# Patient Record
Sex: FEMALE | Race: WHITE | NOT HISPANIC OR LATINO | Employment: UNEMPLOYED | URBAN - METROPOLITAN AREA
[De-identification: names, ages, dates, MRNs, and addresses within clinical notes are randomized per-mention and may not be internally consistent; named-entity substitution may affect disease eponyms.]

---

## 2017-05-02 ENCOUNTER — HISTORICAL (OUTPATIENT)
Dept: ENDOSCOPY | Facility: HOSPITAL | Age: 62
End: 2017-05-02

## 2018-10-05 ENCOUNTER — HISTORICAL (OUTPATIENT)
Dept: INTERNAL MEDICINE | Facility: CLINIC | Age: 63
End: 2018-10-05

## 2018-10-05 LAB
ABS NEUT (OLG): 4.16 X10(3)/MCL (ref 2.1–9.2)
ALBUMIN SERPL-MCNC: 3.9 GM/DL (ref 3.4–5)
ALBUMIN/GLOB SERPL: 1 RATIO (ref 1–2)
ALP SERPL-CCNC: 103 UNIT/L (ref 45–117)
ALT SERPL-CCNC: 29 UNIT/L (ref 12–78)
AST SERPL-CCNC: 22 UNIT/L (ref 15–37)
BASOPHILS # BLD AUTO: 0.03 X10(3)/MCL
BASOPHILS NFR BLD AUTO: 0 %
BILIRUB SERPL-MCNC: 0.5 MG/DL (ref 0.2–1)
BILIRUBIN DIRECT+TOT PNL SERPL-MCNC: 0.1 MG/DL
BILIRUBIN DIRECT+TOT PNL SERPL-MCNC: 0.4 MG/DL
BUN SERPL-MCNC: 11 MG/DL (ref 7–18)
CALCIUM SERPL-MCNC: 9 MG/DL (ref 8.5–10.1)
CHLORIDE SERPL-SCNC: 104 MMOL/L (ref 98–107)
CHOLEST SERPL-MCNC: 208 MG/DL
CHOLEST/HDLC SERPL: 2.6 {RATIO} (ref 0–4.4)
CO2 SERPL-SCNC: 30 MMOL/L (ref 21–32)
CREAT SERPL-MCNC: 0.8 MG/DL (ref 0.6–1.3)
EOSINOPHIL # BLD AUTO: 0.19 X10(3)/MCL
EOSINOPHIL NFR BLD AUTO: 3 %
ERYTHROCYTE [DISTWIDTH] IN BLOOD BY AUTOMATED COUNT: 14.3 % (ref 11.5–14.5)
EST. AVERAGE GLUCOSE BLD GHB EST-MCNC: 108 MG/DL
GLOBULIN SER-MCNC: 4.7 GM/ML (ref 2.3–3.5)
GLUCOSE SERPL-MCNC: 106 MG/DL (ref 74–106)
HBA1C MFR BLD: 5.4 % (ref 4.2–6.3)
HCT VFR BLD AUTO: 43.8 % (ref 35–46)
HDLC SERPL-MCNC: 79 MG/DL
HGB BLD-MCNC: 13.9 GM/DL (ref 12–16)
IMM GRANULOCYTES # BLD AUTO: 0.02 10*3/UL
IMM GRANULOCYTES NFR BLD AUTO: 0 %
LDLC SERPL CALC-MCNC: 88 MG/DL (ref 0–130)
LYMPHOCYTES # BLD AUTO: 2.15 X10(3)/MCL
LYMPHOCYTES NFR BLD AUTO: 31 % (ref 13–40)
MCH RBC QN AUTO: 29.1 PG (ref 26–34)
MCHC RBC AUTO-ENTMCNC: 31.7 GM/DL (ref 31–37)
MCV RBC AUTO: 91.8 FL (ref 80–100)
MONOCYTES # BLD AUTO: 0.38 X10(3)/MCL
MONOCYTES NFR BLD AUTO: 6 % (ref 4–12)
NEUTROPHILS # BLD AUTO: 4.16 X10(3)/MCL
NEUTROPHILS NFR BLD AUTO: 60 X10(3)/MCL
PLATELET # BLD AUTO: 257 X10(3)/MCL (ref 130–400)
PMV BLD AUTO: 10.4 FL (ref 7.4–10.4)
POTASSIUM SERPL-SCNC: 4.1 MMOL/L (ref 3.5–5.1)
PROT SERPL-MCNC: 8.6 GM/DL (ref 6.4–8.2)
RBC # BLD AUTO: 4.77 X10(6)/MCL (ref 4–5.2)
SODIUM SERPL-SCNC: 142 MMOL/L (ref 136–145)
T4 FREE SERPL-MCNC: 0.69 NG/DL (ref 0.76–1.46)
TRIGL SERPL-MCNC: 205 MG/DL
TSH SERPL-ACNC: 3.85 MIU/L (ref 0.36–3.74)
VLDLC SERPL CALC-MCNC: 41 MG/DL
WBC # SPEC AUTO: 6.9 X10(3)/MCL (ref 4.5–11)

## 2018-10-29 ENCOUNTER — HISTORICAL (OUTPATIENT)
Dept: INTERNAL MEDICINE | Facility: CLINIC | Age: 63
End: 2018-10-29

## 2019-06-17 ENCOUNTER — HISTORICAL (OUTPATIENT)
Dept: RADIOLOGY | Facility: HOSPITAL | Age: 64
End: 2019-06-17

## 2019-07-03 ENCOUNTER — HISTORICAL (OUTPATIENT)
Dept: RADIOLOGY | Facility: HOSPITAL | Age: 64
End: 2019-07-03

## 2019-07-03 LAB
CHOLEST SERPL-MCNC: 185 MG/DL
CHOLEST/HDLC SERPL: 2.4 {RATIO} (ref 0–4.4)
HDLC SERPL-MCNC: 76 MG/DL
LDLC SERPL CALC-MCNC: 78 MG/DL (ref 0–130)
TRIGL SERPL-MCNC: 155 MG/DL
VLDLC SERPL CALC-MCNC: 31 MG/DL

## 2019-09-19 ENCOUNTER — HISTORICAL (OUTPATIENT)
Dept: RADIOLOGY | Facility: HOSPITAL | Age: 64
End: 2019-09-19

## 2020-01-13 ENCOUNTER — HISTORICAL (OUTPATIENT)
Dept: ENDOSCOPY | Facility: HOSPITAL | Age: 65
End: 2020-01-13

## 2020-01-13 LAB — CRC RECOMMENDATION EXT: NORMAL

## 2020-02-24 ENCOUNTER — HISTORICAL (OUTPATIENT)
Dept: RESPIRATORY THERAPY | Facility: HOSPITAL | Age: 65
End: 2020-02-24

## 2020-03-11 ENCOUNTER — HISTORICAL (OUTPATIENT)
Dept: INTERNAL MEDICINE | Facility: CLINIC | Age: 65
End: 2020-03-11

## 2020-03-11 LAB
ABS NEUT (OLG): 3.4 X10(3)/MCL (ref 2.1–9.2)
ALBUMIN SERPL-MCNC: 3.7 GM/DL (ref 3.4–5)
ALBUMIN/GLOB SERPL: 0.9 RATIO (ref 1.1–2)
ALP SERPL-CCNC: 104 UNIT/L (ref 45–117)
ALT SERPL-CCNC: 23 UNIT/L (ref 12–78)
AST SERPL-CCNC: 18 UNIT/L (ref 15–37)
BASOPHILS # BLD AUTO: 0 X10(3)/MCL (ref 0–0.2)
BASOPHILS NFR BLD AUTO: 0 %
BILIRUB SERPL-MCNC: 0.4 MG/DL (ref 0.2–1)
BILIRUBIN DIRECT+TOT PNL SERPL-MCNC: 0.1 MG/DL (ref 0–0.2)
BILIRUBIN DIRECT+TOT PNL SERPL-MCNC: 0.3 MG/DL
BUN SERPL-MCNC: 14 MG/DL (ref 7–18)
CALCIUM SERPL-MCNC: 9.1 MG/DL (ref 8.5–10.1)
CHLORIDE SERPL-SCNC: 106 MMOL/L (ref 98–107)
CHOLEST SERPL-MCNC: 190 MG/DL
CHOLEST/HDLC SERPL: 2.3 {RATIO} (ref 0–4.4)
CO2 SERPL-SCNC: 31 MMOL/L (ref 21–32)
CREAT SERPL-MCNC: 0.8 MG/DL (ref 0.6–1.3)
EOSINOPHIL # BLD AUTO: 0.2 X10(3)/MCL (ref 0–0.9)
EOSINOPHIL NFR BLD AUTO: 2 %
ERYTHROCYTE [DISTWIDTH] IN BLOOD BY AUTOMATED COUNT: 14.7 % (ref 11.5–14.5)
EST. AVERAGE GLUCOSE BLD GHB EST-MCNC: 117 MG/DL
GLOBULIN SER-MCNC: 4.3 GM/ML (ref 2.3–3.5)
GLUCOSE SERPL-MCNC: 104 MG/DL (ref 74–106)
HBA1C MFR BLD: 5.7 % (ref 4.2–6.3)
HCT VFR BLD AUTO: 42.5 % (ref 35–46)
HDLC SERPL-MCNC: 81 MG/DL (ref 40–59)
HGB BLD-MCNC: 12.9 GM/DL (ref 12–16)
IMM GRANULOCYTES # BLD AUTO: 0.02 10*3/UL
IMM GRANULOCYTES NFR BLD AUTO: 0 %
LDLC SERPL CALC-MCNC: 84 MG/DL
LYMPHOCYTES # BLD AUTO: 2 X10(3)/MCL (ref 0.6–4.6)
LYMPHOCYTES NFR BLD AUTO: 33 %
MCH RBC QN AUTO: 28.2 PG (ref 26–34)
MCHC RBC AUTO-ENTMCNC: 30.4 GM/DL (ref 31–37)
MCV RBC AUTO: 92.8 FL (ref 80–100)
MONOCYTES # BLD AUTO: 0.4 X10(3)/MCL (ref 0.1–1.3)
MONOCYTES NFR BLD AUTO: 7 %
NEUTROPHILS # BLD AUTO: 3.4 X10(3)/MCL (ref 2.1–9.2)
NEUTROPHILS NFR BLD AUTO: 57 %
PLATELET # BLD AUTO: 235 X10(3)/MCL (ref 130–400)
PMV BLD AUTO: 10.3 FL (ref 7.4–10.4)
POTASSIUM SERPL-SCNC: 4.6 MMOL/L (ref 3.5–5.1)
PROT SERPL-MCNC: 8 GM/DL (ref 6.4–8.2)
RBC # BLD AUTO: 4.58 X10(6)/MCL (ref 4–5.2)
SODIUM SERPL-SCNC: 141 MMOL/L (ref 136–145)
T3FREE SERPL-MCNC: 2.29 PG/ML (ref 2.18–3.98)
T4 FREE SERPL-MCNC: 0.71 NG/DL (ref 0.76–1.46)
TRIGL SERPL-MCNC: 127 MG/DL
TSH SERPL-ACNC: 3.78 MIU/L (ref 0.36–3.74)
VLDLC SERPL CALC-MCNC: 25 MG/DL
WBC # SPEC AUTO: 5.9 X10(3)/MCL (ref 4.5–11)

## 2021-04-30 ENCOUNTER — HISTORICAL (OUTPATIENT)
Dept: RADIOLOGY | Facility: HOSPITAL | Age: 66
End: 2021-04-30

## 2021-04-30 LAB
ABS NEUT (OLG): 3.35 X10(3)/MCL (ref 2.1–9.2)
ALBUMIN SERPL-MCNC: 3.9 GM/DL (ref 3.4–4.8)
ALBUMIN/GLOB SERPL: 1 RATIO (ref 1.1–2)
ALP SERPL-CCNC: 110 UNIT/L (ref 40–150)
ALT SERPL-CCNC: 17 UNIT/L (ref 0–55)
AST SERPL-CCNC: 21 UNIT/L (ref 5–34)
BASOPHILS # BLD AUTO: 0 X10(3)/MCL (ref 0–0.2)
BASOPHILS NFR BLD AUTO: 0 %
BILIRUB SERPL-MCNC: 0.6 MG/DL
BILIRUBIN DIRECT+TOT PNL SERPL-MCNC: 0.3 MG/DL (ref 0–0.5)
BILIRUBIN DIRECT+TOT PNL SERPL-MCNC: 0.3 MG/DL (ref 0–0.8)
BUN SERPL-MCNC: 13.8 MG/DL (ref 9.8–20.1)
CALCIUM SERPL-MCNC: 9.7 MG/DL (ref 8.4–10.2)
CHLORIDE SERPL-SCNC: 105 MMOL/L (ref 98–107)
CHOLEST SERPL-MCNC: 188 MG/DL
CHOLEST/HDLC SERPL: 3 {RATIO} (ref 0–5)
CO2 SERPL-SCNC: 26 MMOL/L (ref 23–31)
CREAT SERPL-MCNC: 0.75 MG/DL (ref 0.55–1.02)
EOSINOPHIL # BLD AUTO: 0.3 X10(3)/MCL (ref 0–0.9)
EOSINOPHIL NFR BLD AUTO: 4 %
ERYTHROCYTE [DISTWIDTH] IN BLOOD BY AUTOMATED COUNT: 14.4 % (ref 11.5–14.5)
EST. AVERAGE GLUCOSE BLD GHB EST-MCNC: 114 MG/DL
GLOBULIN SER-MCNC: 4 GM/DL (ref 2.4–3.5)
GLUCOSE SERPL-MCNC: 111 MG/DL (ref 82–115)
HBA1C MFR BLD: 5.6 %
HCT VFR BLD AUTO: 43.4 % (ref 35–46)
HDLC SERPL-MCNC: 66 MG/DL (ref 35–60)
HGB BLD-MCNC: 13.3 GM/DL (ref 12–16)
IMM GRANULOCYTES # BLD AUTO: 0.02 10*3/UL
IMM GRANULOCYTES NFR BLD AUTO: 0 %
LDLC SERPL CALC-MCNC: 93 MG/DL (ref 50–140)
LYMPHOCYTES # BLD AUTO: 2 X10(3)/MCL (ref 0.6–4.6)
LYMPHOCYTES NFR BLD AUTO: 32 %
MCH RBC QN AUTO: 28.4 PG (ref 26–34)
MCHC RBC AUTO-ENTMCNC: 30.6 GM/DL (ref 31–37)
MCV RBC AUTO: 92.5 FL (ref 80–100)
MONOCYTES # BLD AUTO: 0.4 X10(3)/MCL (ref 0.1–1.3)
MONOCYTES NFR BLD AUTO: 7 %
NEUTROPHILS # BLD AUTO: 3.35 X10(3)/MCL (ref 2.1–9.2)
NEUTROPHILS NFR BLD AUTO: 55 %
PLATELET # BLD AUTO: 183 X10(3)/MCL (ref 130–400)
PMV BLD AUTO: 10.2 FL (ref 7.4–10.4)
POTASSIUM SERPL-SCNC: 4.7 MMOL/L (ref 3.5–5.1)
PROT SERPL-MCNC: 7.9 GM/DL (ref 5.8–7.6)
RBC # BLD AUTO: 4.69 X10(6)/MCL (ref 4–5.2)
SODIUM SERPL-SCNC: 142 MMOL/L (ref 136–145)
T4 FREE SERPL-MCNC: 0.76 NG/DL (ref 0.7–1.48)
TRIGL SERPL-MCNC: 145 MG/DL (ref 37–140)
TSH SERPL-ACNC: 3.01 UIU/ML (ref 0.35–4.94)
VLDLC SERPL CALC-MCNC: 29 MG/DL
WBC # SPEC AUTO: 6 X10(3)/MCL (ref 4.5–11)

## 2021-05-07 ENCOUNTER — HISTORICAL (OUTPATIENT)
Dept: RADIOLOGY | Facility: HOSPITAL | Age: 66
End: 2021-05-07

## 2021-05-21 ENCOUNTER — HISTORICAL (OUTPATIENT)
Dept: ADMINISTRATIVE | Facility: HOSPITAL | Age: 66
End: 2021-05-21

## 2021-11-04 ENCOUNTER — HISTORICAL (OUTPATIENT)
Dept: RADIOLOGY | Facility: HOSPITAL | Age: 66
End: 2021-11-04

## 2021-11-04 LAB
ABS NEUT (OLG): 3.36 X10(3)/MCL (ref 2.1–9.2)
ALBUMIN SERPL-MCNC: 3.8 GM/DL (ref 3.4–4.8)
ALBUMIN/GLOB SERPL: 1 RATIO (ref 1.1–2)
ALP SERPL-CCNC: 92 UNIT/L (ref 40–150)
ALT SERPL-CCNC: 18 UNIT/L (ref 0–55)
AST SERPL-CCNC: 19 UNIT/L (ref 5–34)
BASOPHILS # BLD AUTO: 0 X10(3)/MCL (ref 0–0.2)
BASOPHILS NFR BLD AUTO: 0 %
BILIRUB SERPL-MCNC: 0.5 MG/DL
BILIRUBIN DIRECT+TOT PNL SERPL-MCNC: 0.2 MG/DL (ref 0–0.5)
BILIRUBIN DIRECT+TOT PNL SERPL-MCNC: 0.3 MG/DL (ref 0–0.8)
BUN SERPL-MCNC: 12.9 MG/DL (ref 9.8–20.1)
CALCIUM SERPL-MCNC: 9.6 MG/DL (ref 8.7–10.5)
CHLORIDE SERPL-SCNC: 107 MMOL/L (ref 98–107)
CHOLEST SERPL-MCNC: 177 MG/DL
CHOLEST/HDLC SERPL: 3 {RATIO} (ref 0–5)
CO2 SERPL-SCNC: 28 MMOL/L (ref 23–31)
CREAT SERPL-MCNC: 0.73 MG/DL (ref 0.55–1.02)
EOSINOPHIL # BLD AUTO: 0.1 X10(3)/MCL (ref 0–0.9)
EOSINOPHIL NFR BLD AUTO: 2 %
ERYTHROCYTE [DISTWIDTH] IN BLOOD BY AUTOMATED COUNT: 14.5 % (ref 11.5–14.5)
EST. AVERAGE GLUCOSE BLD GHB EST-MCNC: 102.5 MG/DL
GLOBULIN SER-MCNC: 3.9 GM/DL (ref 2.4–3.5)
GLUCOSE SERPL-MCNC: 111 MG/DL (ref 82–115)
HBA1C MFR BLD: 5.2 %
HCT VFR BLD AUTO: 39.8 % (ref 35–46)
HDLC SERPL-MCNC: 57 MG/DL (ref 35–60)
HGB BLD-MCNC: 12.6 GM/DL (ref 12–16)
IMM GRANULOCYTES # BLD AUTO: 0.02 10*3/UL
IMM GRANULOCYTES NFR BLD AUTO: 0 %
LDLC SERPL CALC-MCNC: 93 MG/DL (ref 50–140)
LYMPHOCYTES # BLD AUTO: 1.7 X10(3)/MCL (ref 0.6–4.6)
LYMPHOCYTES NFR BLD AUTO: 31 %
MCH RBC QN AUTO: 28.6 PG (ref 26–34)
MCHC RBC AUTO-ENTMCNC: 31.7 GM/DL (ref 31–37)
MCV RBC AUTO: 90.5 FL (ref 80–100)
MONOCYTES # BLD AUTO: 0.3 X10(3)/MCL (ref 0.1–1.3)
MONOCYTES NFR BLD AUTO: 6 %
NEUTROPHILS # BLD AUTO: 3.36 X10(3)/MCL (ref 2.1–9.2)
NEUTROPHILS NFR BLD AUTO: 60 %
NRBC BLD AUTO-RTO: 0 % (ref 0–0.2)
PLATELET # BLD AUTO: 237 X10(3)/MCL (ref 130–400)
PMV BLD AUTO: 9.9 FL (ref 7.4–10.4)
POTASSIUM SERPL-SCNC: 4.5 MMOL/L (ref 3.5–5.1)
PROT SERPL-MCNC: 7.7 GM/DL (ref 5.8–7.6)
RBC # BLD AUTO: 4.4 X10(6)/MCL (ref 4–5.2)
SODIUM SERPL-SCNC: 143 MMOL/L (ref 136–145)
T4 FREE SERPL-MCNC: 0.75 NG/DL (ref 0.7–1.48)
TRIGL SERPL-MCNC: 136 MG/DL (ref 37–140)
TSH SERPL-ACNC: 2.49 UIU/ML (ref 0.35–4.94)
VLDLC SERPL CALC-MCNC: 27 MG/DL
WBC # SPEC AUTO: 5.6 X10(3)/MCL (ref 4.5–11)

## 2022-04-07 ENCOUNTER — HISTORICAL (OUTPATIENT)
Dept: ADMINISTRATIVE | Facility: HOSPITAL | Age: 67
End: 2022-04-07

## 2022-04-11 ENCOUNTER — HISTORICAL (OUTPATIENT)
Dept: ADMINISTRATIVE | Facility: HOSPITAL | Age: 67
End: 2022-04-11
Payer: MEDICARE

## 2022-04-29 VITALS
OXYGEN SATURATION: 96 % | SYSTOLIC BLOOD PRESSURE: 115 MMHG | HEIGHT: 61 IN | WEIGHT: 293 LBS | DIASTOLIC BLOOD PRESSURE: 71 MMHG | BODY MASS INDEX: 55.32 KG/M2

## 2022-04-30 NOTE — OP NOTE
DATE OF SURGERY:    05/02/2017    SURGEON:  Bryon Anne MD    attending physician:  Dr. Bryon Anne MD    PROCEDURE:  Colonoscopy to the cecum.    PREOPERATIVE DIAGNOSIS:  Rectal bleeding.    POSTOPERATIVE DIAGNOSES:    1. Rectal bleeding.    2. Diverticulosis.    FINDINGS:  Scattered diverticula throughout the colon with a fair prep on insertion.  Lavaged to a good prep on withdrawal.    SPECIMEN:  Zero.    BLOOD LOSS:  Zero.    DESCRIPTION OF PROCEDURE:  After informed consent was obtained, the patient was sedated by the Anesthesia department with propofol, after which time the Olympus videocolonoscope was inserted into the rectum, advanced up the rectosigmoid colon, around the left colon, and down to the cecum, identifying the base of the cecum and the ileocecal valve.  The prep throughout the colon on insertion was fair with no abnormalities noted.  There were scattered diverticula throughout the colon.  Upon reaching the cecum, the scope was slowly withdrawn back down to the rectum, lavaging the colon to a good prep and finding no additional abnormalities aside from the scattered diverticula.  The scope was then retroflexed in the rectal vault, finding moderate hemorrhoidal congestion.  The scope was then withdrawn and the patient then sent to the holding area.      ______________________________  MD MARJORIE Shah/MODL  DD:  05/02/2017  Time:  08:59AM  DT:  05/02/2017  Time:  09:19AM  Job #:  [id number]

## 2022-05-05 NOTE — HISTORICAL [facility]ER
This is a historical note converted from Cerelena. Formatting and pictures may have been removed.  Please reference Cerelena for original formatting and attached multimedia. History of Present Illness  64F with obesity HLD anxiety presenting for screening colonoscopy. Patient was FIT positive.? She has a hx of hemorrhoids dxed on colonoscopy in 2017. She reports symptomatically having BRBPR when having bowel movements. She has noticed blood in the bowl of the toilet. She denies any dark or tarry stools. She denies any nausea or vomiting. Only other symptom she reports is joint pain for which she takes aleve, and she correlated the beginning of the bleeding temporally with the taking of aleve. Family history is significant for an aunt who had CRC diagnosed in her 80s. Surgical history significant for appendectomy and cholecystectomy  Review of Systems  Negative unless otherwise stated  Physical Exam  Gen: NAD, obese  CV: peripheral pulses intact  Pulm: normal effort no accessory muscle use  Abd: obese, soft nt nd  MSK: BE, no edema  Assessment/Plan  A: 64 F with?hemorrhoids presenting for screening colonoscopy, FIT positive, symptomatic rectal bleeding  ?  P:  Colonoscopy today   Problem List/Past Medical History  Ongoing  Anxiety  anxiety  arthritis  high cholesterol  low back pain with numbness to legs  Morbid obesity  SOB (shortness of breath)  Historical  No qualifying data  Procedure/Surgical History  Colonoscopy (05/02/2017)  Colonoscopy, flexible; diagnostic, including collection of specimen(s) by brushing or washing, when performed (separate procedure) (05/02/2017)  Inspection of Lower Intestinal Tract, Via Natural or Artificial Opening Endoscopic (05/02/2017)  Esophagogastroduodenoscopy, flexible, transoral; diagnostic, including collection of specimen(s) by brushing or washing, when performed (separate procedure) (06/10/2016)  Inspection of Upper Intestinal Tract, Via Natural or Artificial Opening Endoscopic  (06/10/2016)  Exam Under Anesthesia Rectal (None) (03/23/2016)  Excision of Hemorrhoidal Plexus, Open Approach (03/23/2016)  Hemorrhoidectomy (None) (03/23/2016)  Hemorrhoidectomy, internal and external, single column/group; (03/23/2016)  Inspection of Lower Intestinal Tract, Via Natural or Artificial Opening Endoscopic (03/23/2016)  Proctoscopy (None) (03/23/2016)  Proctosigmoidoscopy, rigid; diagnostic, with or without collection of specimen(s) by brushing or washing (separate procedure) (03/23/2016)  Contrast myelogram (12/20/2012)  Injection procedure for myelography and/or computed tomography, spinal (other than C1-C2 and posterior fossa). (12/20/2012)  Arthroplasty, glenohumeral joint; total shoulder (glenoid and proximal humeral replacement (eg, total shoulder)) (2012)  r shoulder replacement  Appendectomy  Cholecystectomy   Medications  Inpatient  Emla topical cream, 1 don, TOP, Once  Lidocaine Viscous 2% mucous membrane solution, 15 mL/EA, N/A, Once  Valium, 10 mg= 2 tab(s), Oral, Once  Home  Crestor 10 mg oral tablet, See Instructions  gabapentin 600 mg oral tablet, 600 mg= 1 tab(s), Oral, TID, 11 refills  Multiple Vitamins oral tablet, 1 tab(s), Oral, Daily  NexIUM 40 mg oral delayed release capsule, 40 mg= 1 cap(s), Oral, Daily, 3 refills  Ventolin HFA 90 mcg/inh inhalation aerosol, 2 puff(s), INH, q6hr, PRN, 11 refills  Zoloft 100 mg oral tablet, 200 mg= 2 tab(s), Oral, Daily, 3 refills  Allergies  NSAIDs?(stomach)  Social History  Abuse/Neglect  No, No, Yes, 09/17/2019  Alcohol - Denies Alcohol Use, 03/06/2013  Past, 09/17/2018  Past, Stopped age 29 Years., 11/16/2015  Past, Started age 16 Years. Stopped age 29 Years. Previous treatment: None. Alcohol use interferes with work or home: No. Drinks more than intended: No. Others hurt by drinking: No. Ready to change: No. 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  Operates hazardous equipment: No., 12/02/2016  HOUSE WIFE, Work/School description: homemaker. Operates hazardous equipment: No., 12/02/2016  homemaker, Work/School description: homemaker. Operates hazardous equipment: No., 12/02/2016  Work/School description: homemaker. Operates hazardous equipment: No. Other: 9th grade., 12/02/2016  Exercise - Does not exercise, 05/13/2015  Self assessment: Poor condition., 05/16/2016  Exercise frequency: 1-2 times/week. Self assessment: Fair condition. Exercise type: Walking., 11/16/2015  Home/Environment  Lives with Spouse. Living situation: Home/Independent. Alcohol abuse in household: No. Substance abuse in household: No. Smoker in household: No. Injuries/Abuse/Neglect in household: No. Feels unsafe at home: No. Safe place to go: Yes. Agency(s)/Others notified: No. Family/Friends available for support: Yes. Concern for family members at home: No. Major illness in household: No. Financial concerns: No. TV/Computer concerns: No., 12/02/2016  Lives with Spouse. Living situation: Home/Independent. Alcohol abuse in household: No. Substance abuse in household: No. Smoker in household: No. Injuries/Abuse/Neglect in household: No. Feels unsafe at home: No. Safe place to go: Yes. Family/Friends available for support: Yes. Concern for family members at home: No. Major illness in household: No. Financial concerns: No. TV/Computer concerns: No., 12/02/2016  Lives with Spouse. Living situation: Home/Independent. Alcohol abuse in household: No. Substance abuse in household: No. Smoker in household: No. Injuries/Abuse/Neglect in household: No. Feels unsafe at home: No. Safe place to go: Yes. Family/Friends available for support: Yes. Concern for family members at home: No. Major illness in household: No. Financial concerns: No. TV/Computer concerns: No., 12/02/2016  Nutrition/Health  Regular, Caffeine intake amount: 2 CUPS COFFEE A DAY. Sleeping concerns: Yes. Feels highly stressed: Yes.,  05/16/2016  Regular, 11/16/2015  Regular, Caffeine intake amount: 2 cups coffee a day,. Wants to lose weight: Yes. Sleeping concerns: No. Feels highly stressed: Yes., 05/13/2015  Sexual - No Risk, 05/13/2015  Gender Identity Identifies as female., 06/11/2019  Gender Identity Identifies as female., 03/19/2019  Sexually active: Yes. Sexually active at age 15 Years. Number of current partners 1. Number of lifetime partners 1. Sexual orientation: Heterosexual. Uses condoms: No. History of sexual abuse: No., 05/13/2015  Spiritual/Cultural  Judaism, 08/27/2019  Substance Use - Denies Substance Abuse, 03/06/2013  Never, 09/17/2018  Never, 11/16/2015  Never, 05/13/2015  Tobacco - Denies Tobacco Use, 12/19/2012  Never (less than 100 in lifetime), N/A, 09/17/2019  Family History  Breast: Other.  Cardiac arrest.: Mother.  Gout: Brother.  Osteoporosis: Sister and Brother.  Primary malignant neoplasm of breast: Other.  Primary malignant neoplasm of colon: Other.  Immunizations  Vaccine Date Status Comments   pneumococcal 13-valent conjugate vaccine 09/17/2019 Given Patient tolerated procedure well.       Here for acolonoscopy?for fit positive test.? Had a colonoscopy in 2017 that showed diverticulosis and hemorrhoids.? Had hemorrhoidectomy in 2016.? Approximately 3 times a year sees BRB in the toilet, small amount, and on the tissue.? Takes stool softener every day and has good BMs daily without any constipation, straining, hard stools.? Aunt had colon cancer in her 80s.

## 2022-05-13 ENCOUNTER — HOSPITAL ENCOUNTER (OUTPATIENT)
Dept: RADIOLOGY | Facility: HOSPITAL | Age: 67
Discharge: HOME | End: 2022-05-13
Attending: NURSE PRACTITIONER
Payer: MEDICARE

## 2022-05-13 ENCOUNTER — HOSPITAL ENCOUNTER (OUTPATIENT)
Dept: CARDIOLOGY | Facility: HOSPITAL | Age: 67
Discharge: HOME | End: 2022-05-13
Attending: NURSE PRACTITIONER
Payer: MEDICARE

## 2022-05-13 VITALS — DIASTOLIC BLOOD PRESSURE: 74 MMHG | SYSTOLIC BLOOD PRESSURE: 113 MMHG | HEART RATE: 64 BPM | RESPIRATION RATE: 20 BRPM

## 2022-05-13 DIAGNOSIS — R06.09 DYSPNEA ON EXERTION: ICD-10-CM

## 2022-05-13 LAB
CV STRESS BASE HR: 64 BPM
DIASTOLIC BLOOD PRESSURE: 74 MMHG
NUC STRESS DIASTOLIC VOLUME INDEX: 83
NUC STRESS EJECTION FRACTION: 64 %
NUC STRESS SYSTOLIC VOLUME INDEX: 30
OHS CV CPX 85 PERCENT MAX PREDICTED HEART RATE MALE: 124
OHS CV CPX ESTIMATED METS: 1
OHS CV CPX MAX PREDICTED HEART RATE: 146
OHS CV CPX PATIENT IS FEMALE: 1
OHS CV CPX PATIENT IS MALE: 0
OHS CV CPX PEAK DIASTOLIC BLOOD PRESSURE: 77 MMHG
OHS CV CPX PEAK HEAR RATE: 101 BPM
OHS CV CPX PEAK RATE PRESSURE PRODUCT: NORMAL
OHS CV CPX PEAK SYSTOLIC BLOOD PRESSURE: 155 MMHG
OHS CV CPX PERCENT MAX PREDICTED HEART RATE ACHIEVED: 69
OHS CV CPX RATE PRESSURE PRODUCT PRESENTING: 7232
STRESS ECHO POST EXERCISE DUR MIN: 9 MINUTES
STRESS ECHO POST EXERCISE DUR SEC: 2 SECONDS
SUMMED DIFFERENCE: 0
SUMMED REST SCORE: 1
SUMMED STRESS SCORE: 1
SYSTOLIC BLOOD PRESSURE: 113 MMHG

## 2022-05-13 PROCEDURE — 63600175 PHARM REV CODE 636 W HCPCS: Performed by: NURSE PRACTITIONER

## 2022-05-13 PROCEDURE — A9502 TC99M TETROFOSMIN: HCPCS

## 2022-05-13 RX ORDER — REGADENOSON 0.08 MG/ML
0.4 INJECTION, SOLUTION INTRAVENOUS
Status: COMPLETED | OUTPATIENT
Start: 2022-05-13 | End: 2022-05-13

## 2022-05-13 RX ADMIN — REGADENOSON 0.4 MG: 0.08 INJECTION, SOLUTION INTRAVENOUS at 08:05

## 2022-05-31 ENCOUNTER — OFFICE VISIT (OUTPATIENT)
Dept: INTERNAL MEDICINE | Facility: CLINIC | Age: 67
End: 2022-05-31
Payer: MEDICARE

## 2022-05-31 VITALS
OXYGEN SATURATION: 96 % | SYSTOLIC BLOOD PRESSURE: 118 MMHG | RESPIRATION RATE: 22 BRPM | HEIGHT: 61 IN | HEART RATE: 70 BPM | TEMPERATURE: 99 F | DIASTOLIC BLOOD PRESSURE: 56 MMHG | WEIGHT: 293 LBS | BODY MASS INDEX: 55.32 KG/M2

## 2022-05-31 DIAGNOSIS — F41.9 ANXIETY: ICD-10-CM

## 2022-05-31 DIAGNOSIS — M81.0 OSTEOPOROSIS, UNSPECIFIED OSTEOPOROSIS TYPE, UNSPECIFIED PATHOLOGICAL FRACTURE PRESENCE: ICD-10-CM

## 2022-05-31 DIAGNOSIS — K21.9 GASTROESOPHAGEAL REFLUX DISEASE, UNSPECIFIED WHETHER ESOPHAGITIS PRESENT: ICD-10-CM

## 2022-05-31 DIAGNOSIS — R06.02 SHORTNESS OF BREATH: Chronic | ICD-10-CM

## 2022-05-31 DIAGNOSIS — E78.49 OTHER HYPERLIPIDEMIA: ICD-10-CM

## 2022-05-31 DIAGNOSIS — E66.01 CLASS 3 SEVERE OBESITY WITHOUT SERIOUS COMORBIDITY WITH BODY MASS INDEX (BMI) OF 60.0 TO 69.9 IN ADULT, UNSPECIFIED OBESITY TYPE: Chronic | ICD-10-CM

## 2022-05-31 PROBLEM — E66.813 CLASS 3 SEVERE OBESITY IN ADULT: Chronic | Status: ACTIVE | Noted: 2022-05-31

## 2022-05-31 PROCEDURE — 99215 OFFICE O/P EST HI 40 MIN: CPT | Mod: PBBFAC | Performed by: NURSE PRACTITIONER

## 2022-05-31 PROCEDURE — 99214 OFFICE O/P EST MOD 30 MIN: CPT | Mod: S$PBB,,, | Performed by: NURSE PRACTITIONER

## 2022-05-31 PROCEDURE — 99214 PR OFFICE/OUTPT VISIT, EST, LEVL IV, 30-39 MIN: ICD-10-PCS | Mod: S$PBB,,, | Performed by: NURSE PRACTITIONER

## 2022-05-31 RX ORDER — FLUTICASONE PROPIONATE 50 MCG
2 SPRAY, SUSPENSION (ML) NASAL
COMMUNITY
Start: 2022-03-19

## 2022-05-31 RX ORDER — GABAPENTIN 600 MG/1
600 TABLET ORAL 3 TIMES DAILY
COMMUNITY
Start: 2022-02-28 | End: 2022-05-31 | Stop reason: SDUPTHER

## 2022-05-31 RX ORDER — ALBUTEROL SULFATE 90 UG/1
2 AEROSOL, METERED RESPIRATORY (INHALATION) EVERY 6 HOURS PRN
COMMUNITY
Start: 2022-02-28 | End: 2022-05-31 | Stop reason: SDUPTHER

## 2022-05-31 RX ORDER — ASPIRIN 81 MG
100 TABLET, DELAYED RELEASE (ENTERIC COATED) ORAL DAILY PRN
COMMUNITY

## 2022-05-31 RX ORDER — ESOMEPRAZOLE MAGNESIUM 40 MG/1
40 CAPSULE, DELAYED RELEASE ORAL DAILY
Qty: 90 CAPSULE | Refills: 2 | Status: SHIPPED | OUTPATIENT
Start: 2022-05-31 | End: 2022-11-30 | Stop reason: SDUPTHER

## 2022-05-31 RX ORDER — NALTREXONE HYDROCHLORIDE AND BUPROPION HYDROCHLORIDE 8; 90 MG/1; MG/1
1 TABLET, EXTENDED RELEASE ORAL 2 TIMES DAILY
COMMUNITY
Start: 2022-02-02 | End: 2022-05-31

## 2022-05-31 RX ORDER — LISDEXAMFETAMINE DIMESYLATE 50 MG/1
50 CAPSULE ORAL DAILY
COMMUNITY
End: 2022-05-31

## 2022-05-31 RX ORDER — LORATADINE 10 MG/1
10 TABLET ORAL DAILY
COMMUNITY
End: 2022-11-30 | Stop reason: SDUPTHER

## 2022-05-31 RX ORDER — ALBUTEROL SULFATE 90 UG/1
2 AEROSOL, METERED RESPIRATORY (INHALATION) EVERY 6 HOURS PRN
Qty: 18 G | Refills: 2 | Status: SHIPPED | OUTPATIENT
Start: 2022-05-31 | End: 2022-11-30 | Stop reason: SDUPTHER

## 2022-05-31 RX ORDER — ALENDRONATE SODIUM 70 MG/1
70 TABLET ORAL WEEKLY
COMMUNITY
Start: 2022-02-28 | End: 2022-05-31 | Stop reason: SDUPTHER

## 2022-05-31 RX ORDER — ALENDRONATE SODIUM 70 MG/1
70 TABLET ORAL WEEKLY
Qty: 12 TABLET | Refills: 2 | Status: SHIPPED | OUTPATIENT
Start: 2022-05-31 | End: 2022-11-30 | Stop reason: SDUPTHER

## 2022-05-31 RX ORDER — SERTRALINE HYDROCHLORIDE 100 MG/1
200 TABLET, FILM COATED ORAL DAILY
Qty: 180 TABLET | Refills: 2 | Status: SHIPPED | OUTPATIENT
Start: 2022-05-31 | End: 2022-11-30 | Stop reason: SDUPTHER

## 2022-05-31 RX ORDER — ROSUVASTATIN CALCIUM 10 MG/1
10 TABLET, COATED ORAL DAILY
Qty: 90 TABLET | Refills: 2 | Status: SHIPPED | OUTPATIENT
Start: 2022-05-31 | End: 2022-11-30 | Stop reason: SDUPTHER

## 2022-05-31 RX ORDER — PRAMIPEXOLE DIHYDROCHLORIDE 0.5 MG/1
0.5 TABLET ORAL NIGHTLY
COMMUNITY
Start: 2022-02-28 | End: 2022-05-31 | Stop reason: SDUPTHER

## 2022-05-31 RX ORDER — AZITHROMYCIN 250 MG/1
TABLET, FILM COATED ORAL
Qty: 6 TABLET | Refills: 0 | Status: SHIPPED | OUTPATIENT
Start: 2022-05-31 | End: 2022-07-13

## 2022-05-31 RX ORDER — ROSUVASTATIN CALCIUM 10 MG/1
10 TABLET, COATED ORAL DAILY
COMMUNITY
Start: 2021-11-30 | End: 2022-05-31 | Stop reason: SDUPTHER

## 2022-05-31 RX ORDER — GABAPENTIN 600 MG/1
600 TABLET ORAL 3 TIMES DAILY
Qty: 90 TABLET | Refills: 6 | Status: SHIPPED | OUTPATIENT
Start: 2022-05-31 | End: 2023-10-17

## 2022-05-31 RX ORDER — BACLOFEN 20 MG/1
20 TABLET ORAL 3 TIMES DAILY PRN
Qty: 60 TABLET | Refills: 1 | Status: SHIPPED | OUTPATIENT
Start: 2022-05-31 | End: 2022-11-30

## 2022-05-31 RX ORDER — BENZOCAINE .13; .15; .5; 2 G/100G; G/100G; G/100G; G/100G
1 GEL ORAL
COMMUNITY

## 2022-05-31 RX ORDER — SERTRALINE HYDROCHLORIDE 100 MG/1
200 TABLET, FILM COATED ORAL DAILY
COMMUNITY
Start: 2022-02-28 | End: 2022-05-31 | Stop reason: SDUPTHER

## 2022-05-31 RX ORDER — PRAMIPEXOLE DIHYDROCHLORIDE 0.5 MG/1
0.5 TABLET ORAL NIGHTLY
Qty: 90 TABLET | Refills: 2 | Status: SHIPPED | OUTPATIENT
Start: 2022-05-31 | End: 2022-11-30 | Stop reason: SDUPTHER

## 2022-05-31 RX ORDER — ESOMEPRAZOLE MAGNESIUM 40 MG/1
40 CAPSULE, DELAYED RELEASE ORAL DAILY
COMMUNITY
Start: 2021-11-30 | End: 2022-05-31 | Stop reason: SDUPTHER

## 2022-05-31 RX ORDER — BACLOFEN 20 MG/1
20 TABLET ORAL 3 TIMES DAILY PRN
COMMUNITY
Start: 2021-11-30 | End: 2022-05-31 | Stop reason: SDUPTHER

## 2022-05-31 NOTE — ASSESSMENT & PLAN NOTE
Continue Nexium 40 mg daily  Avoid spicy foods  Remain in an upright position for at least 30 minutes after consuming meals

## 2022-05-31 NOTE — ASSESSMENT & PLAN NOTE
PFTs 02/24/2020, normal  If NM Stress Test normal, will order CT Thorax due to patient's SOB with exertion.

## 2022-05-31 NOTE — PROGRESS NOTES
Subjective:       Patient ID: Eleanor Buckner is a 67 y.o. female.    Chief Complaint: Annual Exam (Result of stress test. Continue to have SOB . C/o legs being tired after standing.)    Patient has diagnosis of HLD, GERD, Osteoporosis, Anxiety. Patient seen in clinic today for stating SOB with exertion. Patient currently being followed by Cardiology Clinic. NM Stress Test performed on 05/13/2022. Patient to follow up in 07/2022 for results. Patient has PFTs completed in 2020, normal. Patient currently on Ventolin Inhaler, states does help decrease SOB. Patient last seen in clinic on 11/30/2021 by Dr. Conway.    Patient followed by Orthopedic Clinic. Last appointment on 04/27/2022. Presents to sports medicine clinic for follow-up for right ankle pain. Last seen in [facility] on 4/5/22. At that visit, she was noted to have an avulsion fracture of the right talus. She was continued in her CAM boot. Date of injury was 3/28/22. Today she reports improvement of pain and swelling. Able to ambulate without pain out of the CAM boot. Clinical and radiographic evidence of healing. Able to ambulate with minimal difficulty. Good strength noted on exam. Will wean patietn out of the CAM boot. discussed proper HEP and weaning techniques. Discussed importance of fall prevention. Discussed use of lace-up ankle supports as needed. Activity: Activity as tolerated. Therapy: HEP  Medication: OTC NSAIDs or Tylenol prn. RTC: PRN; call if any issues.    Patient seen in ED on 04/07/2022 for dysuria. Patient diagnosed with UTI, prescribed Macrobid and discharged home.     Patient followed by Cardiology Clinic. Last appointment on 03/18/2022. Past medical history of morbid obesity and HLD.  Patient completed an echocardiogram in November 2021 which revealed an ejection fraction of approximately 55%.  She also completed LINDY testing in November 2021 which revealed no evidence of significant arterial insufficiency.  She completed a nuclear stress  test on 9.19.19 that revealed no ischemia and no wall motion abnormality.  She completed PFTs that revealed no abnormalities.  Patient continues to endorse shortness of breath with minimal exertion.  She denies chest pain, palpitations, orthopnea, or syncope.  Patient states she is suffering with sinus issues since last week, but has not presented to urgent care for evaluation.  She states her lower extremity pain has resolved since her previous visit.  She reports compliance with her medications. CANO: EF 55% per Echo Nov. 2021, LVEF -55% (TTE 7.3.19), Lexiscan stress test - negative for ischemia with no wall motion abnormalities (9.19.19), PFTs - normal, Continues to report SOB with minimal exertion, Will order Lexiscan Stress Test to rule out ischemic causes of symptoms - SOB may be patient's angina equivalent, Counseled patient on the importance of low-sodium, heart healthy diet and increased activity as tolerated. Patient to follow up in 4 months.     Mammogram: 04/30/2021, negative  Pap: deferred due to age  Colonoscopy: 01/13/2020, recommend repeat in 10 years  Bone Density: 04/30/2021, osteoporosis    Review of Systems   Constitutional: Negative.  Negative for activity change and unexpected weight change.   HENT: Negative.  Negative for hearing loss, rhinorrhea and trouble swallowing.    Eyes: Negative.  Negative for discharge and visual disturbance.   Respiratory: Negative.  Negative for chest tightness and wheezing.    Cardiovascular: Negative.  Negative for chest pain and palpitations.   Gastrointestinal: Negative.  Negative for blood in stool, constipation, diarrhea and vomiting.   Endocrine: Negative.  Negative for polydipsia and polyuria.   Genitourinary: Negative.  Negative for difficulty urinating, dysuria, hematuria and menstrual problem.   Musculoskeletal: Negative.  Negative for arthralgias, joint swelling and neck pain.   Integumentary:  Negative.   Allergic/Immunologic: Negative.    Neurological:  Negative.  Negative for weakness and headaches.   Hematological: Negative.    Psychiatric/Behavioral: Negative.  Negative for confusion and dysphoric mood.         Objective:      Physical Exam  Vitals reviewed.   Constitutional:       Appearance: Normal appearance. She is obese.   HENT:      Head: Normocephalic and atraumatic.      Mouth/Throat:      Mouth: Mucous membranes are moist.      Pharynx: Oropharynx is clear.   Eyes:      Extraocular Movements: Extraocular movements intact.      Conjunctiva/sclera: Conjunctivae normal.      Pupils: Pupils are equal, round, and reactive to light.   Cardiovascular:      Rate and Rhythm: Normal rate and regular rhythm.      Heart sounds: Normal heart sounds.   Pulmonary:      Effort: Pulmonary effort is normal.      Breath sounds: Normal breath sounds.   Abdominal:      General: Bowel sounds are normal.   Musculoskeletal:         General: Normal range of motion.      Cervical back: Normal range of motion.   Skin:     General: Skin is warm and dry.   Neurological:      Mental Status: She is alert and oriented to person, place, and time.   Psychiatric:         Mood and Affect: Mood normal.         Behavior: Behavior normal.         Assessment:       Problem List Items Addressed This Visit        Psychiatric    Anxiety (Chronic)     Continue Zoloft 200 mg daily  Mental Health Provider referral            Relevant Orders    Ambulatory referral/consult to Behavioral Health       Pulmonary    Shortness of breath (Chronic)     PFTs 2020, normal  If NM Stress Test normal, will order CT Thorax due to patient's SOB with exertion.               Cardiac/Vascular    Other hyperlipidemia (Chronic)     Continue Rosuvastatin 10 mg daily  Weight loss encouraged  Low fat/high fiber diet  Increase physical activity  Chol: 177  HDL: 57  Tri  LDL: 93           Relevant Orders    CBC Auto Differential    Comprehensive Metabolic Panel    Lipid Panel    TSH    Urinalysis, Reflex to  Urine Culture Urine, Clean Catch       Endocrine    Class 3 severe obesity in adult (Chronic)     BMI: 66.83  Weight loss encouraged  Increase physical activity as tolerated              GI    GERD (gastroesophageal reflux disease) (Chronic)     Continue Nexium 40 mg daily  Avoid spicy foods  Remain in an upright position for at least 30 minutes after consuming meals              Orthopedic    Osteoporosis (Chronic)     Calcium level: 9.6  Vitamin D level: ordered  Continue Alendronate 70 mg weekly           Relevant Orders    Vitamin D          Plan:    Patient to follow up in 3 months for SOB.

## 2022-06-25 ENCOUNTER — HOSPITAL ENCOUNTER (EMERGENCY)
Facility: HOSPITAL | Age: 67
Discharge: HOME | End: 2022-06-25
Attending: STUDENT IN AN ORGANIZED HEALTH CARE EDUCATION/TRAINING PROGRAM
Payer: MEDICARE

## 2022-06-25 VITALS
TEMPERATURE: 98 F | DIASTOLIC BLOOD PRESSURE: 78 MMHG | WEIGHT: 293 LBS | HEIGHT: 62 IN | HEART RATE: 62 BPM | OXYGEN SATURATION: 98 % | SYSTOLIC BLOOD PRESSURE: 135 MMHG | BODY MASS INDEX: 53.92 KG/M2 | RESPIRATION RATE: 18 BRPM
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
## 2022-06-25 DIAGNOSIS — S52.501A CLOSED FRACTURE OF DISTAL END OF RIGHT RADIUS, UNSPECIFIED FRACTURE MORPHOLOGY, INITIAL ENCOUNTER: Primary | ICD-10-CM

## 2022-06-25 DIAGNOSIS — S52.611A CLOSED DISPLACED FRACTURE OF STYLOID PROCESS OF RIGHT ULNA, INITIAL ENCOUNTER: ICD-10-CM

## 2022-06-25 LAB
POC CARDIAC TROPONIN I: 0.01 NG/ML
SAMPLE: NORMAL

## 2022-06-25 PROCEDURE — 99284 EMERGENCY DEPT VISIT MOD MDM: CPT | Mod: 25

## 2022-06-25 PROCEDURE — 84484 ASSAY OF TROPONIN QUANT: CPT

## 2022-06-25 PROCEDURE — 25000003 PHARM REV CODE 250: Performed by: STUDENT IN AN ORGANIZED HEALTH CARE EDUCATION/TRAINING PROGRAM

## 2022-06-25 PROCEDURE — 29125 APPL SHORT ARM SPLINT STATIC: CPT

## 2022-06-25 RX ORDER — TRAMADOL HYDROCHLORIDE 50 MG/1
50 TABLET ORAL
Status: COMPLETED | OUTPATIENT
Start: 2022-06-25 | End: 2022-06-25

## 2022-06-25 RX ORDER — TRAMADOL HYDROCHLORIDE 50 MG/1
50 TABLET ORAL EVERY 6 HOURS PRN
Qty: 12 TABLET | Refills: 0 | Status: SHIPPED | OUTPATIENT
Start: 2022-06-25 | End: 2022-06-28

## 2022-06-25 RX ADMIN — TRAMADOL HYDROCHLORIDE 50 MG: 50 TABLET ORAL at 11:06

## 2022-06-25 NOTE — ED PROVIDER NOTES
Encounter Date: 6/25/2022       History     Chief Complaint   Patient presents with    Wrist Injury     Right injury from 2am, tripped on floor and fell.     59-year-old female presents to ED after fall on outstretched hand around 2:00 a.m. this morning.  Patient states she lost her balance and tried to brace her fall with her right arm as she can and the and then bent under her.  Patient endorses pain and swelling to right wrist.  Neurovascular intact on exam patient range of motion decreased secondary to pain.        Review of patient's allergies indicates:   Allergen Reactions    Nsaids (non-steroidal anti-inflammatory drug) Nausea And Vomiting     No past medical history on file.  Past Surgical History:   Procedure Laterality Date    APPENDECTOMY      ARTHROPLASTY OF SHOULDER      CHOLECYSTECTOMY      COLONOSCOPY  01/13/2020    ESOPHAGOGASTRODUODENOSCOPY  06/10/2016    HEMORRHOID SURGERY  03/23/2016    PROCTOSCOPY  03/23/2016     Family History   Problem Relation Age of Onset    Heart failure Mother     Osteoporosis Sister     Osteoporosis Brother     Gout Brother      Social History     Tobacco Use    Smoking status: Never Smoker    Smokeless tobacco: Never Used   Substance Use Topics    Alcohol use: Not Currently    Drug use: Never     Review of Systems   Constitutional: Negative for fever.   HENT: Negative for sore throat.    Respiratory: Negative for shortness of breath.    Cardiovascular: Negative for chest pain.   Gastrointestinal: Negative for nausea.   Genitourinary: Negative for dysuria.   Musculoskeletal:        Neg except as stated in HPI   Skin: Negative for rash.   Neurological: Negative for weakness.   Hematological: Does not bruise/bleed easily.       Physical Exam     Initial Vitals [06/25/22 0947]   BP Pulse Resp Temp SpO2   135/78 62 20 97.9 °F (36.6 °C) 98 %      MAP       --         Physical Exam    Nursing note and vitals reviewed.  Constitutional: She appears well-developed  and well-nourished.   HENT:   Head: Normocephalic and atraumatic.   Eyes: EOM are normal. Pupils are equal, round, and reactive to light.   Neck:   Normal range of motion.  Cardiovascular: Normal rate and regular rhythm.   Pulmonary/Chest: Breath sounds normal. No respiratory distress.   Abdominal: Abdomen is soft. She exhibits no distension.   Musculoskeletal:      Cervical back: Normal range of motion.      Comments: R wrist +edema to hand/wrist ttp to R wrist, decreased ROM 2/2 pain     Neurological: She is alert and oriented to person, place, and time.   Skin: Skin is warm and dry.         ED Course   Orthopedic Injury    Date/Time: 6/25/2022 12:36 PM  Performed by: Nubia Herzog MD  Authorized by: Nubia Herzog MD     Location procedure was performed:  [facility] EMERGENCY DEPARTMENT  Pre-operative diagnosis:  Right wrist fracture  Post-operative diagnosis:  Right wrist fracture  Consent Done?:  Emergent Situation  Injury:     Injury location:  Wrist    Location details:  Right wrist    Injury type:  Fracture    Fracture type: distal radius and ulnar styloid      Fracture type: distal radius and ulnar styloid        Pre-procedure assessment:     Neurovascular status: Neurovascularly intact      Distal perfusion: normal      Neurological function: normal      Range of motion: reduced      Local anesthesia used?: No      Patient sedated?: No        Procedure details:     Description of findings:  Fracture of distal radius and ulnar styloid, associated edema of wrist /hand  Selections made in this section will also lock the Injury type section above.:     Immobilization:  Splint    Splint type:  Sugar tong    Supplies used:  Ortho-Glass    Complications: No      Estimated blood loss (mL):  0    Specimens: No      Implants: No    Post-procedure assessment:     Neurovascular status: Neurovascularly intact      Distal perfusion: normal      Neurological function: normal      Range of motion: splinted      Patient  tolerance:  Patient tolerated the procedure well with no immediate complications      Labs Reviewed   TROPONIN ISTAT          Imaging Results          X-Ray Wrist Complete Right (Final result)  Result time 06/25/22 10:23:56    Final result by Favio Minaya MD (06/25/22 10:23:56)                 Impression:      As above.      Electronically signed by: Favio Minaya  Date:    06/25/2022  Time:    10:23             Narrative:    EXAMINATION:  XR WRIST COMPLETE 3 VIEWS RIGHT    CLINICAL HISTORY:  Unspecified fall, initial encounter    COMPARISON:  None    FINDINGS:  Three views right wrist.  There is mildly comminuted intra-articular fracture of the distal radius.  There is mild dorsal angulation of the distal fracture fragment.  There is accompanying ulnar styloid fracture.                              X-Rays:   Independently Interpreted Readings:   Other Readings:  Distal radial fracture, mild dorsal angulation, also ulnar styloid fracture    Medications   traMADoL tablet 50 mg (50 mg Oral Given 6/25/22 1122)                          Clinical Impression:   Final diagnoses:  [W19.XXXA] Fall  [S52.501A] Closed fracture of distal end of right radius, unspecified fracture morphology, initial encounter (Primary)  [S52.611A] Closed displaced fracture of styloid process of right ulna, initial encounter          ED Disposition Condition    Discharge Stable        ED Prescriptions     Medication Sig Dispense Start Date End Date Auth. Provider    traMADoL (ULTRAM) 50 mg tablet Take 1 tablet (50 mg total) by mouth every 6 (six) hours as needed for Pain. 12 tablet 6/25/2022 6/28/2022 Nubia Herzog MD        Follow-up Information     Follow up With Specialties Details Why Contact Info    FU with ORTHO in 2-3 days   [facility]            Nubia Herzog MD  06/26/22 1259

## 2022-06-28 DIAGNOSIS — S62.109D CLOSED FRACTURE OF WRIST WITH ROUTINE HEALING, UNSPECIFIED LATERALITY, SUBSEQUENT ENCOUNTER: Primary | ICD-10-CM

## 2022-07-05 ENCOUNTER — OFFICE VISIT (OUTPATIENT)
Dept: ORTHOPEDICS | Facility: CLINIC | Age: 67
End: 2022-07-05
Payer: MEDICARE

## 2022-07-05 ENCOUNTER — HOSPITAL ENCOUNTER (OUTPATIENT)
Dept: RADIOLOGY | Facility: HOSPITAL | Age: 67
Discharge: HOME | End: 2022-07-05
Attending: FAMILY MEDICINE
Payer: MEDICARE

## 2022-07-05 VITALS
SYSTOLIC BLOOD PRESSURE: 118 MMHG | DIASTOLIC BLOOD PRESSURE: 75 MMHG | BODY MASS INDEX: 53.92 KG/M2 | HEIGHT: 62 IN | WEIGHT: 293 LBS

## 2022-07-05 DIAGNOSIS — S52.614A CLOSED NONDISPLACED FRACTURE OF STYLOID PROCESS OF RIGHT ULNA, INITIAL ENCOUNTER: ICD-10-CM

## 2022-07-05 DIAGNOSIS — S52.571A OTHER CLOSED INTRA-ARTICULAR FRACTURE OF DISTAL END OF RIGHT RADIUS, INITIAL ENCOUNTER: Primary | ICD-10-CM

## 2022-07-05 DIAGNOSIS — S52.571A OTHER CLOSED INTRA-ARTICULAR FRACTURE OF DISTAL END OF RIGHT RADIUS, INITIAL ENCOUNTER: ICD-10-CM

## 2022-07-05 PROCEDURE — 99214 OFFICE O/P EST MOD 30 MIN: CPT | Mod: PBBFAC,25

## 2022-07-05 PROCEDURE — 25600 CLTX DST RDL FX/EPHYS SEP WO: CPT | Mod: PBBFAC | Performed by: STUDENT IN AN ORGANIZED HEALTH CARE EDUCATION/TRAINING PROGRAM

## 2022-07-05 PROCEDURE — 73110 X-RAY EXAM OF WRIST: CPT | Mod: TC,RT

## 2022-07-05 PROCEDURE — 29125 APPL SHORT ARM SPLINT STATIC: CPT | Mod: PBBFAC,59 | Performed by: STUDENT IN AN ORGANIZED HEALTH CARE EDUCATION/TRAINING PROGRAM

## 2022-07-05 NOTE — PROGRESS NOTES
Subjective:      Patient ID: Eleanor Buckner is a 67 y.o. female.    Chief Complaint: Pain of the Right Wrist    HPI  Right wrist pain.     6/25/22 patient tripped and fell onto the floor on her right wrist. Had immediate pain. She went to the ED, note reviewed. Xrays performed showed a distal radius fracture and ulnar styloid fracture. Neurovascular exam was normal. She was placed in a sugar tong splint and discharged in stable condition.     Today she reports continued right wrist pain. She has been compliant with her splint. States swelling has mildly improved. Still has difficulty with wrist and finger movement due to pain. Denies finger discoloration. Denies numbness/tingling.     Review of Systems   All other systems reviewed and are negative.        Objective:      Vitals:    07/05/22 1024   BP: 118/75           General    Constitutional: She is oriented to person, place, and time. She appears well-developed.   Neurological: She is alert and oriented to person, place, and time.   Psychiatric: She has a normal mood and affect. Her behavior is normal.             Right Hand/Wrist Exam     Pain   Wrist - The patient exhibits pain of the extensory musculature and flexor/pronator group.    Other     Neuorologic Exam    Median Distribution: normal  Ulnar Distribution: normal  Radial Distribution: normal    Comments:  Patient cradling her right wrist in a prone position on her chest. Able to move her digits, though that elicits pain. Pain with any movement of the wrist. Normal appearing skin of the right hand/wrist.       Left Hand/Wrist Exam   Left hand exam is normal.          Muscle Strength   Right Upper Extremity   Right wrist extension strength: unable to perform due to pain.     Vascular Exam       Capillary Refill  Right Hand: normal capillary refill            Xray right wrist complete 6/25/22:  FINDINGS: Three views right wrist.  There is mildly comminuted intra-articular fracture of the distal  radius.  There is mild dorsal angulation of the distal fracture fragment.  There is accompanying ulnar styloid fracture.    Xray right wrist today (7/5/22)  My Impression: complex distal radius fracture extending into the joint. Ulnar styloid fracture noted. Concern for collapse and increase in dorsal angulation of the radius.         Assessment:       Encounter Diagnoses   Name Primary?    Other closed intra-articular fracture of distal end of right radius, initial encounter Yes    Closed nondisplaced fracture of styloid process of right ulna, initial encounter           Plan:       Eleanor was seen today for pain.    Diagnoses and all orders for this visit:    Other closed intra-articular fracture of distal end of right radius, initial encounter  -     X-Ray Wrist Complete Right; Future    Closed nondisplaced fracture of styloid process of right ulna, initial encounter      Dx: Right distal radial and ulnar styloid fracture. Injury date 6/25/22. Low concern for neurovascular compromise. At this time, Xrays show concern for poor healing and potential for collapse of the distal radius. Discussed treatment option plans with patient, including surgical vs nonsurgical treatment. At this time, patient requests to meet with a surgeon to discuss further possible treatment options.   Treatment: Plan for Fracture Management: place in a volar plaster splint today, scheduled an sppointment with orthopedic surgery for furthre discussion of treatment options.   Stabilization: Short Arm Splint  Activity: Activity as Tolerated  Therapy: No Therapy  Medication: First line treatment with daily Acetaminophen 1000mg TID; medication precautions given   RTC: PRN; As needed  Imaging: radiological studies ordered and independently reviewed; discussed with patient; radiologist interpretation pending   Additional Workup: None

## 2022-07-06 NOTE — PROGRESS NOTES
Faculty Attestation: Eleanor Buckner  was seen in Sports Medicine Clinic. Discussed with Dr. Mota at the time of the visit. History of Present Illness, Physical Exam, and Assessment and Plan reviewed. Treatment plan is reasonable and appropriate. Compliance with treatment recommendations is important.  Radiology images independently reviewed and agree with radiologist interpretation.       Neha Silva MD  Family/Sports Medicine

## 2022-07-08 ENCOUNTER — OFFICE VISIT (OUTPATIENT)
Dept: ORTHOPEDICS | Facility: CLINIC | Age: 67
End: 2022-07-08
Payer: MEDICARE

## 2022-07-08 VITALS — WEIGHT: 293 LBS | BODY MASS INDEX: 53.92 KG/M2 | HEIGHT: 62 IN

## 2022-07-08 DIAGNOSIS — S52.571A OTHER CLOSED INTRA-ARTICULAR FRACTURE OF DISTAL END OF RIGHT RADIUS, INITIAL ENCOUNTER: Primary | ICD-10-CM

## 2022-07-08 PROCEDURE — 99214 OFFICE O/P EST MOD 30 MIN: CPT | Mod: S$PBB,,, | Performed by: ORTHOPAEDIC SURGERY

## 2022-07-08 PROCEDURE — 99214 PR OFFICE/OUTPT VISIT, EST, LEVL IV, 30-39 MIN: ICD-10-PCS | Mod: S$PBB,,, | Performed by: ORTHOPAEDIC SURGERY

## 2022-07-08 PROCEDURE — 99213 OFFICE O/P EST LOW 20 MIN: CPT | Mod: PBBFAC

## 2022-07-08 NOTE — PROGRESS NOTES
ATTENDING ADDENDUM    Eleanor Buckner  was evaluated at the time of the encounter with Dr Medina PGY3.  HPI, PE and treatment plan was reviewed. Treatment plan was reasonable and necessary. Imaging was reviewed at the time of visit.     The risks, benefits, outcomes, and alternatives of conservative vs operative management were  discussed with the patient in clinic today. Informed consent was obtained for ORIF right distal radius.

## 2022-07-08 NOTE — PROGRESS NOTES
[facility] Orthopaedic Surgery Clinic Note    In brief, Eleanor Buckner is a 67 y.o. right-hand-dominant female who presents to clinic for evaluation of a right distal radius fracture.  Patient reports 2 weeks ago she slipped and fell and landed onto an outstretched right upper extremity.  She had immediate pain and deformity to the right wrist and presented to the emergency department where she was diagnosed with right distal radius fracture.  She was seen in the Sports Medicine Clinic 3 days ago and was placed into a splint and referred to our clinic for operative discussion.  Of note, patient states she had a right shoulder replacement 10 years ago for a fracture in the right upper extremity.  She has not had any issues with that since.  She denies previous distal radius fracture.  Denies numbness tingling right upper extremity.  Denies other injuries at this time.  States she does not have any hobbies.      PMH:   Past Medical History:   Diagnosis Date    Hyperlipemia        PSH:   Past Surgical History:   Procedure Laterality Date    APPENDECTOMY      ARTHROPLASTY OF SHOULDER      CHOLECYSTECTOMY      COLONOSCOPY  01/13/2020    ESOPHAGOGASTRODUODENOSCOPY  06/10/2016    HEMORRHOID SURGERY  03/23/2016    PROCTOSCOPY  03/23/2016       SH:   Social History     Socioeconomic History    Marital status:    Tobacco Use    Smoking status: Never Smoker    Smokeless tobacco: Never Used   Substance and Sexual Activity    Alcohol use: Not Currently    Drug use: Never    Sexual activity: Not Currently       FH:   Family History   Problem Relation Age of Onset    Heart failure Mother     Osteoporosis Sister     Osteoporosis Brother     Gout Brother        Allergies:   Review of patient's allergies indicates:   Allergen Reactions    Nsaids (non-steroidal anti-inflammatory drug) Nausea And Vomiting       ROS:  Constitutional- no fever, fatigue, weakness  Eye- no vision loss, eye redness, drainage, or  pain  ENMT- no sore throat, ear pain, sinus pain/congestion, nasal congestion/drainage  Respiratory- no cough, wheezing, or shortness of breath  CV- no chest pain, no palpitations, no edema  GI- no N/V/D; no abdominal pain    Physical Exam:  There were no vitals filed for this visit.    General: NAD  Cardio: RRR by peripheral pulse  Pulm: Normal WOB on room air, symmetric chest rise  Abd: Soft, NT/ND    MSK:  Right upper extremity  Volar slab splint intact  Motor intact to AIN/PIN/ulnar  Sensation intact to light touch in median/ulnar/radial  Fingers are warm well perfused    Imaging:   Review of right wrist x-rays significant for comminuted, intra-articular, dorsally angulated distal radius fracture and ulnar styloid fracture.  Her carpals on dorsal to the radius on lateral view.    Assessment:  67-year-old right-hand-dominant female with a comminuted, intra-articular right distal radius fracture.    -patient was booked and consented in clinic today for open reduction internal fixation right distal radius with Dr. Morel on 7/14/22.  -maintain splint to the right upper extremity.  -nonweightbearing right upper extremity.    Shmuel Medina MD  [facility] Orthopaedic Surgery  7/8/2022 8:34 AM

## 2022-07-13 ENCOUNTER — PATIENT MESSAGE (OUTPATIENT)
Dept: ADMINISTRATIVE | Facility: OTHER | Age: 67
End: 2022-07-13
Payer: MEDICARE

## 2022-07-13 ENCOUNTER — PATIENT MESSAGE (OUTPATIENT)
Dept: ANESTHESIOLOGY | Facility: HOSPITAL | Age: 67
End: 2022-07-13
Payer: MEDICARE

## 2022-07-13 ENCOUNTER — HOSPITAL ENCOUNTER (OUTPATIENT)
Dept: CARDIOLOGY | Facility: HOSPITAL | Age: 67
Discharge: HOME | End: 2022-07-13
Attending: NURSE PRACTITIONER
Payer: MEDICARE

## 2022-07-13 ENCOUNTER — ANESTHESIA EVENT (OUTPATIENT)
Dept: SURGERY | Facility: HOSPITAL | Age: 67
End: 2022-07-13
Payer: MEDICARE

## 2022-07-13 DIAGNOSIS — Z01.818 OTHER SPECIFIED PRE-OPERATIVE EXAMINATION: Primary | ICD-10-CM

## 2022-07-13 DIAGNOSIS — Z01.818 OTHER SPECIFIED PRE-OPERATIVE EXAMINATION: ICD-10-CM

## 2022-07-13 PROCEDURE — 93041 RHYTHM ECG TRACING: CPT

## 2022-07-13 PROCEDURE — 93005 ELECTROCARDIOGRAM TRACING: CPT

## 2022-07-13 PROCEDURE — 93010 ELECTROCARDIOGRAM REPORT: CPT | Mod: ,,, | Performed by: INTERNAL MEDICINE

## 2022-07-13 PROCEDURE — 93010 EKG 12-LEAD: ICD-10-PCS | Mod: ,,, | Performed by: INTERNAL MEDICINE

## 2022-07-13 RX ORDER — MUPIROCIN 20 MG/G
OINTMENT TOPICAL
Status: CANCELLED | OUTPATIENT
Start: 2022-07-13

## 2022-07-13 NOTE — ANESTHESIA PREPROCEDURE EVALUATION
07/13/2022  Eleanor Buckner is a 67 y.o., female with PMHx of super morbid obesity, HLD, GERD, CKD, fibromylagia, anxiety presents for ORIF Rt distal radius.     COVID STATUS: VACCINE X 2 (MODERNA, LASTLY 4/26/21)  BETA-BLOCKER: NONE    PAT NURSE PHONE INTERVIEW 7/13/22    PROBLEM LIST:  -  RIGHT DISTAL RADIUS FRACTURE 2/2 FALL 6/25/22  -  SUPER MORBID OBESITY  -  HLD  -  GERD, SMALL HIATAL HERNIA  -  RLS  -  FIBROMYALGIA, CHRONIC PAIN SYNDROME  -  ANXIETY  -  CKD - 11/4/21 GFR=85  -  LUMBAR DISC DISEASE, OA      - S/P 2012 RIGHT SHOULDER ARTHROPLASTY  -  SOB 2/2 BODY HABITUS - QUICK RELIEF USE 1-2x/MONTH  -  7/13/22 EKG=ACCELERATED JUNCTIONAL RHYTHM, LOW VOLTAGE QRS (DENIES CP, METs LIMITED 2/2 BODY HABITUS)    AM Rx DOS: FLONASE, NEXIUM, GABAPENTIN, VENTOLIN HFA PRN    ORDERS -   SURGEON: 3/18/16 EKG; 5/21/21 CXR; 11/4/21 A1c; 7/13/22 CBC, CMP, TSH; NO NEW ORDERS  ANESTHESIA: ADD:  EKG (TO BE DONE @ [facility])  CARDs ([facility]) 4/30/21 OV (SEE BELOW)    Pre-op Assessment    I have reviewed the NPO Status.      Review of Systems  Anesthesia Hx:  No problems with previous Anesthesia    Social:  Non-Smoker    Cardiovascular:   hyperlipidemia    Pulmonary:  Pulmonary Normal    Renal/:   Chronic Renal Disease, CRI    Hepatic/GI:   GERD, well controlled    Neurological:   Chronic Pain Syndrome   Endocrine:  Morbid Obesity / BMI > 40  Psych:   anxiety        Vitals:    07/14/22 0921   BP: 110/74   Pulse: 64   Resp: 20   Temp: 36.2 °C (97.2 °F)         Physical Exam  General: Alert, Cooperative and Well nourished    Airway:  Mallampati: III   Mouth Opening: Normal  TM Distance: Normal  Tongue: Normal  Neck ROM: Normal ROM    Dental:  Dentures    Chest/Lungs:  Clear to auscultation, Normal Respiratory Rate    Heart:  Rate: Normal  Rhythm: Regular Rhythm  Sounds: Normal      Lab Results   Component Value  Date    WBC 5.5 07/13/2022    HGB 11.7 (L) 07/13/2022    HCT 39.3 07/13/2022    MCV 92.0 07/13/2022     07/13/2022       CMP  Sodium Level   Date Value Ref Range Status   07/13/2022 142 136 - 145 mmol/L Final     Potassium Level   Date Value Ref Range Status   07/13/2022 5.1 3.5 - 5.1 mmol/L Final     Carbon Dioxide   Date Value Ref Range Status   07/13/2022 29 23 - 31 mmol/L Final     Blood Urea Nitrogen   Date Value Ref Range Status   07/13/2022 17.4 9.8 - 20.1 mg/dL Final     Creatinine   Date Value Ref Range Status   07/13/2022 0.87 0.55 - 1.02 mg/dL Final     Calcium Level Total   Date Value Ref Range Status   07/13/2022 9.4 8.4 - 10.2 mg/dL Final     Albumin Level   Date Value Ref Range Status   07/13/2022 3.5 3.4 - 4.8 gm/dL Final     Bilirubin Total   Date Value Ref Range Status   07/13/2022 0.4 <=1.5 mg/dL Final     Alkaline Phosphatase   Date Value Ref Range Status   07/13/2022 97 40 - 150 unit/L Final     Aspartate Aminotransferase   Date Value Ref Range Status   07/13/2022 15 5 - 34 unit/L Final     Alanine Aminotransferase   Date Value Ref Range Status   07/13/2022 13 0 - 55 unit/L Final     Estimated GFR-Non    Date Value Ref Range Status   07/13/2022 >60 mls/min/1.73/m2 Final         5/13/22 CHERYLE  Conclusion         Abnormal myocardial perfusion scan.    There is a mild intensity, small sized, fixed perfusion abnormality consistent with scar in the apical wall(s) in the typical distribution of the LAD territory.    There are no other significant perfusion abnormalities.    The gated perfusion images showed an ejection fraction of 64% post stress.    The patient reported no chest pain during the stress test.      11/4/21 ECHO      4/30/21 CARDs OV          Anesthesia Plan  Type of Anesthesia, risks & benefits discussed:    Anesthesia Type: Regional  Intra-op Monitoring Plan: Standard ASA Monitors  Post Op Pain Control Plan: IV/PO Opioids PRN  Induction:  IV  Airway Plan:  Video  Informed Consent: Informed consent signed with the Patient and all parties understand the risks and agree with anesthesia plan.  All questions answered.   ASA Score: 3  Day of Surgery Review of History & Physical: H&P Update referred to the surgeon/provider.  Anesthesia Plan Notes: Infraclavicular block performed under ultrasound guidance.  Due to body habitus, unable to adequately see lower portion axillary artery with spread around entire lower nerve bundle.  If block inadequate, will proceed with GETA.    Ready For Surgery From Anesthesia Perspective.     .

## 2022-07-14 ENCOUNTER — HOSPITAL ENCOUNTER (OUTPATIENT)
Facility: HOSPITAL | Age: 67
Discharge: HOME | End: 2022-07-14
Attending: ORTHOPAEDIC SURGERY | Admitting: ORTHOPAEDIC SURGERY
Payer: MEDICARE

## 2022-07-14 ENCOUNTER — TELEPHONE (OUTPATIENT)
Dept: CARDIOLOGY | Facility: CLINIC | Age: 67
End: 2022-07-14
Payer: MEDICARE

## 2022-07-14 ENCOUNTER — ANESTHESIA (OUTPATIENT)
Dept: SURGERY | Facility: HOSPITAL | Age: 67
End: 2022-07-14
Payer: MEDICARE

## 2022-07-14 DIAGNOSIS — S52.571A OTHER CLOSED INTRA-ARTICULAR FRACTURE OF DISTAL END OF RIGHT RADIUS, INITIAL ENCOUNTER: Primary | ICD-10-CM

## 2022-07-14 PROBLEM — S52.501A CLOSED FRACTURE OF RIGHT DISTAL RADIUS: Status: ACTIVE | Noted: 2022-07-14

## 2022-07-14 PROCEDURE — 63600175 PHARM REV CODE 636 W HCPCS: Performed by: ANESTHESIOLOGY

## 2022-07-14 PROCEDURE — 94761 N-INVAS EAR/PLS OXIMETRY MLT: CPT

## 2022-07-14 PROCEDURE — 63600175 PHARM REV CODE 636 W HCPCS: Performed by: NURSE ANESTHETIST, CERTIFIED REGISTERED

## 2022-07-14 PROCEDURE — 71000015 HC POSTOP RECOV 1ST HR: Performed by: ORTHOPAEDIC SURGERY

## 2022-07-14 PROCEDURE — 25000003 PHARM REV CODE 250: Performed by: NURSE ANESTHETIST, CERTIFIED REGISTERED

## 2022-07-14 PROCEDURE — C1713 ANCHOR/SCREW BN/BN,TIS/BN: HCPCS | Performed by: ORTHOPAEDIC SURGERY

## 2022-07-14 PROCEDURE — 63600175 PHARM REV CODE 636 W HCPCS

## 2022-07-14 PROCEDURE — 36000711: Performed by: ORTHOPAEDIC SURGERY

## 2022-07-14 PROCEDURE — 71000033 HC RECOVERY, INTIAL HOUR: Performed by: ORTHOPAEDIC SURGERY

## 2022-07-14 PROCEDURE — 37000008 HC ANESTHESIA 1ST 15 MINUTES: Performed by: ORTHOPAEDIC SURGERY

## 2022-07-14 PROCEDURE — 37000009 HC ANESTHESIA EA ADD 15 MINS: Performed by: ORTHOPAEDIC SURGERY

## 2022-07-14 PROCEDURE — 71000016 HC POSTOP RECOV ADDL HR: Performed by: ORTHOPAEDIC SURGERY

## 2022-07-14 PROCEDURE — 64415 NJX AA&/STRD BRCH PLXS IMG: CPT | Performed by: ANESTHESIOLOGY

## 2022-07-14 PROCEDURE — 25608 PR OPEN RX DISTAL RADIUS FX, INTRA-ARTICULAR, 2 FRAG: ICD-10-PCS | Mod: RT,GC,, | Performed by: ORTHOPAEDIC SURGERY

## 2022-07-14 PROCEDURE — 25608 OPTX DST RD XART FX/EPI SEP2: CPT | Mod: RT,GC,, | Performed by: ORTHOPAEDIC SURGERY

## 2022-07-14 PROCEDURE — 36000710: Performed by: ORTHOPAEDIC SURGERY

## 2022-07-14 PROCEDURE — 27201423 OPTIME MED/SURG SUP & DEVICES STERILE SUPPLY: Performed by: ORTHOPAEDIC SURGERY

## 2022-07-14 DEVICE — PEG GEMINUS SMTH LOK 2X18MM: Type: IMPLANTABLE DEVICE | Site: RADIUS | Status: FUNCTIONAL

## 2022-07-14 DEVICE — IMPLANTABLE DEVICE: Type: IMPLANTABLE DEVICE | Site: RADIUS | Status: FUNCTIONAL

## 2022-07-14 DEVICE — PEG GEMINUS SMTH LOK 2X20MM: Type: IMPLANTABLE DEVICE | Site: RADIUS | Status: FUNCTIONAL

## 2022-07-14 DEVICE — SCREW GEMINUS NLOK 3.5X10MM: Type: IMPLANTABLE DEVICE | Site: RADIUS | Status: FUNCTIONAL

## 2022-07-14 RX ORDER — OXYCODONE HYDROCHLORIDE 5 MG/1
5 TABLET ORAL
Status: DISCONTINUED | OUTPATIENT
Start: 2022-07-14 | End: 2022-07-14 | Stop reason: HOSPADM

## 2022-07-14 RX ORDER — CEFAZOLIN SODIUM 1 G/3ML
INJECTION, POWDER, FOR SOLUTION INTRAMUSCULAR; INTRAVENOUS
Status: DISCONTINUED | OUTPATIENT
Start: 2022-07-14 | End: 2022-07-14

## 2022-07-14 RX ORDER — MEPERIDINE HYDROCHLORIDE 25 MG/ML
12.5 INJECTION INTRAMUSCULAR; INTRAVENOUS; SUBCUTANEOUS EVERY 10 MIN PRN
Status: DISCONTINUED | OUTPATIENT
Start: 2022-07-14 | End: 2022-07-14

## 2022-07-14 RX ORDER — ROCURONIUM BROMIDE 10 MG/ML
INJECTION, SOLUTION INTRAVENOUS
Status: DISCONTINUED | OUTPATIENT
Start: 2022-07-14 | End: 2022-07-14

## 2022-07-14 RX ORDER — SUCCINYLCHOLINE CHLORIDE 20 MG/ML
INJECTION INTRAMUSCULAR; INTRAVENOUS
Status: DISCONTINUED | OUTPATIENT
Start: 2022-07-14 | End: 2022-07-14

## 2022-07-14 RX ORDER — PROPOFOL 10 MG/ML
VIAL (ML) INTRAVENOUS
Status: DISCONTINUED | OUTPATIENT
Start: 2022-07-14 | End: 2022-07-14

## 2022-07-14 RX ORDER — LIDOCAINE HYDROCHLORIDE 10 MG/ML
INJECTION, SOLUTION EPIDURAL; INFILTRATION; INTRACAUDAL; PERINEURAL
Status: DISCONTINUED
Start: 2022-07-14 | End: 2022-07-14 | Stop reason: HOSPADM

## 2022-07-14 RX ORDER — CEFAZOLIN SODIUM 2 G/50ML
2 SOLUTION INTRAVENOUS
Status: DISCONTINUED | OUTPATIENT
Start: 2022-07-14 | End: 2022-07-14 | Stop reason: HOSPADM

## 2022-07-14 RX ORDER — ONDANSETRON 2 MG/ML
4 INJECTION INTRAMUSCULAR; INTRAVENOUS DAILY PRN
Status: DISCONTINUED | OUTPATIENT
Start: 2022-07-14 | End: 2022-07-14

## 2022-07-14 RX ORDER — ACETAMINOPHEN 10 MG/ML
INJECTION, SOLUTION INTRAVENOUS
Status: COMPLETED
Start: 2022-07-14 | End: 2022-07-14

## 2022-07-14 RX ORDER — ROPIVACAINE HYDROCHLORIDE 5 MG/ML
30 INJECTION, SOLUTION EPIDURAL; INFILTRATION; PERINEURAL ONCE
Status: DISCONTINUED | OUTPATIENT
Start: 2022-07-14 | End: 2022-07-14 | Stop reason: HOSPADM

## 2022-07-14 RX ORDER — MORPHINE SULFATE 2 MG/ML
2 INJECTION, SOLUTION INTRAMUSCULAR; INTRAVENOUS EVERY 5 MIN PRN
Status: DISCONTINUED | OUTPATIENT
Start: 2022-07-14 | End: 2022-07-14

## 2022-07-14 RX ORDER — ONDANSETRON 4 MG/1
4 TABLET, FILM COATED ORAL EVERY 8 HOURS PRN
Qty: 21 TABLET | Refills: 0 | Status: SHIPPED | OUTPATIENT
Start: 2022-07-14 | End: 2022-07-21

## 2022-07-14 RX ORDER — HYDROCODONE BITARTRATE AND ACETAMINOPHEN 5; 325 MG/1; MG/1
1 TABLET ORAL EVERY 6 HOURS PRN
Qty: 28 TABLET | Refills: 0 | Status: SHIPPED | OUTPATIENT
Start: 2022-07-14 | End: 2022-07-21

## 2022-07-14 RX ORDER — MIDAZOLAM HYDROCHLORIDE 5 MG/ML
5 INJECTION INTRAMUSCULAR; INTRAVENOUS ONCE
Status: COMPLETED | OUTPATIENT
Start: 2022-07-14 | End: 2022-07-14

## 2022-07-14 RX ORDER — LIDOCAINE HYDROCHLORIDE 20 MG/ML
INJECTION INTRAVENOUS
Status: DISCONTINUED | OUTPATIENT
Start: 2022-07-14 | End: 2022-07-14

## 2022-07-14 RX ORDER — DEXAMETHASONE SODIUM PHOSPHATE 4 MG/ML
INJECTION, SOLUTION INTRA-ARTICULAR; INTRALESIONAL; INTRAMUSCULAR; INTRAVENOUS; SOFT TISSUE
Status: DISCONTINUED | OUTPATIENT
Start: 2022-07-14 | End: 2022-07-14

## 2022-07-14 RX ORDER — LIDOCAINE HYDROCHLORIDE 10 MG/ML
1 INJECTION, SOLUTION EPIDURAL; INFILTRATION; INTRACAUDAL; PERINEURAL ONCE
Status: DISCONTINUED | OUTPATIENT
Start: 2022-07-14 | End: 2022-07-14 | Stop reason: HOSPADM

## 2022-07-14 RX ORDER — BUPIVACAINE HYDROCHLORIDE 2.5 MG/ML
INJECTION, SOLUTION EPIDURAL; INFILTRATION; INTRACAUDAL
Status: DISCONTINUED
Start: 2022-07-14 | End: 2022-07-14 | Stop reason: HOSPADM

## 2022-07-14 RX ORDER — SODIUM CHLORIDE, SODIUM LACTATE, POTASSIUM CHLORIDE, CALCIUM CHLORIDE 600; 310; 30; 20 MG/100ML; MG/100ML; MG/100ML; MG/100ML
INJECTION, SOLUTION INTRAVENOUS CONTINUOUS
Status: DISCONTINUED | OUTPATIENT
Start: 2022-07-14 | End: 2022-07-14 | Stop reason: HOSPADM

## 2022-07-14 RX ORDER — ONDANSETRON 2 MG/ML
INJECTION INTRAMUSCULAR; INTRAVENOUS
Status: DISCONTINUED | OUTPATIENT
Start: 2022-07-14 | End: 2022-07-14

## 2022-07-14 RX ORDER — ROPIVACAINE HYDROCHLORIDE 5 MG/ML
INJECTION, SOLUTION EPIDURAL; INFILTRATION; PERINEURAL
Status: COMPLETED | OUTPATIENT
Start: 2022-07-14 | End: 2022-07-14

## 2022-07-14 RX ORDER — ACETAMINOPHEN 10 MG/ML
1000 INJECTION, SOLUTION INTRAVENOUS ONCE
Status: DISCONTINUED | OUTPATIENT
Start: 2022-07-14 | End: 2022-07-14

## 2022-07-14 RX ORDER — FENTANYL CITRATE 50 UG/ML
INJECTION, SOLUTION INTRAMUSCULAR; INTRAVENOUS
Status: DISCONTINUED | OUTPATIENT
Start: 2022-07-14 | End: 2022-07-14

## 2022-07-14 RX ADMIN — ACETAMINOPHEN 1000 MG: 10 INJECTION, SOLUTION INTRAVENOUS at 02:07

## 2022-07-14 RX ADMIN — ONDANSETRON 4 MG: 2 INJECTION INTRAMUSCULAR; INTRAVENOUS at 01:07

## 2022-07-14 RX ADMIN — ROCURONIUM BROMIDE 10 MG: 10 SOLUTION INTRAVENOUS at 12:07

## 2022-07-14 RX ADMIN — SUCCINYLCHOLINE CHLORIDE 180 MG: 20 INJECTION, SOLUTION INTRAMUSCULAR; INTRAVENOUS at 12:07

## 2022-07-14 RX ADMIN — PROPOFOL 200 MG: 10 INJECTION, EMULSION INTRAVENOUS at 12:07

## 2022-07-14 RX ADMIN — LIDOCAINE HYDROCHLORIDE 50 MG: 20 INJECTION, SOLUTION INTRAVENOUS at 12:07

## 2022-07-14 RX ADMIN — ROPIVACAINE HYDROCHLORIDE 30 ML: 5 INJECTION, SOLUTION EPIDURAL; INFILTRATION; PERINEURAL at 09:07

## 2022-07-14 RX ADMIN — CEFAZOLIN 3 G: 330 INJECTION, POWDER, FOR SOLUTION INTRAMUSCULAR; INTRAVENOUS at 12:07

## 2022-07-14 RX ADMIN — SODIUM CHLORIDE, POTASSIUM CHLORIDE, SODIUM LACTATE AND CALCIUM CHLORIDE: 600; 310; 30; 20 INJECTION, SOLUTION INTRAVENOUS at 09:07

## 2022-07-14 RX ADMIN — FENTANYL CITRATE 50 MCG: 50 INJECTION, SOLUTION INTRAMUSCULAR; INTRAVENOUS at 12:07

## 2022-07-14 RX ADMIN — MORPHINE SULFATE 2 MG: 2 INJECTION, SOLUTION INTRAMUSCULAR; INTRAVENOUS at 02:07

## 2022-07-14 RX ADMIN — ROCURONIUM BROMIDE 40 MG: 10 SOLUTION INTRAVENOUS at 12:07

## 2022-07-14 RX ADMIN — SODIUM CHLORIDE, POTASSIUM CHLORIDE, SODIUM LACTATE AND CALCIUM CHLORIDE: 600; 310; 30; 20 INJECTION, SOLUTION INTRAVENOUS at 11:07

## 2022-07-14 RX ADMIN — DEXAMETHASONE SODIUM PHOSPHATE 8 MG: 4 INJECTION, SOLUTION INTRA-ARTICULAR; INTRALESIONAL; INTRAMUSCULAR; INTRAVENOUS; SOFT TISSUE at 12:07

## 2022-07-14 RX ADMIN — MIDAZOLAM HYDROCHLORIDE 5 MG: 5 INJECTION, SOLUTION INTRAMUSCULAR; INTRAVENOUS at 09:07

## 2022-07-14 RX ADMIN — MEPERIDINE HYDROCHLORIDE 12.5 MG: 25 INJECTION INTRAMUSCULAR; INTRAVENOUS; SUBCUTANEOUS at 02:07

## 2022-07-14 NOTE — ANESTHESIA PROCEDURE NOTES
Peripheral Block    Patient location during procedure: pre-op   Block not for primary anesthetic.  Reason for block: at surgeon's request and post-op pain management   Post-op Pain Location: Rt wrist pain   Start time: 7/14/2022 9:40 AM  Timeout: 7/14/2022 9:40 AM   End time: 7/14/2022 9:47 AM    Staffing  Authorizing Provider: Ginny Carnes MD  Performing Provider: Ginny Carnes MD    Preanesthetic Checklist  Completed: patient identified, IV checked, site marked, risks and benefits discussed, surgical consent, monitors and equipment checked, pre-op evaluation and timeout performed  Peripheral Block  Patient position: sitting  Prep: ChloraPrep  Patient monitoring: heart rate, cardiac monitor, continuous pulse ox, continuous capnometry and frequent blood pressure checks  Block type: infraclavicular  Laterality: right  Injection technique: single shot  Needle  Needle type: Stimuplex   Needle gauge: 21 G  Needle length: 4 in  Needle localization: ultrasound guidance and anatomical landmarks   -ultrasound image captured on disc.  Assessment  Injection assessment: negative aspiration and negative parasthesia  Paresthesia pain: none  Heart rate change: no  Slow fractionated injection: yes    Medications:    Medications: ropivacaine (NAROPIN) injection 0.5% - Perineural   30 mL - 7/14/2022 9:45:00 AM    Additional Notes  VSS.  [facility] RN monitoring vitals throughout procedure.  Patient tolerated procedure well.

## 2022-07-14 NOTE — TRANSFER OF CARE
Anesthesia Transfer of Care Note    Patient: Eleanor Buckner    Procedure(s) Performed: Procedure(s) (LRB):  ORIF, FRACTURE, RADIUS, DISTAL (Right)    Patient location: GI    Anesthesia Type: general    Transport from OR: Transported from OR on room air with adequate spontaneous ventilation    Post pain: adequate analgesia    Post assessment: no apparent anesthetic complications and tolerated procedure well    Post vital signs: stable    Level of consciousness: awake and alert    Nausea/Vomiting: no nausea/vomiting    Complications: none    Transfer of care protocol was followed      Last vitals:   Visit Vitals  /74 (BP Location: Right arm)   Pulse 64   Temp 36.2 °C (97.2 °F) (Temporal)   Resp 20   Wt (!) 160.3 kg (353 lb 6.4 oz)   SpO2 100%   Breastfeeding No   BMI 64.64 kg/m²

## 2022-07-14 NOTE — ANESTHESIA POSTPROCEDURE EVALUATION
Anesthesia Post Evaluation    Patient: Eleanor Buckner    Procedure(s) Performed: Procedure(s) (LRB):  ORIF, FRACTURE, RADIUS, DISTAL (Right)    Final Anesthesia Type: general      Patient location during evaluation: PACU  Level of consciousness: sedated  Post-procedure vital signs: reviewed and stable  Airway patency: patent      Anesthetic complications: no      Cardiovascular status: hemodynamically stable  Respiratory status: spontaneous ventilation  Follow-up not needed.          Vitals Value Taken Time   /72 07/14/22 1423   Temp 36.4 °C (97.5 °F) 07/14/22 1420   Pulse 83 07/14/22 1430   Resp 21 07/14/22 1430   SpO2 94 % 07/14/22 1430         No case tracking events are documented in the log.      Pain/Nicko Score: Pain Rating Prior to Med Admin: 6 (7/14/2022  2:30 PM)  Nicko Score: 9 (7/14/2022  2:30 PM)

## 2022-07-14 NOTE — TELEPHONE ENCOUNTER
Spoke with pts daughter in law Rylene and informed her the patient is due for a follow-up appt in Cardiology clinic. Provided Rylene with the clinics number [phone] to get that appt scheduled.

## 2022-07-14 NOTE — H&P
[facility] Orthopaedic Surgery History and Physical    HPI:  Eleanor Buckner is a 67 y.o. right-hand-dominant female  with a comminuted, intra-articular right distal radius fracture sustained on 6/24/2022, now 3 weeks out.    No changes in her health since last seen.  Denies numbness tingling right upper extremity.    To OR today with Dr. Morel for ORIF RIGHT distal radius fracture.      PMH:   Past Medical History:   Diagnosis Date    Hyperlipemia      PSH:   Past Surgical History:   Procedure Laterality Date    APPENDECTOMY      ARTHROPLASTY OF SHOULDER      CHOLECYSTECTOMY      COLONOSCOPY  01/13/2020    ESOPHAGOGASTRODUODENOSCOPY  06/10/2016    HEMORRHOID SURGERY  03/23/2016    PROCTOSCOPY  03/23/2016     SH:   Social History     Socioeconomic History    Marital status:    Tobacco Use    Smoking status: Never Smoker    Smokeless tobacco: Never Used   Substance and Sexual Activity    Alcohol use: Never    Drug use: Never    Sexual activity: Not Currently       FH:   Family History   Problem Relation Age of Onset    Heart failure Mother     Osteoporosis Sister     Osteoporosis Brother     Gout Brother        Allergies:   Review of patient's allergies indicates:   Allergen Reactions    Nsaids (non-steroidal anti-inflammatory drug) Nausea And Vomiting       ROS:  Constitutional- no fever, fatigue, weakness  Eye- no vision loss, eye redness, drainage, or pain  ENMT- no sore throat, ear pain, sinus pain/congestion, nasal congestion/drainage  Respiratory- no cough, wheezing, or shortness of breath  CV- no chest pain, no palpitations, no edema  GI- no N/V/D; no abdominal pain    Physical Exam:  Vitals:    07/14/22 0623   BP: 117/73   Pulse: 68   Resp: 20   Temp: 98 °F (36.7 °C)       General: NAD  Cardio: RRR by peripheral pulse  Pulm: Normal WOB on room air, symmetric chest rise  Abd: Soft, NT/ND    MSK:  Right upper extremity  Volar slab splint intact  Motor intact to AIN/PIN/ulnar  Sensation  intact to light touch in median/ulnar/radial  Fingers are warm well perfused    Imaging:   Review of right wrist x-rays significant for comminuted, intra-articular, dorsally angulated distal radius fracture and ulnar styloid fracture.  Her carpals on dorsal to the radius on lateral view.    Assessment:  67 y.o. right-hand-dominant female  with a comminuted, intra-articular right distal radius fracture sustained on 6/24/2022, now 3 weeks out.    To OR today with Dr. Morel for ORIF RIGHT distal radius fracture.      Kelly Cao MD  [facility] Orthopaedic Surgery  7/14/2022 8:34 AM

## 2022-07-14 NOTE — OP NOTE
Orthopaedic Operative Report    ELEANOR ALVAREZ  : 1955  MRN: [id number]      Date of Procedure: 2022     Pre-op Diagnosis:      Right Distal radius fracture      Post-op Diagnosis:       Same     Post-Op Diagnosis Codes:     * Other closed intra-articular fracture of distal end of right radius, initial encounter [S52.571A]    Procedure:     Right distal radius open reduction and internal fixation           Implants:     Implant Name Type Inv. Item Serial No.  Lot No. LRB No. Used Action   geminus volar distal radius right 4 hole plate     [employer]  Right 1 Implanted   PEG GEMINUS SMTH JOHNNY 2X18MM - [id number]  PEG GEMINUS SMTH JOHNNY 2X18MM  [employer]  Right 1 Implanted   smooth peg locking 19mm    [employer]  Right 1 Implanted   PEG GEMINUS SMTH JOHNNY 2X20MM - [id number]  PEG GEMINUS SMTH JOHNNY 2X20MM  [employer]  Right 3 Implanted   hign compression locking peg     [employer]  Right 1 Implanted   HIGH COMPRESSION LOCKING PEG 20MM    [employer]  Right 1 Implanted   HIGH COMPRESSION LOCKING PEG 22MM     [employer]  Right 1 Implanted and Explanted   SCREW, CORTICAL LOCKING 10MM      Right 2 Implanted   SCREW, CORTICAL LOCKING 11M    [employer]  Right 1 Implanted   SCREW GEMINUS NLOK 3.5X10MM - [id number]  SCREW GEMINUS NLOK 3.5X10MM  [employer]  Right 1 Implanted       Surgeon: Dr Emery Morel     Resident Surgeon:      Dr Cao PGY5    Anesthesiologist:  see record    Anesthesia:      general    Tourniquet: 250mmHg for 54 min    Estimated Blood Loss: 30cc's         Complications: None    Specimens: none    Preoperative note:     Eleanor Alvarez is a 67 y.o. female who presented to our clinic for follow up assessment and definitive management of their distal radius fracture. While in clinic the risks, benefits, outcomes, and alternatives of conservative versus operative management  were discussed with the patient. Informed consent was obtained for an open reduction and internal fixation vs dorsal wrist spanning vs external fixation of distal radius fracture.    Procedure in detail:     The patient was brought into the operating room, positioned supine on the OR table. The arm was prepped and draped in the usual fashion. A preoperative pause was performed to confirm the site and side for surgery. Preoperative antibiotics were given. The upper extremity was exsanguinated and tourniquet inflated to 250mmHg.    A standard modified volar Rashard/trans-FCR approach to the distal radius was performed. We began by sharply incising skin and taking our incision though subcutaneous tissue and fat. Electrocautery was used to obtain meticulous hemostasis. The FCR tendon was identified and the sheath sharply incised inline with the fibers. The FCR tendon was then retracted ulnarly to expose the underlying FPL tendon. The FPL tendon was also retracted ulnarly to expose pronator quadratus. Pronator quadratus was then elevated in a subperiosteal fashion from the radial insertion. The fracture was exposed and thoroughly irrigated with sterile saline. Using a combination of axial traction as well as extension and flexion forces the distal radial fracture was anatomically reduced. The reduction was confirmed under intraoperative fluoroscopy.     We then began stabilizing the fracture by positioning our plate on radial shaft, holding it in place using the oblong hole and a 3.5 mm cortical screw. We placed smooth K-wires in the trajectory target locking holes in the distal aspect of the plate to confirm the trajectory of the screws. This was screened under intraoperative fluoroscopy to confirm that all of our screw structures would be extra-articular.     Once we were happy with the position of the plate, we placed a high compression locking screw on both the radial styloid fragment and lunate facet fragment to  secure them to the plate. The remaining distal locking holes were filled with smooth locking pegs. We then turned our attention to the radial shaft. We stabilized this with a combination of 3.5 mm cortical and locking screws. We again screened the fracture under intraoperative fluoroscopy and confirmed appropriate reduction and position of our hardware.     The wound was thoroughly irrigated with sterile saline. The tourniquet was let down, and meticulous hemostasis was obtained. The subcutaneous tissue was closed with a 2-0 Vicryl suture, while skin was closed with horizontal mattress 3-0 nylon sutures. A sterile dressing was then placed to cover the patient's wound and short-arm splint was applied. The anesthetic was then reversed, the patient transferred to a hospital stretcher, and brought to the recovery room in stable condition.     I was present for all critical steps of the procedure    Postoperative plan: The patient will follow up with us in clinic in 2 weeks' time for splint removal, repeat x-ray, wound check, suture removal, and instructions on beginning range of motion exercises.

## 2022-07-14 NOTE — DISCHARGE SUMMARY
[facility] - Periop Services  Discharge Note  Short Stay    Procedure(s) (LRB):  ORIF, FRACTURE, RADIUS, DISTAL (Right)    OUTCOME: Patient tolerated treatment/procedure well without complication and is now ready for discharge.    DISPOSITION: Home or Self Care    FINAL DIAGNOSIS:  Closed fracture of right distal radius    FOLLOWUP: In clinic    DISCHARGE INSTRUCTIONS:  No discharge procedures on file.     TIME SPENT ON DISCHARGE: 30 minutes

## 2022-07-14 NOTE — DISCHARGE INSTRUCTIONS
Patient may bend elbow and move fingers as tolerable.    Patient may remove sling as needed, but wear it if it's more comfortable.  Keep the splint on and clean/dry for two weeks until follow up appointment in clinic.   NO lifting of anything heavier than 5 pounds for two weeks.

## 2022-07-14 NOTE — ANESTHESIA PROCEDURE NOTES
Intubation    Date/Time: 7/14/2022 12:19 PM  Performed by: Bubba Hendricks CRNA  Authorized by: Ginny Carnes MD     Intubation:     Induction:  Intravenous    Intubated:  Postinduction    Attempts:  1    Attempted By:  CRNA    Method of Intubation:  Direct and video laryngoscopy    Blade:  Roberts 4    Laryngeal View Grade: Grade I - full view of cords      Difficult Airway Encountered?: No      Complications:  None    Airway Device:  Oral endotracheal tube    Airway Device Size:  7.5    Style/Cuff Inflation:  Cuffed (inflated to minimal occlusive pressure)    Inflation Amount (mL):  8    Tube secured:  23    Secured at:  The lips    Placement Verified By:  Capnometry    Complicating Factors:  None, small mouth, short neck, obesity and oropharyngeal edema or fat    Findings Post-Intubation:  BS equal bilateral and atraumatic/condition of teeth unchanged

## 2022-07-15 VITALS
DIASTOLIC BLOOD PRESSURE: 73 MMHG | HEART RATE: 79 BPM | OXYGEN SATURATION: 97 % | TEMPERATURE: 98 F | RESPIRATION RATE: 20 BRPM | WEIGHT: 293 LBS | SYSTOLIC BLOOD PRESSURE: 133 MMHG | BODY MASS INDEX: 64.64 KG/M2

## 2022-07-18 ENCOUNTER — TELEPHONE (OUTPATIENT)
Dept: INTERNAL MEDICINE | Facility: CLINIC | Age: 67
End: 2022-07-18
Payer: MEDICARE

## 2022-07-18 NOTE — TELEPHONE ENCOUNTER
Patient called stating that on her labs she got done before her surgery, her vitamin D level came back low and she's requesting a vitamin D supplement be sent to the pharmacy. Please advise.

## 2022-07-19 RX ORDER — ERGOCALCIFEROL 1.25 MG/1
50000 CAPSULE ORAL
Qty: 12 CAPSULE | Refills: 0 | Status: SHIPPED | OUTPATIENT
Start: 2022-07-19 | End: 2022-11-30 | Stop reason: SDUPTHER

## 2022-07-26 RX ORDER — ALBUTEROL SULFATE 90 UG/1
AEROSOL, METERED RESPIRATORY (INHALATION) EVERY 4 HOURS PRN
COMMUNITY
Start: 2021-11-30

## 2022-07-26 RX ORDER — GABAPENTIN 100 MG/1
CAPSULE ORAL
COMMUNITY
End: 2022-11-04

## 2022-07-26 RX ORDER — HYDROCODONE BITARTRATE AND ACETAMINOPHEN 10; 325 MG/1; MG/1
1 TABLET ORAL EVERY 6 HOURS PRN
COMMUNITY
End: 2022-12-29 | Stop reason: ALTCHOICE

## 2022-07-26 RX ORDER — PRAMIPEXOLE DIHYDROCHLORIDE 0.12 MG/1
TABLET ORAL
COMMUNITY
End: 2022-11-30

## 2022-07-26 RX ORDER — HYDROGEN PEROXIDE 3 %
SOLUTION, NON-ORAL MISCELLANEOUS
COMMUNITY
End: 2022-11-04 | Stop reason: SDUPTHER

## 2022-07-26 RX ORDER — TRAMADOL HYDROCHLORIDE 50 MG/1
50 TABLET ORAL EVERY 6 HOURS PRN
COMMUNITY
Start: 2022-07-01 | End: 2022-12-29 | Stop reason: ALTCHOICE

## 2022-07-26 RX ORDER — ATOMOXETINE 40 MG/1
CAPSULE ORAL
COMMUNITY
End: 2022-07-27

## 2022-07-26 RX ORDER — AZITHROMYCIN 250 MG/1
TABLET, FILM COATED ORAL
COMMUNITY
Start: 2021-05-21 | End: 2022-12-01 | Stop reason: ALTCHOICE

## 2022-07-27 ENCOUNTER — HOSPITAL ENCOUNTER (OUTPATIENT)
Dept: RADIOLOGY | Facility: HOSPITAL | Age: 67
Discharge: HOME | End: 2022-07-27
Attending: ORTHOPAEDIC SURGERY
Payer: MEDICARE

## 2022-07-27 ENCOUNTER — OFFICE VISIT (OUTPATIENT)
Dept: ORTHOPEDICS | Facility: CLINIC | Age: 67
End: 2022-07-27
Payer: MEDICARE

## 2022-07-27 ENCOUNTER — OFFICE VISIT (OUTPATIENT)
Dept: INTERNAL MEDICINE | Facility: CLINIC | Age: 67
End: 2022-07-27
Payer: MEDICARE

## 2022-07-27 VITALS
SYSTOLIC BLOOD PRESSURE: 101 MMHG | TEMPERATURE: 100 F | WEIGHT: 293 LBS | DIASTOLIC BLOOD PRESSURE: 62 MMHG | BODY MASS INDEX: 65.84 KG/M2 | HEART RATE: 64 BPM

## 2022-07-27 VITALS — BODY MASS INDEX: 53.92 KG/M2 | HEIGHT: 62 IN | WEIGHT: 293 LBS

## 2022-07-27 DIAGNOSIS — S52.551A OTHER CLOSED EXTRA-ARTICULAR FRACTURE OF DISTAL END OF RIGHT RADIUS, INITIAL ENCOUNTER: Primary | ICD-10-CM

## 2022-07-27 DIAGNOSIS — S52.551A OTHER CLOSED EXTRA-ARTICULAR FRACTURE OF DISTAL END OF RIGHT RADIUS, INITIAL ENCOUNTER: ICD-10-CM

## 2022-07-27 DIAGNOSIS — F41.9 ANXIETY: ICD-10-CM

## 2022-07-27 PROCEDURE — 99214 OFFICE O/P EST MOD 30 MIN: CPT | Mod: PBBFAC,27

## 2022-07-27 PROCEDURE — 99214 OFFICE O/P EST MOD 30 MIN: CPT | Mod: PBBFAC | Performed by: NURSE PRACTITIONER

## 2022-07-27 PROCEDURE — 73110 X-RAY EXAM OF WRIST: CPT | Mod: TC,RT

## 2022-07-27 PROCEDURE — 99024 POSTOP FOLLOW-UP VISIT: CPT | Mod: POP,,, | Performed by: ORTHOPAEDIC SURGERY

## 2022-07-27 PROCEDURE — 99024 PR POST-OP FOLLOW-UP VISIT: ICD-10-PCS | Mod: POP,,, | Performed by: ORTHOPAEDIC SURGERY

## 2022-07-27 PROCEDURE — 99214 OFFICE O/P EST MOD 30 MIN: CPT | Mod: S$PBB,,, | Performed by: NURSE PRACTITIONER

## 2022-07-27 PROCEDURE — 99214 PR OFFICE/OUTPT VISIT, EST, LEVL IV, 30-39 MIN: ICD-10-PCS | Mod: S$PBB,,, | Performed by: NURSE PRACTITIONER

## 2022-07-27 RX ORDER — BUPROPION HYDROCHLORIDE 150 MG/1
150 TABLET ORAL DAILY
Qty: 30 TABLET | Refills: 3 | Status: SHIPPED | OUTPATIENT
Start: 2022-07-27 | End: 2023-01-03 | Stop reason: SDUPTHER

## 2022-07-27 NOTE — PROGRESS NOTES
[facility]  Established Patient Office Visit  07/27/2022       Patient ID: Eleanor Buckner  YOB: 1955  MRN: [id number]    Diagnosis:    The encounter diagnosis was Other closed extra-articular fracture of distal end of right radius, initial encounter.     Procedure:     Orif, Fracture, Radius, Distal - Right on 7/14/2022      Chief Complaint: Pain of the Right Forearm and Post-op Evaluation      Eleanor Buckner is a 67 y.o. female who presents for follow up treatment of the above mentioned diagnosis. The patient's last visit with me was on 7/8/2022.  Overall she is doing hospital.  Denies any pain in the right wrist.  Is no longer taking any pain medication.  Has been compliant with splint wear and nonweightbearing      Past Medical History:    Past Medical History:   Diagnosis Date    Hyperlipemia      Past Surgical History:   Procedure Laterality Date    APPENDECTOMY      ARTHROPLASTY OF SHOULDER      CHOLECYSTECTOMY      COLONOSCOPY  01/13/2020    ESOPHAGOGASTRODUODENOSCOPY  06/10/2016    HEMORRHOID SURGERY  03/23/2016    OPEN REDUCTION AND INTERNAL FIXATION (ORIF) OF FRACTURE OF DISTAL RADIUS Right 7/14/2022    Procedure: ORIF, FRACTURE, RADIUS, DISTAL;  Surgeon: Emery Morel MD;  Location: [facility];  Service: Orthopedics;  Laterality: Right;    PROCTOSCOPY  03/23/2016     Family History   Problem Relation Age of Onset    Heart failure Mother     Osteoporosis Sister     Osteoporosis Brother     Gout Brother      Social History     Socioeconomic History    Marital status:    Tobacco Use    Smoking status: Never Smoker    Smokeless tobacco: Never Used   Substance and Sexual Activity    Alcohol use: Never    Drug use: Never    Sexual activity: Not Currently     Medication List with Changes/Refills   Current Medications    ALBUTEROL (PROVENTIL/VENTOLIN HFA) 90 MCG/ACTUATION INHALER    Inhale into the lungs.    ALENDRONATE (FOSAMAX) 70  MG TABLET    Take 1 tablet (70 mg total) by mouth once a week.    AZITHROMYCIN (Z-ELIZABETH) 250 MG TABLET    Take by mouth.    BACLOFEN (LIORESAL) 20 MG TABLET    Take 1 tablet (20 mg total) by mouth 3 (three) times daily as needed.    BUDESONIDE (RINOCORT AQUA) 32 MCG/ACTUATION NASAL SPRAY    1 spray by Nasal route once daily at 6am.    BUPROPION (WELLBUTRIN XL) 150 MG TB24 TABLET    Take 1 tablet (150 mg total) by mouth once daily.    DOCUSATE SODIUM 100 MG CAPSULE    Take 100 mg by mouth daily as needed.    ERGOCALCIFEROL (ERGOCALCIFEROL) 50,000 UNIT CAP    Take 1 capsule (50,000 Units total) by mouth every 7 days.    ESOMEPRAZOLE (NEXIUM) 20 MG CAPSULE    Nexium Take No date recorded No form recorded No frequency recorded No route recorded No set duration recorded No set duration amount recorded active No dosage strength recorded No dosage strength units of measure recorded    ESOMEPRAZOLE (NEXIUM) 40 MG CAPSULE    Take 1 capsule (40 mg total) by mouth once daily at 6am.    FLUTICASONE PROPIONATE (FLONASE) 50 MCG/ACTUATION NASAL SPRAY    2 sprays by Each Nostril route once daily at 6am.    GABAPENTIN (NEURONTIN) 100 MG CAPSULE    gabapentin Take No date recorded No form recorded No frequency recorded No route recorded No set duration recorded No set duration amount recorded active No dosage strength recorded No dosage strength units of measure recorded    GABAPENTIN (NEURONTIN) 600 MG TABLET    Take 1 tablet (600 mg total) by mouth 3 (three) times daily.    HYDROCODONE-ACETAMINOPHEN (NORCO)  MG PER TABLET    Take 1 tablet by mouth every 6 (six) hours as needed.    LORATADINE (CLARITIN) 10 MG TABLET    Take 10 mg by mouth once daily at 6am.    PRAMIPEXOLE (MIRAPEX) 0.125 MG TABLET    Mirapex Take No date recorded No form recorded No frequency recorded No route recorded No set duration recorded No set duration amount recorded active No dosage strength recorded No dosage strength units of measure recorded     PRAMIPEXOLE (MIRAPEX) 0.5 MG TABLET    Take 1 tablet (0.5 mg total) by mouth every evening.    ROSUVASTATIN (CRESTOR) 10 MG TABLET    Take 1 tablet (10 mg total) by mouth once daily at 6am.    SERTRALINE (ZOLOFT) 100 MG TABLET    Take 2 tablets (200 mg total) by mouth once daily.    TRAMADOL (ULTRAM) 50 MG TABLET    Take 50 mg by mouth every 6 (six) hours as needed.    VENTOLIN HFA 90 MCG/ACTUATION INHALER    Inhale 2 puffs into the lungs every 6 (six) hours as needed.     Review of patient's allergies indicates:   Allergen Reactions    Nsaids (non-steroidal anti-inflammatory drug) Nausea And Vomiting       ROS:    Body mass index is 65.84 kg/m².  GENERAL: Well appearing, appropriate for stated age, no acute distress.  CARDIOVASCULAR: Pulses regular by peripheral palpation.  PULMONARY: Respirations are even and non-labored.  NEURO: Awake, alert, and oriented x 3.  PSYCH: Mood & affect are appropriate.  HEENT: Head is normocephalic and atraumatic.    Physical Exam:    Right arm:  She is neurovascularly intact distally in the right arm.  Limited range of motion of the right wrist secondary to immobilization.  Wound is clean dry and intact no infection.    Imaging:    Relevant imaging results reviewed and interpreted by me, discussed with the patient and / or family today.  X-ray of the right wrist performed today redemonstrate stable alignment of distal radius fracture with hardware in appropriate position.    Assessment and Plan:    Eleanor Buckner is a 67 y.o. female seen in the office today for The encounter diagnosis was Other closed extra-articular fracture of distal end of right radius, initial encounter..  Overall patient doing very well now 2 weeks status post open reduction and internal fixation of right distal radius fracture.  We will transition her to a removable Velcro brace in clinic today.  Educated the patient on range-of-motion exercises of like to perform over the next 4 weeks.  Continued  nonweightbearing on the right upper extremity and does see her back in 4 weeks time.      Orders Placed This Encounter    X-Ray Wrist Complete Right

## 2022-07-27 NOTE — PROGRESS NOTES
Initial Interview 07/27/2022  HPI: 68yo WFref[facility]  PMHx: HLD, GERD, Osteoporosis, Anxiety.    Patient presents with her .   Patient explains that although she is taking Zoloft 200mg q HS, she is still anxious and has low energy. She gets discouraged because she cannot do the things that she used to do.   States that she used to be able to do housework but now she does not have the energy. She stopped doing housework 3 months ago.   States that she has been depressed all of her life; since around 8 or 9.   Her mother and father . Her father was an alcoholic. They would argue and she would get nervous. She is still nervous. Worries about everything and every body.     States that she stopped doing things 3 months ago because she has a hard time standing up. Her back gets weak.   She fell and broke her arm about a month ago and had surgery two weeks ago.   One month before breaking her arm, she broke her foot while climbing into the truck. She could not get her leg up high enough to step onto the step to get into the truck. The step broke.    In 2012, she fell and had to have shoulder replacement.     States that she has been taking Zoloft for about 10 years and it has worked well for her in the past.     Has not had a sleep study.   States that she does snore at night. Naps off and on during the day.   Does not feel that she could keep a mask on.   Requested PCP consider a sleep study.     Patient admits that she sits in the house a lot because she fears that she is going to fall. Both times that she fell, she reports that she had difficulty lifting her R leg. She fears that she is going to fall again.   Explained that the more she sits, the weaker she will become.  Strongly encouraged that she requests to go to PT to gain strength and confidence.     Will continue the Zoloft 200mg q HS.  Add Wellbutrin XL 150mg q day   FU in 3  weeks      Psychiatric History:   Reports a history of: depression and anxiety  History of mental health out-patient treatment:  History of in-patient psychiatric hospitalization: denies  History of suicidal ideations: denies  History of suicidal threats:  History of suicide attempts: denies  History of self mutilation: denies    History of psychotropic medications:   Strattera 40mg q day  Zoloft   Wellbutrin    Family Psychiatric History:  Mental Illness: denies  Alcohol abuse/addiction: father  Drug addiction:    Substance Use History:  Hx of addiction or abuse: denies  Alcohol: denies  Amphetamines: denies  Benzodiazepines: denies  Cocaine: denies  Opiates: recent Rx for Noco after breaking her arm  Marijuana: denies  Tobacco: denies  Caffeine:    Social History:  Grew up in: [place]  Raised by: mother  Number of siblings: one sister and two brothers  Education: 9th grade  Sexual identity: heterosexual  Marital status:  x 51 years  Number of children: 4 adult sons  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    PHQ score:  07/27/2022: 16 - moderately severe  Over the last two weeks how often have you been bothered by little interest or pleasure in doing things Nearly every day   Over the last two weeks how often have you been bothered by feeling down, depressed or hopeless Nearly every day   Over the last two weeks how often have you been bothered by trouble falling or staying asleep, or sleeping too much Nearly every day     Over the last two weeks how often have you been bothered by feeling tired or having little energy Nearly every day   Over  the last two weeks how often have you been bothered by a poor appetite or overeating Not at all   Over the last two weeks how often have you been bothered by feeling bad about yourself - or that you are a failure or have let yourself or your family down Several days   Over the last two weeks how often have you been bothered by trouble concentrating on things, such as reading the newspaper or watching television Not at all   Over the last two weeks how often have you been bothered by moving or speaking so slowly that other people could have noticed. Or the opposite - being so fidgety or restless that you have been moving around a lot more than usual. Nearly every day   Over the last two weeks how often have you been bothered by thoughts that you would be better off dead, or of hurting yourself Not at all   If you checked off any problems, how difficult have these problems made it for you to do your work, take care of things at home or get along with other people? Very difficult   PHQ-9 Score 16   PHQ-9 Interpretation Moderately Severe     NELY-7:  07/27/2022: 14 - moderate anxiety  1. Feeling nervous, anxious, or on edge? Nearly everyday   2. Not being able to stop or control worrying? Nearly everyday   3. Worrying too much about different things? Nearly everyday   4. Trouble relaxing? Nearly everyday   5. Being so restless that it is hard to sit still? Not at all   6. Becoming easily annoyed or irritable? Several days   7. Feeling afraid as if something awful might happen? Several days   8. If you checked off any problems, how difficult have these problems made it for you to do your work, take care of things at home, or get along with other people? Very difficult   NELY-7 Score 14   Number answered (out of first 7) 7   Interpretation Moderate Anxiety     Mental Status Evaluation:  Appearance:  casually dressed, neatly groomed, obese, seated in wheelchair   Behavior:  cooperative; restless   Speech:  no latency; no  press   Mood:  anxious   Affect:  guarded, mood-congruent   Thought Process:  normal and logical   Thought Content:  normal, no suicidality, no homicidality, delusions, or paranoia   Sensorium:  grossly intact   Cognition:  grossly intact   Insight:  intact   Judgment:  behavior is adequate to circumstances     Impression:  1. Depression - mod severe  2. Anxiety - moderate  3. Difficulty sleeping  4. May need PT    Plan:  1. Zoloft 200mg q HS  2. Wellbutrin XL 150mg q day  3. Sleep study???  4. FU in 3 weeks

## 2022-08-04 ENCOUNTER — OFFICE VISIT (OUTPATIENT)
Dept: CARDIOLOGY | Facility: CLINIC | Age: 67
End: 2022-08-04
Payer: MEDICARE

## 2022-08-04 VITALS
RESPIRATION RATE: 20 BRPM | SYSTOLIC BLOOD PRESSURE: 122 MMHG | DIASTOLIC BLOOD PRESSURE: 76 MMHG | BODY MASS INDEX: 53.92 KG/M2 | WEIGHT: 293 LBS | TEMPERATURE: 99 F | OXYGEN SATURATION: 99 % | HEART RATE: 69 BPM | HEIGHT: 62 IN

## 2022-08-04 DIAGNOSIS — R06.09 DOE (DYSPNEA ON EXERTION): Primary | ICD-10-CM

## 2022-08-04 DIAGNOSIS — E66.01 MORBID OBESITY: ICD-10-CM

## 2022-08-04 DIAGNOSIS — E78.5 HYPERLIPIDEMIA LDL GOAL <100: ICD-10-CM

## 2022-08-04 DIAGNOSIS — R60.0 PERIPHERAL EDEMA: ICD-10-CM

## 2022-08-04 PROCEDURE — 93005 ELECTROCARDIOGRAM TRACING: CPT

## 2022-08-04 PROCEDURE — 99215 OFFICE O/P EST HI 40 MIN: CPT | Mod: PBBFAC | Performed by: NURSE PRACTITIONER

## 2022-08-04 PROCEDURE — 99214 OFFICE O/P EST MOD 30 MIN: CPT | Mod: S$PBB,,, | Performed by: NURSE PRACTITIONER

## 2022-08-04 PROCEDURE — 99214 PR OFFICE/OUTPT VISIT, EST, LEVL IV, 30-39 MIN: ICD-10-PCS | Mod: S$PBB,,, | Performed by: NURSE PRACTITIONER

## 2022-08-04 NOTE — PROGRESS NOTES
CHIEF COMPLAINT:   Chief Complaint   Patient presents with    4 month follow up with stress test results     C/O SOB, feet swelling. Patient has concerns of recent EKG results. (7/13/22)                   Review of patient's allergies indicates:   Allergen Reactions    Nsaids (non-steroidal anti-inflammatory drug) Nausea And Vomiting                                          HPI:  Eleanor Buckner 67 y.o. female with a past medical history of Morbid Obesity and HLD presents for follow up and results of Nuclear Stress Test. Patient completed a Lexiscan Stress Test on 5.13.22 which was abnormal revealing a mild intensity, small sized, fixed perfusion abnormality consistent with scar in the apical wall(s) in the typical distribution of the LAD territory.  Patient completed an chocardiogram in November 2021 which revealed an ejection fraction of approximately 55%. See report below. She also completed LINDY testing in November 2021 which revealed no evidence of significant arterial insufficiency. She completed a nuclear stress test on 9.19.19 that revealed no ischemia and no wall motion abnormality. She completed PFTs that revealed no abnormalities.     Patient continues to endorse shortness of breath with ambulation as well as bilateral lower extremity edema.  She denies chest pain, palpitations, or syncope.  She states she continues to use a walker at home due to balance issues and states she was recently referred for physical therapy.  She reports compliance with her Crestor.                                                                                                                                                                                                                                                                                                                                                                                                                                                                                     CARDIAC TESTING:  No results found for this or any previous visit.    Results for orders placed during the hospital encounter of 05/13/22    Nuclear Stress - Cardiology Interpreted    Interpretation Summary    Abnormal myocardial perfusion scan.    There is a mild intensity, small sized, fixed perfusion abnormality consistent with scar in the apical wall(s) in the typical distribution of the LAD territory.    There are no other significant perfusion abnormalities.    The gated perfusion images showed an ejection fraction of 64% post stress.    The patient reported no chest pain during the stress test.       Echocardiogram November 2021:  Left ventricular ejection fraction is measured at approximately 55%.  Structurally normal mitral valve.  Structurally trileaflet aortic valve.  No evidence of tricuspid regurgitation.  No evidence of pulmonic regurgitation.  Mildly dilated left atrium.  Normal right atrial size.  Right ventricular cavity is normal in size.  No evidence of a pericardial effusion.  No evidence of pleural effusion.  IVC is normal.    LINDY testing November 2021:  No evidence of significant arterial insufficiency was identified on the study.    Lexiscan 9.19.19  EF 59% with no wall motion abnormalities  No ischemia, inferior scar    TTE 7.13.19  Left ventricular ejection fraction measured approximately 55%  Moderate dilated left atrium  There is mild tricuspid regurgitation with estimated RVSP of 36 mmHg  Trace pulmonic regurgitation present       Patient Active Problem List   Diagnosis    Osteoporosis    GERD (gastroesophageal reflux disease)    Hyperlipidemia LDL goal <100    Anxiety    CANO (dyspnea on exertion)    Morbid obesity    Closed fracture of right distal radius    Peripheral edema     Past Surgical History:   Procedure Laterality Date    APPENDECTOMY      ARTHROPLASTY OF SHOULDER      CHOLECYSTECTOMY      COLONOSCOPY  01/13/2020     ESOPHAGOGASTRODUODENOSCOPY  06/10/2016    HEMORRHOID SURGERY  03/23/2016    OPEN REDUCTION AND INTERNAL FIXATION (ORIF) OF FRACTURE OF DISTAL RADIUS Right 7/14/2022    Procedure: ORIF, FRACTURE, RADIUS, DISTAL;  Surgeon: Emery Morel MD;  Location: [facility];  Service: Orthopedics;  Laterality: Right;    PROCTOSCOPY  03/23/2016     Social History     Socioeconomic History    Marital status:    Tobacco Use    Smoking status: Never Smoker    Smokeless tobacco: Never Used   Substance and Sexual Activity    Alcohol use: Never    Drug use: Never    Sexual activity: Not Currently        Family History   Problem Relation Age of Onset    Heart failure Mother     Osteoporosis Sister     Osteoporosis Brother     Gout Brother          Current Outpatient Medications:     albuterol (PROVENTIL/VENTOLIN HFA) 90 mcg/actuation inhaler, Inhale into the lungs., Disp: , Rfl:     alendronate (FOSAMAX) 70 MG tablet, Take 1 tablet (70 mg total) by mouth once a week., Disp: 12 tablet, Rfl: 2    baclofen (LIORESAL) 20 MG tablet, Take 1 tablet (20 mg total) by mouth 3 (three) times daily as needed., Disp: 60 tablet, Rfl: 1    budesonide (RINOCORT AQUA) 32 mcg/actuation nasal spray, 1 spray by Nasal route once daily at 6am., Disp: , Rfl:     buPROPion (WELLBUTRIN XL) 150 MG TB24 tablet, Take 1 tablet (150 mg total) by mouth once daily., Disp: 30 tablet, Rfl: 3    docusate sodium 100 mg capsule, Take 100 mg by mouth daily as needed., Disp: , Rfl:     ergocalciferol (ERGOCALCIFEROL) 50,000 unit Cap, Take 1 capsule (50,000 Units total) by mouth every 7 days., Disp: 12 capsule, Rfl: 0    esomeprazole (NEXIUM) 40 MG capsule, Take 1 capsule (40 mg total) by mouth once daily at 6am., Disp: 90 capsule, Rfl: 2    fluticasone propionate (FLONASE) 50 mcg/actuation nasal spray, 2 sprays by Each Nostril route once daily at 6am., Disp: , Rfl:     gabapentin (NEURONTIN) 600 MG tablet, Take 1 tablet (600 mg total) by mouth 3  (three) times daily., Disp: 90 tablet, Rfl: 6    HYDROcodone-acetaminophen (NORCO)  mg per tablet, Take 1 tablet by mouth every 6 (six) hours as needed., Disp: , Rfl:     loratadine (CLARITIN) 10 mg tablet, Take 10 mg by mouth once daily at 6am., Disp: , Rfl:     rosuvastatin (CRESTOR) 10 MG tablet, Take 1 tablet (10 mg total) by mouth once daily at 6am., Disp: 90 tablet, Rfl: 2    sertraline (ZOLOFT) 100 MG tablet, Take 2 tablets (200 mg total) by mouth once daily., Disp: 180 tablet, Rfl: 2    VENTOLIN HFA 90 mcg/actuation inhaler, Inhale 2 puffs into the lungs every 6 (six) hours as needed., Disp: 18 g, Rfl: 2    azithromycin (Z-ELIZABETH) 250 MG tablet, Take by mouth., Disp: , Rfl:     esomeprazole (NEXIUM) 20 MG capsule, Nexium Take No date recorded No form recorded No frequency recorded No route recorded No set duration recorded No set duration amount recorded active No dosage strength recorded No dosage strength units of measure recorded, Disp: , Rfl:     gabapentin (NEURONTIN) 100 MG capsule, gabapentin Take No date recorded No form recorded No frequency recorded No route recorded No set duration recorded No set duration amount recorded active No dosage strength recorded No dosage strength units of measure recorded, Disp: , Rfl:     pramipexole (MIRAPEX) 0.125 MG tablet, Mirapex Take No date recorded No form recorded No frequency recorded No route recorded No set duration recorded No set duration amount recorded active No dosage strength recorded No dosage strength units of measure recorded, Disp: , Rfl:     pramipexole (MIRAPEX) 0.5 MG tablet, Take 1 tablet (0.5 mg total) by mouth every evening., Disp: 90 tablet, Rfl: 2    traMADoL (ULTRAM) 50 mg tablet, Take 50 mg by mouth every 6 (six) hours as needed., Disp: , Rfl:      ROS:                                                                                                                                                                            "  Review of Systems   Constitutional: Negative.    Respiratory: Positive for shortness of breath.    Cardiovascular: Positive for leg swelling.   Gastrointestinal: Negative.    Musculoskeletal: Negative.         BLE pain   Skin: Negative.    Neurological: Negative.    Psychiatric/Behavioral: Negative.         Blood pressure 122/76, pulse 69, temperature 98.5 °F (36.9 °C), resp. rate 20, height 5' 1.97" (1.574 m), weight (!) 162.3 kg (357 lb 12.9 oz), SpO2 99 %.   PE:  Physical Exam  Constitutional:       Appearance: Normal appearance.   HENT:      Head: Normocephalic and atraumatic.   Eyes:      Extraocular Movements: Extraocular movements intact.      Pupils: Pupils are equal, round, and reactive to light.   Cardiovascular:      Rate and Rhythm: Normal rate and regular rhythm.   Pulmonary:      Effort: Pulmonary effort is normal.      Breath sounds: Normal breath sounds.   Abdominal:      Palpations: Abdomen is soft.   Musculoskeletal:         General: Normal range of motion.      Cervical back: Normal range of motion. No tenderness.      Right lower leg: Edema present.      Left lower leg: Edema present.   Skin:     General: Skin is warm and dry.   Neurological:      General: No focal deficit present.      Mental Status: She is alert and oriented to person, place, and time.   Psychiatric:         Mood and Affect: Mood normal.         Behavior: Behavior normal.        ASSESSMENT/PLAN:  CANO - unchanged  - Lexiscan Stress Test 5.13.22 - abnormal -  a mild intensity, small sized, fixed perfusion abnormality consistent with scar in the apical wall(s) in the typical distribution of the LAD territory.  - EF 55% per Echo Nov. 2021  - LVEF -55% (TTE 7.3.19)   - Lexiscan stress test - negative for ischemia with no wall motion abnormalities (9.19.19)  - PFTs - normal  - Counseled patient on the importance of low-sodium, heart healthy diet and increased activity as tolerated    HLD  - LDL at goal - 84  - Continue Crestor 10mg " daily   - Counseled on low-cholesterol, low-fat diet, and exercise as tolerated    Leg cramping/pain/edema  - LINDY testing Nov. 2021 - No evidence of significant arterial insufficiency was identified on the study.  - Will order BLE venous reflux studies  - Recommend low salt diet, leg elevation, and compression stockings if able    Morbid obesity  - Counseled on weight loss measures such as diet and increased activity as tolerated    EKG today  BLE Venous Reflux studies  Follow up in Cardiology Clinic in 3 months

## 2022-08-11 DIAGNOSIS — R26.89 BALANCE PROBLEM: Primary | ICD-10-CM

## 2022-08-12 ENCOUNTER — TELEPHONE (OUTPATIENT)
Dept: INTERNAL MEDICINE | Facility: CLINIC | Age: 67
End: 2022-08-12
Payer: MEDICARE

## 2022-08-12 NOTE — TELEPHONE ENCOUNTER
Patient called requesting a medication be sent for her cough, she tested positive for covid. Please advise.

## 2022-08-16 RX ORDER — GUAIFENESIN/DEXTROMETHORPHAN 100-10MG/5
5 SYRUP ORAL EVERY 6 HOURS PRN
Qty: 118 ML | Refills: 0 | Status: SHIPPED | OUTPATIENT
Start: 2022-08-16 | End: 2022-08-23

## 2022-08-17 ENCOUNTER — TELEPHONE (OUTPATIENT)
Dept: INTERNAL MEDICINE | Facility: CLINIC | Age: 67
End: 2022-08-17

## 2022-08-29 ENCOUNTER — CLINICAL SUPPORT (OUTPATIENT)
Dept: REHABILITATION | Facility: HOSPITAL | Age: 67
End: 2022-08-29
Payer: MEDICARE

## 2022-08-29 DIAGNOSIS — M25.562 BILATERAL CHRONIC KNEE PAIN: ICD-10-CM

## 2022-08-29 DIAGNOSIS — R29.6 MULTIPLE FALLS: ICD-10-CM

## 2022-08-29 DIAGNOSIS — M25.561 BILATERAL CHRONIC KNEE PAIN: ICD-10-CM

## 2022-08-29 DIAGNOSIS — G89.29 BILATERAL CHRONIC KNEE PAIN: ICD-10-CM

## 2022-08-29 DIAGNOSIS — M62.81 MUSCLE WEAKNESS (GENERALIZED): ICD-10-CM

## 2022-08-29 DIAGNOSIS — R26.81 UNSTEADINESS ON FEET: ICD-10-CM

## 2022-08-29 PROCEDURE — 97162 PT EVAL MOD COMPLEX 30 MIN: CPT

## 2022-08-29 NOTE — PROGRESS NOTES
See Initial Evaluation in POC section     Where Do You Want The Question To Include Opioid Counseling Located?: Case Summary Tab

## 2022-08-29 NOTE — PLAN OF CARE
[facility]PAT[facility]   Physical Therapy Initial Evaluation     Date: 8/29/2022   Name: Eelanor Buckner  Clinic Number: [id number]    Therapy Diagnosis:   Encounter Diagnoses   Name Primary?    Muscle weakness (generalized)     Bilateral chronic knee pain     Unsteadiness on feet     Multiple falls      Physician: No ref. provider found    Physician Orders: PT Eval and Treat   Medical Diagnosis from Referral: post fracture deconditioning, gait and balance deficits  Evaluation Date: 8/29/2022  Authorization Period Expiration: TBD  Plan of Care Expiration: 10/7/2022  Reassessment / POC Due: 9/23/2022  Visit # / Visits authorized: 1/ 18     Precautions: Standard and Fall, SOB with exertion     Time In: 13:10  Time Out: 13:50  Total Appointment Time (timed & untimed codes): 40 minutes    SUBJECTIVE     Date of onset / History of current condition - Eleanor reports: she has had multiple recent calls causing injury. 4 months ago she fell getting out of vehicle and fractured her R foot and 2 months ago she fell in her home causing R wrist fracture requiring surgery. Pt had R TSR a few yrs ago due to a fall. She has been using rollator RW for mobility since. Pt R hand dominant, lives with her  who is willing and able to assist pt as needed, ramp to enter / exit home. Pt has walk-in shower with seat and requires assistance from  for bathing and lower body dressing. Pt very limited with functional mobility activities in and outside of home due to lack of confidence as evidence by ABC scale     Falls: multiple with injury    Prior Therapy: none for gait and balance deficits   Social History:  lives with their spouse  Occupation: does not work  Prior Level of Function: modified Independent  Current Level of Function: supervision using rollator RW, limited due to SOB    Pain:  Current 3/10, worst 8/10, best 0/10   Location: bilateral knee    Description: Aching, Tingling, Deep, Numb, and comes  "and goes  Aggravating Factors: Standing, Laying, Walking, and Lifting  Easing Factors: rest    Patients goals: "walk and move better"     Medical History:   Past Medical History:   Diagnosis Date    Depression     Hyperlipemia        Surgical History:   Eleanor Buckner  has a past surgical history that includes Colonoscopy (2020); Esophagogastroduodenoscopy (06/10/2016); Hemorrhoid surgery (2016); Proctoscopy (2016); Arthroplasty of shoulder; Appendectomy; Cholecystectomy; and Open reduction and internal fixation (ORIF) of fracture of distal radius (Right, 2022).    Medications:   Eleanor has a current medication list which includes the following prescription(s): albuterol, alendronate, azithromycin, baclofen, budesonide, bupropion, docusate sodium, ergocalciferol, esomeprazole, esomeprazole, fluticasone propionate, gabapentin, gabapentin, hydrocodone-acetaminophen, loratadine, pramipexole, pramipexole, rosuvastatin, sertraline, tramadol, and ventolin hfa.    Allergies:   Review of patient's allergies indicates:   Allergen Reactions    Nsaids (non-steroidal anti-inflammatory drug) Nausea And Vomiting      OBJECTIVE     B LE strength: 3+/5 MMT throughout  Functional mobility: supervision with sit to stands, transfers, and amb using rollator RW, step negotiation using B rails performed with difficulty  5x sit to stand: 30 secs required, increased SOB, B knee pain  TU secs using rollator RW  MCTSIB: 2/4 (30,30,15,8)     TREATMENT     Total Treatment time (time-based codes) separate from Evaluation: 5 minutes      Eleanor received the treatments listed below:      therapeutic exercises to develop strength and endurance for 0 minutes including:    neuromuscular re-education activities to improve: Balance and Proprioception for 5 minutes. The following activities were included:    Therapeutic Exercise Grid     Exercise 1  Exercise 2  Exercise 3  Exercise 4    Exercise :    B heel " slides, SLR, sidelying hip abduction LAQ, hip flexion, knee flexion with TB Sit to stands Standing: heel raises, hip flexion, hip abduction, mini-squats   Repetition/Time :                  Resist or Assist :                  Comment :                Done :                                Exercise 5  Exercise 6  Exercise 7  Exercise 8    Exercise :    Step-up   Foam: EO, EC NuStep      Repetition/Time :                  Resist or Assist :                  Comment :                  Done :                                  Exercise 9  Exercise 10  Exercise 11  Exercise 12    Exercise :                  Repetition/Time :                  Resist or Assist :                  Comment :                  Done :                               PATIENT EDUCATION AND HOME EXERCISES     Education provided / Written Home Exercises Provided  Exercises and stretches demonstrated and initiated, written HEP issued, educated pt on importance and benefits of exercises and stretches, and encouraged pt to continue with HEP daily. Pt voiced understanding    ASSESSMENT     Eleanor is a 67 y.o. female referred to outpatient Physical Therapy with a medical diagnosis of post fracture deconditioning, gait and balance deficits. Patient presents with B knee pain, generalized muscle weakness, difficulty with mobility, gait instability, and balance deficits limiting functional mobility and causing decreased confidence in and outside of the home. Pt gets SOB with minimal exertion limiting distance with amb and standing tolerance. Pt would benefit from PT services to address above deficits     Patient prognosis is Fair.   Patient will benefit from skilled outpatient Physical Therapy to address the deficits stated above and in the chart below, provide patient /family education, and to maximize patientt's level of independence.     Plan of care discussed with patient: Yes  Patient's spiritual, cultural and educational needs considered and patient is  agreeable to the plan of care and goals as stated below:     Anticipated Barriers for therapy: obesity, SOB with minimal activity, multiple falls    Goals:  Short Term Goals: 4 weeks     Pt will demonstrate knowledge and independence with HEP to continue with exercises outside of therapy    Reduce c/o pain in B knees to < 2/10 pain level with daily activities    Improve strength in B LE to 4-/5 MMT throughout in order to improve tolerance with overall functional mobility    Long Term Goals: 6 weeks     Improve confidence with functional mobility in and outside of home as evidence by improved     Improve strength in B LE to 4/5 MMT throughout in order to improve tolerance with overall functional mobility    Pt will improve distance and stability with amb using rollator RW performed modified Independent >300 ft and improved TUG score to < 15 secs to improve community amb    Pt will reduce risk for falls and improve ability and safety with transfers as evidence by improved 5x sit to stands to < 20 secs    Pt will improve balance and reduce risk for falls as evidence by improved MCTSIB to 3/4 (30,30,30,15)      PLAN     Outpatient Physical Therapy 3 times weekly for 6 weeks to include the following interventions: Gait Training, Neuromuscular Re-ed, Patient Education, Therapeutic Exercise, and pain management .     Massiel Regalado, PT      I CERTIFY THE NEED FOR THESE SERVICES FURNISHED UNDER THIS PLAN OF TREATMENT AND WHILE UNDER MY CARE   Physician's comments:     Physician's Signature: ___________________________________________________

## 2022-08-31 ENCOUNTER — HOSPITAL ENCOUNTER (OUTPATIENT)
Dept: RADIOLOGY | Facility: HOSPITAL | Age: 67
Discharge: HOME | End: 2022-08-31
Attending: STUDENT IN AN ORGANIZED HEALTH CARE EDUCATION/TRAINING PROGRAM
Payer: MEDICARE

## 2022-08-31 ENCOUNTER — OFFICE VISIT (OUTPATIENT)
Dept: ORTHOPEDICS | Facility: CLINIC | Age: 67
End: 2022-08-31
Payer: MEDICARE

## 2022-08-31 ENCOUNTER — OFFICE VISIT (OUTPATIENT)
Dept: INTERNAL MEDICINE | Facility: CLINIC | Age: 67
End: 2022-08-31
Payer: MEDICARE

## 2022-08-31 VITALS
WEIGHT: 293 LBS | RESPIRATION RATE: 20 BRPM | HEART RATE: 62 BPM | DIASTOLIC BLOOD PRESSURE: 62 MMHG | HEIGHT: 62 IN | BODY MASS INDEX: 53.92 KG/M2 | OXYGEN SATURATION: 97 % | TEMPERATURE: 98 F | SYSTOLIC BLOOD PRESSURE: 113 MMHG

## 2022-08-31 VITALS — HEIGHT: 61 IN | BODY MASS INDEX: 55.32 KG/M2 | WEIGHT: 293 LBS

## 2022-08-31 DIAGNOSIS — S52.551A OTHER CLOSED EXTRA-ARTICULAR FRACTURE OF DISTAL END OF RIGHT RADIUS, INITIAL ENCOUNTER: ICD-10-CM

## 2022-08-31 DIAGNOSIS — E66.01 CLASS 3 SEVERE OBESITY DUE TO EXCESS CALORIES WITH SERIOUS COMORBIDITY AND BODY MASS INDEX (BMI) OF 60.0 TO 69.9 IN ADULT: Chronic | ICD-10-CM

## 2022-08-31 DIAGNOSIS — G47.30 SLEEP APNEA, UNSPECIFIED TYPE: ICD-10-CM

## 2022-08-31 DIAGNOSIS — E55.9 VITAMIN D DEFICIENCY: Chronic | ICD-10-CM

## 2022-08-31 DIAGNOSIS — Z12.31 ENCOUNTER FOR SCREENING MAMMOGRAM FOR MALIGNANT NEOPLASM OF BREAST: ICD-10-CM

## 2022-08-31 DIAGNOSIS — S52.551A OTHER CLOSED EXTRA-ARTICULAR FRACTURE OF DISTAL END OF RIGHT RADIUS, INITIAL ENCOUNTER: Primary | ICD-10-CM

## 2022-08-31 DIAGNOSIS — R06.09 DYSPNEA ON EXERTION: Primary | ICD-10-CM

## 2022-08-31 DIAGNOSIS — R60.0 PERIPHERAL EDEMA: ICD-10-CM

## 2022-08-31 PROCEDURE — 73110 X-RAY EXAM OF WRIST: CPT | Mod: TC,RT

## 2022-08-31 PROCEDURE — 99024 PR POST-OP FOLLOW-UP VISIT: ICD-10-PCS | Mod: POP,,, | Performed by: ORTHOPAEDIC SURGERY

## 2022-08-31 PROCEDURE — 99214 PR OFFICE/OUTPT VISIT, EST, LEVL IV, 30-39 MIN: ICD-10-PCS | Mod: S$PBB,,, | Performed by: NURSE PRACTITIONER

## 2022-08-31 PROCEDURE — 99215 OFFICE O/P EST HI 40 MIN: CPT | Mod: PBBFAC,27 | Performed by: NURSE PRACTITIONER

## 2022-08-31 PROCEDURE — 99214 OFFICE O/P EST MOD 30 MIN: CPT | Mod: S$PBB,,, | Performed by: NURSE PRACTITIONER

## 2022-08-31 PROCEDURE — 99024 POSTOP FOLLOW-UP VISIT: CPT | Mod: POP,,, | Performed by: ORTHOPAEDIC SURGERY

## 2022-08-31 PROCEDURE — 99214 OFFICE O/P EST MOD 30 MIN: CPT | Mod: PBBFAC

## 2022-08-31 RX ORDER — GUAIFENESIN 600 MG/1
1200 TABLET, EXTENDED RELEASE ORAL 2 TIMES DAILY
Qty: 20 TABLET | Refills: 0 | Status: SHIPPED | OUTPATIENT
Start: 2022-08-31 | End: 2022-09-05

## 2022-08-31 RX ORDER — FUROSEMIDE 20 MG/1
10 TABLET ORAL DAILY PRN
Qty: 15 TABLET | Refills: 2 | Status: SHIPPED | OUTPATIENT
Start: 2022-08-31 | End: 2022-10-14 | Stop reason: SDUPTHER

## 2022-08-31 NOTE — PROGRESS NOTES
ATTENDING ADDENDUM    Eleanor Buckner  was evaluated at the time of the encounter with Dr Medina PGY3.  HPI, PE and treatment plan was reviewed. Treatment plan was reasonable and necessary. Imaging was reviewed at the time of visit.     I was present during critical or key resident-provided service and procedure portions of the visit.

## 2022-08-31 NOTE — PROGRESS NOTES
[facility]  Established Patient Office Visit  08/31/2022       Patient ID: Eleanor Buckner  YOB: 1955  MRN: [id number]    Diagnosis:    The encounter diagnosis was Other closed extra-articular fracture of distal end of right radius, initial encounter.     Procedure:     Orif, Fracture, Radius, Distal - Right on 7/14/2022      Chief Complaint: Pain and Post-op Evaluation of the Right Wrist and Follow-up      Eleanor Buckner is a 67 y.o. female who presents for follow up treatment of the above mentioned diagnosis. The patient's last visit with me was on 7/27/2022.  Overall she is doing very well.  Denies any pain in the right wrist.  Is no longer taking any pain medication.  Has been compliant with splint wear and nonweightbearing.  Is able make a full fist and range the wrist.  States she had a small suture abscess on the wrist which she self treated with warm compresses and it is now resolved.      Past Medical History:    Past Medical History:   Diagnosis Date    Depression     Hyperlipemia      Past Surgical History:   Procedure Laterality Date    APPENDECTOMY      ARTHROPLASTY OF SHOULDER      CHOLECYSTECTOMY      COLONOSCOPY  01/13/2020    ESOPHAGOGASTRODUODENOSCOPY  06/10/2016    HEMORRHOID SURGERY  03/23/2016    OPEN REDUCTION AND INTERNAL FIXATION (ORIF) OF FRACTURE OF DISTAL RADIUS Right 7/14/2022    Procedure: ORIF, FRACTURE, RADIUS, DISTAL;  Surgeon: Emery Morel MD;  Location: [facility];  Service: Orthopedics;  Laterality: Right;    PROCTOSCOPY  03/23/2016     Family History   Problem Relation Age of Onset    Heart failure Mother     Osteoporosis Sister     Osteoporosis Brother     Gout Brother      Social History     Socioeconomic History    Marital status:    Tobacco Use    Smoking status: Never    Smokeless tobacco: Never   Substance and Sexual Activity    Alcohol use: Never    Drug use: Never    Sexual activity: Not Currently      Medication List with Changes/Refills   Current Medications    ALBUTEROL (PROVENTIL/VENTOLIN HFA) 90 MCG/ACTUATION INHALER    Inhale into the lungs.    ALENDRONATE (FOSAMAX) 70 MG TABLET    Take 1 tablet (70 mg total) by mouth once a week.    AZITHROMYCIN (Z-ELIZABETH) 250 MG TABLET    Take by mouth.    BACLOFEN (LIORESAL) 20 MG TABLET    Take 1 tablet (20 mg total) by mouth 3 (three) times daily as needed.    BUDESONIDE (RINOCORT AQUA) 32 MCG/ACTUATION NASAL SPRAY    1 spray by Nasal route once daily at 6am.    BUPROPION (WELLBUTRIN XL) 150 MG TB24 TABLET    Take 1 tablet (150 mg total) by mouth once daily.    DOCUSATE SODIUM 100 MG CAPSULE    Take 100 mg by mouth daily as needed.    ERGOCALCIFEROL (ERGOCALCIFEROL) 50,000 UNIT CAP    Take 1 capsule (50,000 Units total) by mouth every 7 days.    ESOMEPRAZOLE (NEXIUM) 20 MG CAPSULE    Nexium Take No date recorded No form recorded No frequency recorded No route recorded No set duration recorded No set duration amount recorded active No dosage strength recorded No dosage strength units of measure recorded    ESOMEPRAZOLE (NEXIUM) 40 MG CAPSULE    Take 1 capsule (40 mg total) by mouth once daily at 6am.    FLUTICASONE PROPIONATE (FLONASE) 50 MCG/ACTUATION NASAL SPRAY    2 sprays by Each Nostril route once daily at 6am.    FUROSEMIDE (LASIX) 20 MG TABLET    Take 0.5 tablets (10 mg total) by mouth daily as needed (swelling).    GABAPENTIN (NEURONTIN) 100 MG CAPSULE    gabapentin Take No date recorded No form recorded No frequency recorded No route recorded No set duration recorded No set duration amount recorded active No dosage strength recorded No dosage strength units of measure recorded    GABAPENTIN (NEURONTIN) 600 MG TABLET    Take 1 tablet (600 mg total) by mouth 3 (three) times daily.    GUAIFENESIN (MUCINEX) 600 MG 12 HR TABLET    Take 2 tablets (1,200 mg total) by mouth 2 (two) times daily. for 5 days    HYDROCODONE-ACETAMINOPHEN (NORCO)  MG PER TABLET     Take 1 tablet by mouth every 6 (six) hours as needed.    LORATADINE (CLARITIN) 10 MG TABLET    Take 10 mg by mouth once daily at 6am.    PRAMIPEXOLE (MIRAPEX) 0.125 MG TABLET    [employer] Take No date recorded No form recorded No frequency recorded No route recorded No set duration recorded No set duration amount recorded active No dosage strength recorded No dosage strength units of measure recorded    PRAMIPEXOLE (MIRAPEX) 0.5 MG TABLET    Take 1 tablet (0.5 mg total) by mouth every evening.    ROSUVASTATIN (CRESTOR) 10 MG TABLET    Take 1 tablet (10 mg total) by mouth once daily at 6am.    SERTRALINE (ZOLOFT) 100 MG TABLET    Take 2 tablets (200 mg total) by mouth once daily.    TRAMADOL (ULTRAM) 50 MG TABLET    Take 50 mg by mouth every 6 (six) hours as needed.    VENTOLIN HFA 90 MCG/ACTUATION INHALER    Inhale 2 puffs into the lungs every 6 (six) hours as needed.     Review of patient's allergies indicates:   Allergen Reactions    Nsaids (non-steroidal anti-inflammatory drug) Nausea And Vomiting       ROS:    Body mass index is 68.78 kg/m².  GENERAL: Well appearing, appropriate for stated age, no acute distress.  CARDIOVASCULAR: Pulses regular by peripheral palpation.  PULMONARY: Respirations are even and non-labored.  NEURO: Awake, alert, and oriented x 3.  PSYCH: Mood & affect are appropriate.  HEENT: Head is normocephalic and atraumatic.    Physical Exam:    Right arm:  She is neurovascularly intact distally in the right arm.  Incision is clean dry and intact without drainage.  The very distal end of the incision has evidence of healed suture abscess.  Able make a full composite fist.  Able to flex and extend at the wrist.    Imaging:    Relevant imaging results reviewed and interpreted by me, discussed with the patient and / or family today.  X-ray of the right wrist performed today redemonstrate stable alignment of distal radius fracture with hardware in appropriate position.  Evidence of callus  formation    Assessment and Plan:    Eleanor Buckner is a 67 y.o. female seen in the office today for The encounter diagnosis was Other closed extra-articular fracture of distal end of right radius, initial encounter..  Date of surgery 07/14/2022. Overall patient doing very well now 6 weeks status post open reduction and internal fixation of right distal radius fracture.  She may self discontinue removable wrist brace over the next 2 weeks.  Educated the patient on range-of-motion exercises of like to perform over the next 6 weeks.  Avoid heavy lifting until cleared.  Return to clinic in 6 weeks for repeat clinical and radiographic evaluation.      Shmuel Medina MD  \[facility] [place] [facility]\"" Orthopedic Surgery

## 2022-08-31 NOTE — ASSESSMENT & PLAN NOTE
BMI: 68.78  TSH: 4.6  Vitamin D level: 7.1  HgbA1c: 5.2  Sleep Study: ordered  Weight Loss Encouraged  Increase Physical Activity

## 2022-08-31 NOTE — PROGRESS NOTES
Subjective:       Patient ID: Eleanor Buckner is a 67 y.o. female.    Chief Complaint: Shortness of Breath (C/O sob . Started  therapy.)    Patient has diagnosis of HLD, GERD, Osteoporosis, Anxiety, Hx of ORIF Right Distal Radius (07/14/2022). Patient seen in clinic today for continued SOB. Patient last seen in clinic on 05/31/2022. Patient currently being followed by Cardiology Clinic. NM Stress Test performed on 05/13/2022, indicated abnormal myocardial perfusion scan; there is a mild intensity, small sized, fixed perfusion abnormality consistent with scar in the apical wall(s) in the typical distribution of the LAD territory; there are no other significant perfusion abnormalities; the gated perfusion images showed an ejection fraction of 64% post stress; the patient reported no chest pain during the stress test. Other workup per cardiology was negative. Patient had PFTs completed in 2020, normal. Patient currently on Ventolin Inhaler, states does help decrease SOB. Patient also states swelling to BLE at times. BLE Arterial US negative. BLE Venous US ordered per Cardiology Clinic. Patient denies any other acute complaints at this time.     Patient followed by Cardiology Clinic. Last appointment on 08/04/2022. 67 y.o. female with a past medical history of Morbid Obesity and HLD presents for follow up and results of Nuclear Stress Test. Patient completed a Lexiscan Stress Test on 5.13.22 which was abnormal revealing a mild intensity, small sized, fixed perfusion abnormality consistent with scar in the apical wall(s) in the typical distribution of the LAD territory. Patient completed an echocardiogram in 11/2021 which revealed an EF of approximately 55%. She also completed LINDY testing in 11/2021 which revealed no evidence of significant arterial insufficiency. She completed a nuclear stress test on 9/19/2019 that revealed no ischemia and no wall motion abnormality. She completed PFTs that revealed no  abnormalities. Patient continues to endorse shortness of breath with ambulation as well as bilateral lower extremity edema. She denies chest pain, palpitations, or syncope. She states she continues to use a walker at home due to balance issues and states she was recently referred for physical therapy. She reports compliance with her Crestor. CANO - unchanged: Lexiscan Stress Test 5.13.22 - abnormal -  a mild intensity, small sized, fixed perfusion abnormality consistent with scar in the apical wall(s) in the typical distribution of the LAD territory; EF 55% per Echo Nov. 2021; LVEF -55% (TTE 7.3.19); Lexiscan stress test - negative for ischemia with no wall motion abnormalities (9.19.19); PFTs - normal; Counseled patient on the importance of low-sodium, heart healthy diet and increased activity as tolerated. HLD: LDL at goal - 84; Continue Crestor 10mg daily; Counseled on low-cholesterol, low-fat diet, and exercise as tolerated. Leg cramping/pain/edema: LINDY testing Nov. 2021 - No evidence of significant arterial insufficiency was identified on the study; Will order BLE venous reflux studies; Recommend low salt diet, leg elevation, and compression stockings if able. Patient has follow up appointment scheduled on 11/04/2022.     Patient followed by Orthopedic Clinic. Last appointment on 07/27/2022. Eleanor Buckner is a 67 y.o. female who presents for follow up treatment of closed extra-articular fracture of distal end of right radius. Patient's last visit with me was on 7/8/2022. Overall she is doing well. Denies any pain in the right wrist. Is no longer taking any pain medication. Has been compliant with splint wear and nonweightbearing. X-ray of the right wrist performed today redemonstrate stable alignment of distal radius fracture with hardware in appropriate position. Overall patient doing very well now 2 weeks status post open reduction and internal fixation of right distal radius fracture. We will  transition her to a removable Velcro brace in clinic today. Educated the patient on range-of-motion exercises of like to perform over the next 4 weeks. Continued nonweightbearing on the right upper extremity and does see her back in 4 weeks time. Patient has follow up appointment scheduled for 08/31/2022.     Patient followed by Mental Health Provider, Eleanor Anderson NP. Last appointment on 07/27/2022. Patient presents with her . Patient explains that although she is taking Zoloft 200mg q HS, she is still anxious and has low energy. She gets discouraged because she cannot do the things that she used to do. States that she used to be able to do housework but now she does not have the energy. She stopped doing housework 3 months ago. States that she has been depressed all of her life; since around 8 or 9.   Her mother and father . Her father was an alcoholic. They would argue and she would get nervous. She is still nervous. Worries about everything and every body. States that she stopped doing things 3 months ago because she has a hard time standing up. Her back gets weak. She fell and broke her arm about a month ago and had surgery two weeks ago. One month before breaking her arm, she broke her foot while climbing into the truck. She could not get her leg up high enough to step onto the step to get into the truck. The step broke. In 2012, she fell and had to have shoulder replacement. States that she has been taking Zoloft for about 10 years and it has worked well for her in the past. Has not had a sleep study. States that she does snore at night. Naps off and on during the day. Does not feel that she could keep a mask on. Requested PCP consider a sleep study. Patient admits that she sits in the house a lot because she fears that she is going to fall. Both times that she fell, she reports that she had difficulty lifting her R leg. She fears that she is going to fall again. Explained that the more  she sits, the weaker she will become. Strongly encouraged that she requests to go to PT to gain strength and confidence. Will continue the Zoloft 200mg q HS. Add Wellbutrin XL 150mg q day. FU in 3 weeks      Mammogram: 04/30/2021, negative ordered  Pap: deferred due to age  Colonoscopy: 01/13/2020, recommend repeat in 10 years  Bone Density: 04/30/2021, osteoporosis  Medicare Wellness: ordered    Review of Systems   Constitutional: Negative.  Negative for activity change and unexpected weight change.   HENT: Negative.  Negative for hearing loss, rhinorrhea and trouble swallowing.    Eyes: Negative.  Negative for discharge and visual disturbance.   Respiratory: Negative.  Negative for chest tightness and wheezing.    Cardiovascular: Negative.  Negative for chest pain and palpitations.   Gastrointestinal: Negative.  Negative for blood in stool, constipation, diarrhea and vomiting.   Endocrine: Negative.  Negative for polydipsia and polyuria.   Genitourinary: Negative.  Negative for difficulty urinating, dysuria, hematuria and menstrual problem.   Musculoskeletal: Negative.  Negative for arthralgias, joint swelling and neck pain.   Integumentary:  Negative.   Allergic/Immunologic: Negative.    Neurological: Negative.  Negative for weakness and headaches.   Hematological: Negative.    Psychiatric/Behavioral: Negative.  Negative for confusion and dysphoric mood.        Objective:      Physical Exam  Vitals reviewed.   Constitutional:       Appearance: Normal appearance. She is obese.   HENT:      Head: Normocephalic and atraumatic.      Mouth/Throat:      Mouth: Mucous membranes are moist.      Pharynx: Oropharynx is clear.   Eyes:      Extraocular Movements: Extraocular movements intact.      Conjunctiva/sclera: Conjunctivae normal.      Pupils: Pupils are equal, round, and reactive to light.   Cardiovascular:      Rate and Rhythm: Normal rate and regular rhythm.      Heart sounds: Normal heart sounds.   Pulmonary:       Effort: Pulmonary effort is normal.      Breath sounds: Normal breath sounds.   Abdominal:      General: Bowel sounds are normal.   Musculoskeletal:         General: Normal range of motion.      Cervical back: Normal range of motion.   Skin:     General: Skin is warm and dry.   Neurological:      Mental Status: She is alert and oriented to person, place, and time.   Psychiatric:         Mood and Affect: Mood normal.         Behavior: Behavior normal.       Assessment:       Problem List Items Addressed This Visit          Cardiac/Vascular    Dyspnea on exertion - Primary     CT Chest ordered due to continued SOB.   O2 Sat: 97% on RA  CO2: 29           Relevant Orders    CT Chest W Wo Contrast    Creatinine, serum (Completed)       Endocrine    Class 3 severe obesity due to excess calories with serious comorbidity and body mass index (BMI) of 60.0 to 69.9 in adult (Chronic)     BMI: 68.78  TSH: 4.6  Vitamin D level: 7.1  HgbA1c: 5.2  Sleep Study: ordered  Weight Loss Encouraged  Increase Physical Activity         Vitamin D deficiency (Chronic)     Vitamin D level: 7.1  Continue Vitamin D 50,000 units weekly            Other    Peripheral edema     Start Lasix 10 mg daily prn edema  Monitor B/P at home         Sleep apnea     Sleep study ordered         Relevant Orders    Ambulatory referral/consult to Sleep Disorders     Other Visit Diagnoses       Encounter for screening mammogram for malignant neoplasm of breast        Relevant Orders    Mammo Digital Screening Bilat w/ Jose            Plan:    Patient to follow up in 4 months with labs to be done prior to appointment to reassess Vitamin D level, SOB/CT chest.

## 2022-09-01 ENCOUNTER — CLINICAL SUPPORT (OUTPATIENT)
Dept: REHABILITATION | Facility: HOSPITAL | Age: 67
End: 2022-09-01
Payer: MEDICARE

## 2022-09-01 DIAGNOSIS — R29.6 MULTIPLE FALLS: ICD-10-CM

## 2022-09-01 DIAGNOSIS — G89.29 BILATERAL CHRONIC KNEE PAIN: ICD-10-CM

## 2022-09-01 DIAGNOSIS — M25.561 BILATERAL CHRONIC KNEE PAIN: ICD-10-CM

## 2022-09-01 DIAGNOSIS — M62.81 MUSCLE WEAKNESS (GENERALIZED): Primary | ICD-10-CM

## 2022-09-01 DIAGNOSIS — G47.33 OSA (OBSTRUCTIVE SLEEP APNEA): Primary | ICD-10-CM

## 2022-09-01 DIAGNOSIS — R26.81 UNSTEADINESS ON FEET: ICD-10-CM

## 2022-09-01 DIAGNOSIS — M25.562 BILATERAL CHRONIC KNEE PAIN: ICD-10-CM

## 2022-09-01 PROCEDURE — 97110 THERAPEUTIC EXERCISES: CPT

## 2022-09-01 NOTE — PROGRESS NOTES
[facility]   Physical Therapy Treatment Note     Name: Eleanor Buckner  Clinic Number: [id number]    Therapy Diagnosis:   Encounter Diagnoses   Name Primary?    Muscle weakness (generalized) Yes    Bilateral chronic knee pain     Unsteadiness on feet     Multiple falls      Physician: Emery Morel MD    Authorization Period Expiration: 10/7/2022  Plan of Care Expiration: 10/7/2022  Reassessment / POC Due: 9/23/2022  Visit # / Visits authorized: 2/ 8     Visit Date: 9/1/2022    Time In: 11:00  Time Out: 11:30  Total Billable Time: 30 minutes    SUBJECTIVE     Pt reports: no pain  She was compliant with some of home exercise program.  Response to previous treatment: n/a  Functional change: n/a    Pain: 0/10  Location: no pain     OBJECTIVE     Objective Measures updated at progress report unless specified.     Treatment     Eleanor received the treatments listed below:      therapeutic exercises to develop strength and endurance for 30 minutes including:     neuromuscular re-education activities to improve: Balance and Proprioception for 0 minutes. The following activities were included:     Therapeutic Exercise Grid     Exercise 1  Exercise 2  Exercise 3  Exercise 4    Exercise :    B heel slides, SLR, sidelying hip abduction LAQ, hip flexion, knee flexion with TB Sit to stands Standing: heel raises, hip flexion, hip abduction, mini-squats   Repetition/Time :    10   10   10   10     Resist or Assist :                  Comment :          Elevated mat   heel raises and abduction only    Done :    yes  yes    yes   yes                     Exercise 5  Exercise 6  Exercise 7  Exercise 8    Exercise :    Step-up   Foam: EO, EC NuStep      Repetition/Time :          5 min        Resist or Assist :          L4        Comment :          LE only        Done :          yes                         Exercise 9  Exercise 10  Exercise 11  Exercise 12    Exercise :                   Repetition/Time :                  Resist or Assist :                  Comment :                  Done :                                  Patient Education and Home Exercises     Home Exercises Provided and Patient Education Provided     Education provided: Educated pt on importance of HEP and encouraged pt to continue daily    ASSESSMENT     Pt tolerated initiation of LE strengthening exercises with good effort with no c/o pain with cues required for proper technique to isolate specific muscles. Discussed importance of increasing daily activities and importance of HEP daily    Eleanor Is progressing well towards her goals.   Pt prognosis is Fair.     Pt will continue to benefit from skilled outpatient physical therapy to address the deficits listed in the problem list box on initial evaluation, provide pt/family education and to maximize pt's level of independence in the home and community environment.     Pt's spiritual, cultural and educational needs considered and pt agreeable to plan of care and goals.     Anticipated barriers to physical therapy: obesity, SOB with minimal activity, multiple falls     Goals:  Short Term Goals: 4 weeks      Pt will demonstrate knowledge and independence with HEP to continue with exercises outside of therapy     Reduce c/o pain in B knees to < 2/10 pain level with daily activities     Improve strength in B LE to 4-/5 MMT throughout in order to improve tolerance with overall functional mobility     Long Term Goals: 6 weeks      Improve confidence with functional mobility in and outside of home as evidence by improved      Improve strength in B LE to 4/5 MMT throughout in order to improve tolerance with overall functional mobility     Pt will improve distance and stability with amb using rollator RW performed modified Independent >300 ft and improved TUG score to < 15 secs to improve community amb     Pt will reduce risk for falls and improve ability and safety with transfers as  evidence by improved 5x sit to stands to < 20 secs     Pt will improve balance and reduce risk for falls as evidence by improved MCTSIB to 3/4 (30,30,30,15)    PLAN     Continue with current Plan of Care and progress per pt's tolerance    Massiel Regalado, PT

## 2022-09-02 ENCOUNTER — CLINICAL SUPPORT (OUTPATIENT)
Dept: REHABILITATION | Facility: HOSPITAL | Age: 67
End: 2022-09-02
Payer: MEDICARE

## 2022-09-02 DIAGNOSIS — M62.81 MUSCLE WEAKNESS (GENERALIZED): Primary | ICD-10-CM

## 2022-09-02 DIAGNOSIS — R29.6 MULTIPLE FALLS: ICD-10-CM

## 2022-09-02 DIAGNOSIS — M25.562 BILATERAL CHRONIC KNEE PAIN: ICD-10-CM

## 2022-09-02 DIAGNOSIS — R26.81 UNSTEADINESS ON FEET: ICD-10-CM

## 2022-09-02 DIAGNOSIS — M25.561 BILATERAL CHRONIC KNEE PAIN: ICD-10-CM

## 2022-09-02 DIAGNOSIS — G89.29 BILATERAL CHRONIC KNEE PAIN: ICD-10-CM

## 2022-09-02 PROCEDURE — 97110 THERAPEUTIC EXERCISES: CPT

## 2022-09-02 NOTE — PROGRESS NOTES
[facility]   Physical Therapy Treatment Note     Name: Eleanor Peacearmando  [facility] Number: [id number]    Therapy Diagnosis:   Encounter Diagnoses   Name Primary?    Muscle weakness (generalized) Yes    Bilateral chronic knee pain     Unsteadiness on feet     Multiple falls        Physician: Emery Morel MD    Authorization Period Expiration: 10/7/2022  Plan of Care Expiration: 10/7/2022  Reassessment / POC Due: 9/23/2022  Visit # / Visits authorized: 3/ 18     Visit Date: 9/2/2022    Time In: 10:30  Time Out: 11:10  Total Billable Time: 40 minutes    SUBJECTIVE     Pt reports: felt good after therapy yesterday. Saw MD and she started a fluid pill yesterday  She was compliant with some of home exercise program.  Response to previous treatment: good, increased SOB with activity  Functional change: none    Pain: 0/10  Location: no pain     OBJECTIVE     Objective Measures updated at progress report unless specified.     Treatment     Eleanor received the treatments listed below:      therapeutic exercises to develop strength and endurance for 40 minutes including:     neuromuscular re-education activities to improve: Balance and Proprioception for 0 minutes. The following activities were included:     Therapeutic Exercise Grid     Exercise 1  Exercise 2  Exercise 3  Exercise 4    Exercise :    B heel slides, SLR, sidelying hip abduction LAQ, hip flexion, knee flexion with TB Sit to stands Standing: heel raises, hip flexion, hip abduction, mini-squats   Repetition/Time :    10   10   10   10     Resist or Assist :       Red TB           Comment :          Lower mat      Done :    yes  yes    yes   yes                     Exercise 5  Exercise 6  Exercise 7  Exercise 8    Exercise :    Step-up   Foam: EO, EC NuStep      Repetition/Time :    10      5 min        Resist or Assist :          L4        Comment :    4 inch box      LE only        Done :    yes      yes                          Exercise 9  Exercise 10  Exercise 11  Exercise 12    Exercise :                  Repetition/Time :                  Resist or Assist :                  Comment :                  Done :                                  Patient Education and Home Exercises     Home Exercises Provided and Patient Education Provided     Education provided: Educated pt on importance of HEP and encouraged pt to continue daily    ASSESSMENT     Pt performed all LE strengthening exercises with good effort with increased SOB requiring frequent rest breaks. Educated pt on importance of performing HEP daily outside of therapy as well as performing activities that challenge her to facilitate improvements.     Eleanor Is progressing well towards her goals.   Pt prognosis is Fair.     Pt will continue to benefit from skilled outpatient physical therapy to address the deficits listed in the problem list box on initial evaluation, provide pt/family education and to maximize pt's level of independence in the home and community environment.     Pt's spiritual, cultural and educational needs considered and pt agreeable to plan of care and goals.     Anticipated barriers to physical therapy: obesity, SOB with minimal activity, multiple falls     Goals:  Short Term Goals: 4 weeks      Pt will demonstrate knowledge and independence with HEP to continue with exercises outside of therapy     Reduce c/o pain in B knees to < 2/10 pain level with daily activities     Improve strength in B LE to 4-/5 MMT throughout in order to improve tolerance with overall functional mobility     Long Term Goals: 6 weeks      Improve confidence with functional mobility in and outside of home as evidence by improved      Improve strength in B LE to 4/5 MMT throughout in order to improve tolerance with overall functional mobility     Pt will improve distance and stability with amb using rollator RW performed modified Independent >300 ft and improved TUG score to < 15 secs to  improve community amb     Pt will reduce risk for falls and improve ability and safety with transfers as evidence by improved 5x sit to stands to < 20 secs     Pt will improve balance and reduce risk for falls as evidence by improved MCTSIB to 3/4 (30,30,30,15)    PLAN     Continue with current Plan of Care and progress per pt's tolerance    Massiel Regalado, PT

## 2022-09-07 ENCOUNTER — CLINICAL SUPPORT (OUTPATIENT)
Dept: REHABILITATION | Facility: HOSPITAL | Age: 67
End: 2022-09-07
Payer: MEDICARE

## 2022-09-07 DIAGNOSIS — G89.29 BILATERAL CHRONIC KNEE PAIN: ICD-10-CM

## 2022-09-07 DIAGNOSIS — R29.6 MULTIPLE FALLS: ICD-10-CM

## 2022-09-07 DIAGNOSIS — R26.81 UNSTEADINESS ON FEET: ICD-10-CM

## 2022-09-07 DIAGNOSIS — M25.561 BILATERAL CHRONIC KNEE PAIN: ICD-10-CM

## 2022-09-07 DIAGNOSIS — M25.562 BILATERAL CHRONIC KNEE PAIN: ICD-10-CM

## 2022-09-07 DIAGNOSIS — M62.81 MUSCLE WEAKNESS (GENERALIZED): Primary | ICD-10-CM

## 2022-09-07 PROCEDURE — 97110 THERAPEUTIC EXERCISES: CPT | Mod: CQ

## 2022-09-07 NOTE — PROGRESS NOTES
[facility]   Physical Therapy Treatment Note     Name: Eleanor Buckner  Clinic Number: [id number]    Therapy Diagnosis:   Encounter Diagnoses   Name Primary?    Muscle weakness (generalized) Yes    Bilateral chronic knee pain     Unsteadiness on feet     Multiple falls          Physician: Emery Morel MD    Authorization Period Expiration: 10/7/2022  Plan of Care Expiration: 10/7/2022  Reassessment / POC Due: 9/23/2022  Visit # / Visits authorized: 4/ 18     PTA Visit: 1/5    Visit Date: 9/7/2022    Time In: 10:30  Time Out: 1101  Total Billable Time: 31 minutes    SUBJECTIVE     Pt reports: she has no pain today, and has been feeling ok after session, she just gets tired and sometimes short of breath.  She was compliant with some of home exercise program.  Response to previous treatment: good, increased SOB with activity  Functional change: none    Pain: 0/10  Location: no pain     OBJECTIVE     Objective Measures updated at progress report unless specified.     Treatment     Eleanor received the treatments listed below:      therapeutic exercises to develop strength and endurance for 31 minutes including:     neuromuscular re-education activities to improve: Balance and Proprioception for 0 minutes. The following activities were included:     Therapeutic Exercise Grid     Exercise 1  Exercise 2  Exercise 3  Exercise 4    Exercise :    B heel slides, SLR, sidelying hip abduction LAQ, hip flexion, knee flexion with TB Sit to stands Standing: heel raises, hip flexion, hip abduction, mini-squats   Repetition/Time :    10   10   10   10     Resist or Assist :       Red TB           Comment :          Lower mat      Done :    yes  yes    yes   yes                     Exercise 5  Exercise 6  Exercise 7  Exercise 8    Exercise :    Step-up   Foam: EO, EC NuStep      Repetition/Time :    10      5 min        Resist or Assist :          L4        Comment :    4 inch box      LE only         Done :    yes      yes                         Exercise 9  Exercise 10  Exercise 11  Exercise 12    Exercise :                  Repetition/Time :                  Resist or Assist :                  Comment :                  Done :                                  Patient Education and Home Exercises     Home Exercises Provided and Patient Education Provided     Education provided: Educated pt on importance of HEP and encouraged pt to continue daily    ASSESSMENT     Pt needed rest breaks only after sit to stands, and standing LE exercises. All exercises completed with good effort. Occasional verbal cues required for form.     Eleanor Is progressing well towards her goals.   Pt prognosis is Fair.     Pt will continue to benefit from skilled outpatient physical therapy to address the deficits listed in the problem list box on initial evaluation, provide pt/family education and to maximize pt's level of independence in the home and community environment.     Pt's spiritual, cultural and educational needs considered and pt agreeable to plan of care and goals.     Anticipated barriers to physical therapy: obesity, SOB with minimal activity, multiple falls     Goals:  Short Term Goals: 4 weeks      Pt will demonstrate knowledge and independence with HEP to continue with exercises outside of therapy     Reduce c/o pain in B knees to < 2/10 pain level with daily activities     Improve strength in B LE to 4-/5 MMT throughout in order to improve tolerance with overall functional mobility     Long Term Goals: 6 weeks      Improve confidence with functional mobility in and outside of home as evidence by improved      Improve strength in B LE to 4/5 MMT throughout in order to improve tolerance with overall functional mobility     Pt will improve distance and stability with amb using rollator RW performed modified Independent >300 ft and improved TUG score to < 15 secs to improve community amb     Pt will reduce risk for  falls and improve ability and safety with transfers as evidence by improved 5x sit to stands to < 20 secs     Pt will improve balance and reduce risk for falls as evidence by improved MCTSIB to 3/4 (30,30,30,15)    PLAN     Continue with current Plan of Care and progress per pt's tolerance    Nguyễn Elizondo, PTA

## 2022-09-08 ENCOUNTER — CLINICAL SUPPORT (OUTPATIENT)
Dept: REHABILITATION | Facility: HOSPITAL | Age: 67
End: 2022-09-08
Payer: MEDICARE

## 2022-09-08 DIAGNOSIS — R26.81 UNSTEADINESS ON FEET: ICD-10-CM

## 2022-09-08 DIAGNOSIS — R29.6 MULTIPLE FALLS: ICD-10-CM

## 2022-09-08 DIAGNOSIS — M62.81 MUSCLE WEAKNESS (GENERALIZED): Primary | ICD-10-CM

## 2022-09-08 DIAGNOSIS — M25.562 BILATERAL CHRONIC KNEE PAIN: ICD-10-CM

## 2022-09-08 DIAGNOSIS — M25.561 BILATERAL CHRONIC KNEE PAIN: ICD-10-CM

## 2022-09-08 DIAGNOSIS — G89.29 BILATERAL CHRONIC KNEE PAIN: ICD-10-CM

## 2022-09-08 PROCEDURE — 97110 THERAPEUTIC EXERCISES: CPT | Mod: CQ

## 2022-09-08 NOTE — PROGRESS NOTES
[facility]   Physical Therapy Treatment Note     Name: Eleanor Buckner  Clinic Number: [id number]    Therapy Diagnosis:   Encounter Diagnoses   Name Primary?    Muscle weakness (generalized) Yes    Bilateral chronic knee pain     Unsteadiness on feet     Multiple falls            Physician: Emery Morel MD    Authorization Period Expiration: 10/7/2022  Plan of Care Expiration: 10/7/2022  Reassessment / POC Due: 9/23/2022  Visit # / Visits authorized: 5/ 18     PTA Visit: 2/5    Visit Date: 9/8/2022    Time In: 10:30  Time Out: 1108  Total Billable Time: 38 minutes    SUBJECTIVE     Pt reports: Her L knee feels weak and she is tired, but no real pain.   She was compliant with some of home exercise program.  Response to previous treatment: good, increased SOB with activity  Functional change: none    Pain: 0/10  Location: no pain     OBJECTIVE     Objective Measures updated at progress report unless specified.     Treatment     Eleanor received the treatments listed below:      therapeutic exercises to develop strength and endurance for 38 minutes including:     neuromuscular re-education activities to improve: Balance and Proprioception for 0 minutes. The following activities were included:     Therapeutic Exercise Grid     Exercise 1  Exercise 2  Exercise 3  Exercise 4    Exercise :    B heel slides, SLR, sidelying hip abduction LAQ, hip flexion, knee flexion with TB Sit to stands Standing: heel raises, hip flexion, hip abduction, mini-squats   Repetition/Time :    15,12   15  10   10     Resist or Assist :       Red TB           Comment :          Lower mat      Done :    yes  yes    yes   yes                     Exercise 5  Exercise 6  Exercise 7  Exercise 8    Exercise :    Step-up   Foam: EO, EC NuStep      Repetition/Time :    10      5 min        Resist or Assist :          L4        Comment :    4 inch box      LE only        Done :    yes      yes                          Exercise 9  Exercise 10  Exercise 11  Exercise 12    Exercise :                  Repetition/Time :                  Resist or Assist :                  Comment :                  Done :                                  Patient Education and Home Exercises     Home Exercises Provided and Patient Education Provided     Education provided: Educated pt on importance of HEP and encouraged pt to continue daily    ASSESSMENT     Pt needed rest breaks only after sit to stands, and standing LE exercises. Increased reps of some mat exercises as noted to 15 with good tolerance.     Eleanor Is progressing well towards her goals.   Pt prognosis is Fair.     Pt will continue to benefit from skilled outpatient physical therapy to address the deficits listed in the problem list box on initial evaluation, provide pt/family education and to maximize pt's level of independence in the home and community environment.     Pt's spiritual, cultural and educational needs considered and pt agreeable to plan of care and goals.     Anticipated barriers to physical therapy: obesity, SOB with minimal activity, multiple falls     Goals:  Short Term Goals: 4 weeks      Pt will demonstrate knowledge and independence with HEP to continue with exercises outside of therapy     Reduce c/o pain in B knees to < 2/10 pain level with daily activities     Improve strength in B LE to 4-/5 MMT throughout in order to improve tolerance with overall functional mobility     Long Term Goals: 6 weeks      Improve confidence with functional mobility in and outside of home as evidence by improved      Improve strength in B LE to 4/5 MMT throughout in order to improve tolerance with overall functional mobility     Pt will improve distance and stability with amb using rollator RW performed modified Independent >300 ft and improved TUG score to < 15 secs to improve community amb     Pt will reduce risk for falls and improve ability and safety with transfers as evidence  by improved 5x sit to stands to < 20 secs     Pt will improve balance and reduce risk for falls as evidence by improved MCTSIB to 3/4 (30,30,30,15)    PLAN     Continue with current Plan of Care and progress per pt's tolerance    Nguyễn Elizondo, PTA

## 2022-09-09 ENCOUNTER — DOCUMENTATION ONLY (OUTPATIENT)
Dept: ADMINISTRATIVE | Facility: HOSPITAL | Age: 67
End: 2022-09-09
Payer: MEDICARE

## 2022-09-09 DIAGNOSIS — G47.33 OSA (OBSTRUCTIVE SLEEP APNEA): Primary | ICD-10-CM

## 2022-09-12 ENCOUNTER — PROCEDURE VISIT (OUTPATIENT)
Dept: SLEEP MEDICINE | Facility: HOSPITAL | Age: 67
End: 2022-09-12
Attending: NURSE PRACTITIONER
Payer: MEDICARE

## 2022-09-12 ENCOUNTER — HOSPITAL ENCOUNTER (OUTPATIENT)
Dept: RADIOLOGY | Facility: HOSPITAL | Age: 67
Discharge: HOME | End: 2022-09-12
Attending: NURSE PRACTITIONER
Payer: MEDICARE

## 2022-09-12 DIAGNOSIS — Z12.31 ENCOUNTER FOR SCREENING MAMMOGRAM FOR MALIGNANT NEOPLASM OF BREAST: ICD-10-CM

## 2022-09-12 DIAGNOSIS — G47.33 OSA (OBSTRUCTIVE SLEEP APNEA): ICD-10-CM

## 2022-09-12 DIAGNOSIS — R06.09 DYSPNEA ON EXERTION: ICD-10-CM

## 2022-09-12 LAB
CREAT SERPL-MCNC: 0.9 MG/DL (ref 0.5–1.4)
SAMPLE: NORMAL

## 2022-09-12 PROCEDURE — 77067 SCR MAMMO BI INCL CAD: CPT | Mod: 26,,, | Performed by: RADIOLOGY

## 2022-09-12 PROCEDURE — 95811 POLYSOM 6/>YRS CPAP 4/> PARM: CPT

## 2022-09-12 PROCEDURE — 77063 BREAST TOMOSYNTHESIS BI: CPT | Mod: 26,,, | Performed by: RADIOLOGY

## 2022-09-12 PROCEDURE — 77067 SCR MAMMO BI INCL CAD: CPT | Mod: TC

## 2022-09-12 PROCEDURE — 25500020 PHARM REV CODE 255: Performed by: NURSE PRACTITIONER

## 2022-09-12 PROCEDURE — 77067 MAMMO DIGITAL SCREENING BILAT WITH TOMO: ICD-10-PCS | Mod: 26,,, | Performed by: RADIOLOGY

## 2022-09-12 PROCEDURE — 77063 MAMMO DIGITAL SCREENING BILAT WITH TOMO: ICD-10-PCS | Mod: 26,,, | Performed by: RADIOLOGY

## 2022-09-12 PROCEDURE — 71260 CT THORAX DX C+: CPT | Mod: TC

## 2022-09-12 RX ADMIN — IOPAMIDOL 100 ML: 755 INJECTION, SOLUTION INTRAVENOUS at 10:09

## 2022-09-14 ENCOUNTER — CLINICAL SUPPORT (OUTPATIENT)
Dept: REHABILITATION | Facility: HOSPITAL | Age: 67
End: 2022-09-14
Payer: MEDICARE

## 2022-09-14 DIAGNOSIS — M62.81 MUSCLE WEAKNESS (GENERALIZED): Primary | ICD-10-CM

## 2022-09-14 DIAGNOSIS — G89.29 BILATERAL CHRONIC KNEE PAIN: ICD-10-CM

## 2022-09-14 DIAGNOSIS — R26.81 UNSTEADINESS ON FEET: ICD-10-CM

## 2022-09-14 DIAGNOSIS — M25.561 BILATERAL CHRONIC KNEE PAIN: ICD-10-CM

## 2022-09-14 DIAGNOSIS — M25.562 BILATERAL CHRONIC KNEE PAIN: ICD-10-CM

## 2022-09-14 DIAGNOSIS — R29.6 MULTIPLE FALLS: ICD-10-CM

## 2022-09-14 PROCEDURE — 97110 THERAPEUTIC EXERCISES: CPT | Mod: CQ

## 2022-09-14 NOTE — PROGRESS NOTES
[facility]   Physical Therapy Treatment Note     Name: Eleanor Buckner  Clinic Number: [id number]    Therapy Diagnosis:   Encounter Diagnoses   Name Primary?    Muscle weakness (generalized) Yes    Bilateral chronic knee pain     Unsteadiness on feet     Multiple falls              Physician: Emery Morel MD    Authorization Period Expiration: 10/7/2022  Plan of Care Expiration: 10/7/2022  Reassessment / POC Due: 9/23/2022  Visit # / Visits authorized: 6/ 18     PTA Visit: 3/5    Visit Date: 9/14/2022    Time In: 1000  Time Out: 1043  Total Billable Time: 43 minutes    SUBJECTIVE     Pt reports: Her legs are sore and feel weak after doing SL hip abduction last session, and remain sore today. No pain at rest, but 7/10 when walking.    She was compliant with some of home exercise program.  Response to previous treatment: good, increased SOB with activity  Functional change: none    Pain: 7/10  Location: no pain     OBJECTIVE     Objective Measures updated at progress report unless specified.     Treatment     Eleanor received the treatments listed below:      therapeutic exercises to develop strength and endurance for 43 minutes including:     neuromuscular re-education activities to improve: Balance and Proprioception for 0 minutes. The following activities were included:     Therapeutic Exercise Grid     Exercise 1  Exercise 2  Exercise 3  Exercise 4    Exercise :    B heel slides, SLR, supine hip abduction LAQ, hip flexion, knee flexion with TB Sit to stands Standing: heel raises, hip flexion, hip abduction, mini-squats   Repetition/Time :    15  15  10   10     Resist or Assist :       Red TB           Comment :          Lower mat      Done :    yes  yes    yes   yes                     Exercise 5  Exercise 6  Exercise 7  Exercise 8    Exercise :    Step-up   Foam: EO, EC NuStep      Repetition/Time :    10      5 min        Resist or Assist :          L4        Comment :    4  inch box      LE only        Done :    yes      yes                         Exercise 9  Exercise 10  Exercise 11  Exercise 12    Exercise :                  Repetition/Time :                  Resist or Assist :                  Comment :                  Done :                                  Patient Education and Home Exercises     Home Exercises Provided and Patient Education Provided     Education provided: Educated pt on importance of HEP and encouraged pt to continue daily    ASSESSMENT     Performed hip abduction in supine due to increased pain from doing them in side-lying last session with better tolerance. She had no issue with other exercises aside from fatigue.    Eleanor Is progressing well towards her goals.   Pt prognosis is Fair.     Pt will continue to benefit from skilled outpatient physical therapy to address the deficits listed in the problem list box on initial evaluation, provide pt/family education and to maximize pt's level of independence in the home and community environment.     Pt's spiritual, cultural and educational needs considered and pt agreeable to plan of care and goals.     Anticipated barriers to physical therapy: obesity, SOB with minimal activity, multiple falls     Goals:  Short Term Goals: 4 weeks      Pt will demonstrate knowledge and independence with HEP to continue with exercises outside of therapy     Reduce c/o pain in B knees to < 2/10 pain level with daily activities     Improve strength in B LE to 4-/5 MMT throughout in order to improve tolerance with overall functional mobility     Long Term Goals: 6 weeks      Improve confidence with functional mobility in and outside of home as evidence by improved      Improve strength in B LE to 4/5 MMT throughout in order to improve tolerance with overall functional mobility     Pt will improve distance and stability with amb using rollator RW performed modified Independent >300 ft and improved TUG score to < 15 secs to  improve community amb     Pt will reduce risk for falls and improve ability and safety with transfers as evidence by improved 5x sit to stands to < 20 secs     Pt will improve balance and reduce risk for falls as evidence by improved MCTSIB to 3/4 (30,30,30,15)    PLAN     Continue with current Plan of Care and progress per pt's tolerance    Nguyễn Elizondo, PTA

## 2022-09-15 ENCOUNTER — CLINICAL SUPPORT (OUTPATIENT)
Dept: REHABILITATION | Facility: HOSPITAL | Age: 67
End: 2022-09-15
Payer: MEDICARE

## 2022-09-15 DIAGNOSIS — M62.81 MUSCLE WEAKNESS (GENERALIZED): Primary | ICD-10-CM

## 2022-09-15 DIAGNOSIS — R26.81 UNSTEADINESS ON FEET: ICD-10-CM

## 2022-09-15 DIAGNOSIS — M25.561 BILATERAL CHRONIC KNEE PAIN: ICD-10-CM

## 2022-09-15 DIAGNOSIS — R29.6 MULTIPLE FALLS: ICD-10-CM

## 2022-09-15 DIAGNOSIS — M25.562 BILATERAL CHRONIC KNEE PAIN: ICD-10-CM

## 2022-09-15 DIAGNOSIS — G89.29 BILATERAL CHRONIC KNEE PAIN: ICD-10-CM

## 2022-09-15 PROCEDURE — 97110 THERAPEUTIC EXERCISES: CPT

## 2022-09-15 NOTE — PROGRESS NOTES
[facility]   Physical Therapy Treatment Note     Name: Eleanor Buckner  Clinic Number: [id number]    Therapy Diagnosis:   Encounter Diagnoses   Name Primary?    Muscle weakness (generalized) Yes    Bilateral chronic knee pain     Unsteadiness on feet     Multiple falls              Physician: Emery Morel MD    Authorization Period Expiration: 10/7/2022  Plan of Care Expiration: 10/7/2022  Reassessment / POC Due: 9/23/2022  Visit # / Visits authorized: 7/ 18     PTA Visit: 3/5    Visit Date: 9/15/2022    Time In: 1130  Time Out: 1200  Total Billable Time: 30 minutes    SUBJECTIVE     Pt reports: mild soreness in KAUSHIK FARAH's following yesterdays treatment session. Describes as a tired feeling   She was compliant with some of home exercise program.  Response to previous treatment: good,  Functional change: none    Pain: 0/10  Location: no pain     OBJECTIVE     Objective Measures updated at progress report unless specified.     Treatment     Eleanor received the treatments listed below:      therapeutic exercises to develop strength and endurance for 30 minutes including:     neuromuscular re-education activities to improve: Balance and Proprioception for 0 minutes. The following activities were included:     Therapeutic Exercise Grid     Exercise 1  Exercise 2  Exercise 3  Exercise 4    Exercise :    B heel slides, SLR, supine hip abduction LAQ, hip flexion, knee flexion with TB Sit to stands Standing: heel raises, hip flexion, hip abduction, mini-squats   Repetition/Time :    15  15  10   10     Resist or Assist :       Red TB           Comment :    No supine hip abd      Lower mat  No mini squats    Done :    yes  yes    yes   yes                     Exercise 5  Exercise 6  Exercise 7  Exercise 8    Exercise :    Step-up   Foam: EO, EC NuStep      Repetition/Time :    10   2 x 30 sec   5 min        Resist or Assist :          L4        Comment :    4 inch box   Single leg stand with  support    LE only        Done :    yes   Yes    yes                         Exercise 9  Exercise 10  Exercise 11  Exercise 12    Exercise :                  Repetition/Time :                  Resist or Assist :                  Comment :                  Done :                                  Patient Education and Home Exercises     Home Exercises Provided and Patient Education Provided     Education provided: Educated pt on importance of HEP and encouraged pt to continue daily    ASSESSMENT     Patient tolerated treatment well today. Advanced exercises from supine to standing  and single leg stand on balance pad.     Eleanor Is progressing well towards her goals.   Pt prognosis is Fair.     Pt will continue to benefit from skilled outpatient physical therapy to address the deficits listed in the problem list box on initial evaluation, provide pt/family education and to maximize pt's level of independence in the home and community environment.     Pt's spiritual, cultural and educational needs considered and pt agreeable to plan of care and goals.     Anticipated barriers to physical therapy: obesity, SOB with minimal activity, multiple falls     Goals:  Short Term Goals: 4 weeks      Pt will demonstrate knowledge and independence with HEP to continue with exercises outside of therapy     Reduce c/o pain in B knees to < 2/10 pain level with daily activities     Improve strength in B LE to 4-/5 MMT throughout in order to improve tolerance with overall functional mobility     Long Term Goals: 6 weeks      Improve confidence with functional mobility in and outside of home as evidence by improved      Improve strength in B LE to 4/5 MMT throughout in order to improve tolerance with overall functional mobility     Pt will improve distance and stability with amb using rollator RW performed modified Independent >300 ft and improved TUG score to < 15 secs to improve community amb     Pt will reduce risk for falls and  improve ability and safety with transfers as evidence by improved 5x sit to stands to < 20 secs     Pt will improve balance and reduce risk for falls as evidence by improved MCTSIB to 3/4 (30,30,30,15)    PLAN     Continue with current Plan of Care and progress per pt's tolerance    Saul Bhatti, PT

## 2022-09-16 ENCOUNTER — CLINICAL SUPPORT (OUTPATIENT)
Dept: REHABILITATION | Facility: HOSPITAL | Age: 67
End: 2022-09-16
Payer: MEDICARE

## 2022-09-16 DIAGNOSIS — G89.29 BILATERAL CHRONIC KNEE PAIN: ICD-10-CM

## 2022-09-16 DIAGNOSIS — R29.6 MULTIPLE FALLS: ICD-10-CM

## 2022-09-16 DIAGNOSIS — M25.562 BILATERAL CHRONIC KNEE PAIN: ICD-10-CM

## 2022-09-16 DIAGNOSIS — M25.561 BILATERAL CHRONIC KNEE PAIN: ICD-10-CM

## 2022-09-16 DIAGNOSIS — R26.81 UNSTEADINESS ON FEET: ICD-10-CM

## 2022-09-16 DIAGNOSIS — M62.81 MUSCLE WEAKNESS (GENERALIZED): Primary | ICD-10-CM

## 2022-09-16 PROCEDURE — 97110 THERAPEUTIC EXERCISES: CPT | Mod: CQ

## 2022-09-16 NOTE — PROGRESS NOTES
"[facility]   Physical Therapy Treatment Note     Name: Eleanor Buckner  Clinic Number: [id number]    Therapy Diagnosis:   Encounter Diagnoses   Name Primary?    Muscle weakness (generalized) Yes    Bilateral chronic knee pain     Unsteadiness on feet     Multiple falls                Physician: Emery Morel MD    Authorization Period Expiration: 10/7/2022  Plan of Care Expiration: 10/7/2022  Reassessment / POC Due: 9/23/2022  Visit # / Visits authorized: 8/ 18     PTA Visit: 1/5    Visit Date: 9/16/2022    Time In: 1030   Time Out: 1100  Total Billable Time: 30 minutes    SUBJECTIVE     Pt reports: Continues with LE "tiredness" but no pain today.  She was compliant with some of home exercise program.  Response to previous treatment: good,  Functional change: none    Pain: 0/10  Location: no pain     OBJECTIVE     Objective Measures updated at progress report unless specified.     Treatment     Eleanor received the treatments listed below:      therapeutic exercises to develop strength and endurance for 30 minutes including:     neuromuscular re-education activities to improve: Balance and Proprioception for 0 minutes. The following activities were included:     Therapeutic Exercise Grid     Exercise 1  Exercise 2  Exercise 3  Exercise 4    Exercise :    B heel slides, SLR, supine hip abduction LAQ, hip flexion, knee flexion with TB Sit to stands Standing: heel raises, hip flexion, hip abduction, mini-squats   Repetition/Time :    15  15  10   10     Resist or Assist :       Red TB           Comment :    No supine hip abd      Lower mat  No mini squats    Done :    yes  yes    yes   yes                     Exercise 5  Exercise 6  Exercise 7  Exercise 8    Exercise :    Step-up   Foam: EO, EC NuStep      Repetition/Time :    10   2 x 30 sec   5 min        Resist or Assist :          L4        Comment :    4 inch box   Single leg stand with support    LE only        Done :    yes   " Yes    yes                         Exercise 9  Exercise 10  Exercise 11  Exercise 12    Exercise :                  Repetition/Time :                  Resist or Assist :                  Comment :                  Done :                                  Patient Education and Home Exercises     Home Exercises Provided and Patient Education Provided     Education provided: Educated pt on importance of HEP and encouraged pt to continue daily    ASSESSMENT     Patient tolerated treatment well today despite fatigue due to 3 days of therapy in a row. Minimal verbal cues needed for form.    Eleanor Is progressing well towards her goals.   Pt prognosis is Fair.     Pt will continue to benefit from skilled outpatient physical therapy to address the deficits listed in the problem list box on initial evaluation, provide pt/family education and to maximize pt's level of independence in the home and community environment.     Pt's spiritual, cultural and educational needs considered and pt agreeable to plan of care and goals.     Anticipated barriers to physical therapy: obesity, SOB with minimal activity, multiple falls     Goals:  Short Term Goals: 4 weeks      Pt will demonstrate knowledge and independence with HEP to continue with exercises outside of therapy     Reduce c/o pain in B knees to < 2/10 pain level with daily activities     Improve strength in B LE to 4-/5 MMT throughout in order to improve tolerance with overall functional mobility     Long Term Goals: 6 weeks      Improve confidence with functional mobility in and outside of home as evidence by improved      Improve strength in B LE to 4/5 MMT throughout in order to improve tolerance with overall functional mobility     Pt will improve distance and stability with amb using rollator RW performed modified Independent >300 ft and improved TUG score to < 15 secs to improve community amb     Pt will reduce risk for falls and improve ability and safety with  transfers as evidence by improved 5x sit to stands to < 20 secs     Pt will improve balance and reduce risk for falls as evidence by improved MCTSIB to 3/4 (30,30,30,15)    PLAN     Continue with current Plan of Care and progress per pt's tolerance    Nguyễn Elizondo, PTA

## 2022-09-19 ENCOUNTER — CLINICAL SUPPORT (OUTPATIENT)
Dept: REHABILITATION | Facility: HOSPITAL | Age: 67
End: 2022-09-19
Payer: MEDICARE

## 2022-09-19 DIAGNOSIS — M62.81 MUSCLE WEAKNESS (GENERALIZED): Primary | ICD-10-CM

## 2022-09-19 DIAGNOSIS — M25.561 BILATERAL CHRONIC KNEE PAIN: ICD-10-CM

## 2022-09-19 DIAGNOSIS — G89.29 BILATERAL CHRONIC KNEE PAIN: ICD-10-CM

## 2022-09-19 DIAGNOSIS — R29.6 MULTIPLE FALLS: ICD-10-CM

## 2022-09-19 DIAGNOSIS — M25.562 BILATERAL CHRONIC KNEE PAIN: ICD-10-CM

## 2022-09-19 DIAGNOSIS — R26.81 UNSTEADINESS ON FEET: ICD-10-CM

## 2022-09-19 PROCEDURE — 97110 THERAPEUTIC EXERCISES: CPT

## 2022-09-19 NOTE — PROGRESS NOTES
[facility]   Physical Therapy Treatment Note     Name: Eleanor Buckner  Clinic Number: [id number]    Therapy Diagnosis:   Encounter Diagnoses   Name Primary?    Muscle weakness (generalized) Yes    Bilateral chronic knee pain     Unsteadiness on feet     Multiple falls                Physician: Emery Morel MD    Authorization Period Expiration: 10/7/2022  Plan of Care Expiration: 10/7/2022  Reassessment / POC Due: 9/23/2022  Visit # / Visits authorized: 9 / 18     PTA Visit: 1/5    Visit Date: 9/19/2022    Time In: 0955  Time Out: 1047  Total Billable Time: 52 minutes    SUBJECTIVE     Pt reports: discomfort as tiredness in B LE musculature.   She was compliant with some of home exercise program.  Response to previous treatment: good   Functional change: none    Pain: 3/10  Location: upper thigh musculature soreness     OBJECTIVE     Objective Measures updated at progress report unless specified.     Treatment     Eleanor received the treatments listed below:      therapeutic exercises to develop strength and endurance for 30 minutes including:     neuromuscular re-education activities to improve: Balance and Proprioception for 0 minutes. The following activities were included:     Therapeutic Exercise Grid     Exercise 1  Exercise 2  Exercise 3  Exercise 4    Exercise :    B heel slides, SLR, supine hip abduction LAQ, hip flexion, knee flexion with TB Sit to stands Standing: heel raises, hip flexion, hip abduction, mini-squats   Repetition/Time :    15  15  10   X15  X 10- mini squats    Resist or Assist :       Red TB           Comment :    No supine hip abd      Lower mat      Done :    yes  yes    yes   yes                     Exercise 5  Exercise 6  Exercise 7  Exercise 8    Exercise :    Step-up   Foam: EO, EC NuStep      Repetition/Time :    10   2 x 30 sec   5 min        Resist or Assist :          L4        Comment :    4 inch box   Single leg stand with support    LE  only        Done :    yes   No    yes                         Exercise 9  Exercise 10  Exercise 11  Exercise 12    Exercise :                  Repetition/Time :                  Resist or Assist :                  Comment :                  Done :                                  Patient Education and Home Exercises     Home Exercises Provided and Patient Education Provided     Education provided: Educated pt on importance of HEP and encouraged pt to continue daily    ASSESSMENT     Eleanor tolerated treatment well today completing all prescribed exercises and reps. Requested to do step ups earlier in routine as muscles are fatigued following mat routine. Min VC for proper completion of targeted muscle exercises.   Eleanor Is progressing well towards her goals.   Pt prognosis is Fair.     Pt will continue to benefit from skilled outpatient physical therapy to address the deficits listed in the problem list box on initial evaluation, provide pt/family education and to maximize pt's level of independence in the home and community environment.     Pt's spiritual, cultural and educational needs considered and pt agreeable to plan of care and goals.     Anticipated barriers to physical therapy: obesity, SOB with minimal activity, multiple falls     Goals:  Short Term Goals: 4 weeks      Pt will demonstrate knowledge and independence with HEP to continue with exercises outside of therapy     Reduce c/o pain in B knees to < 2/10 pain level with daily activities     Improve strength in B LE to 4-/5 MMT throughout in order to improve tolerance with overall functional mobility     Long Term Goals: 6 weeks      Improve confidence with functional mobility in and outside of home as evidence by improved      Improve strength in B LE to 4/5 MMT throughout in order to improve tolerance with overall functional mobility     Pt will improve distance and stability with amb using rollator RW performed modified Independent >300 ft and  improved TUG score to < 15 secs to improve community amb     Pt will reduce risk for falls and improve ability and safety with transfers as evidence by improved 5x sit to stands to < 20 secs     Pt will improve balance and reduce risk for falls as evidence by improved MCTSIB to 3/4 (30,30,30,15)    PLAN     Continue with current Plan of Care and progress per pt's tolerance. Focus on improving overall musculature endurance.     Saul Bhatti, PT

## 2022-09-22 ENCOUNTER — CLINICAL SUPPORT (OUTPATIENT)
Dept: REHABILITATION | Facility: HOSPITAL | Age: 67
End: 2022-09-22
Payer: MEDICARE

## 2022-09-22 DIAGNOSIS — R26.81 UNSTEADINESS ON FEET: ICD-10-CM

## 2022-09-22 DIAGNOSIS — M62.81 MUSCLE WEAKNESS (GENERALIZED): Primary | ICD-10-CM

## 2022-09-22 DIAGNOSIS — R29.6 MULTIPLE FALLS: ICD-10-CM

## 2022-09-22 DIAGNOSIS — M25.561 BILATERAL CHRONIC KNEE PAIN: ICD-10-CM

## 2022-09-22 DIAGNOSIS — M25.562 BILATERAL CHRONIC KNEE PAIN: ICD-10-CM

## 2022-09-22 DIAGNOSIS — G89.29 BILATERAL CHRONIC KNEE PAIN: ICD-10-CM

## 2022-09-22 PROCEDURE — 97110 THERAPEUTIC EXERCISES: CPT

## 2022-09-22 NOTE — PROGRESS NOTES
[facility]   Physical Therapy Treatment Note     Name: Eleanor Buckner  Clinic Number: [id number]    Therapy Diagnosis:   Encounter Diagnoses   Name Primary?    Muscle weakness (generalized) Yes    Bilateral chronic knee pain     Unsteadiness on feet     Multiple falls                Physician: Emery Morel MD    Authorization Period Expiration: 10/7/2022  Plan of Care Expiration: 10/7/2022  Reassessment / POC Due: 9/23/2022  Visit # / Visits authorized: 10 / 18     PTA Visit: 1/5    Visit Date: 9/22/2022    Time In: 1130  Time Out: 1205  Total Billable Time: 35 minutes    SUBJECTIVE     Pt reports: soreness in B upper legs . States that her overall mobility has improved.   She was compliant with some of home exercise program.  Response to previous treatment: good   Functional change: none    Pain: 3/10  Location: upper thigh musculature soreness     OBJECTIVE     Objective Measures updated at progress report unless specified.     Treatment     Eleanor received the treatments listed below:      therapeutic exercises to develop strength and endurance for 30 minutes including:     neuromuscular re-education activities to improve: Balance and Proprioception for 0 minutes. The following activities were included:     Therapeutic Exercise Grid     Exercise 1  Exercise 2  Exercise 3  Exercise 4    Exercise :    B heel slides, SLR, supine hip abduction LAQ, hip flexion, knee flexion with TB Sit to stands Standing: heel raises, hip flexion, hip abduction, mini-squats   Repetition/Time :    15  20  10   X15  X 10- mini squats    Resist or Assist :       Red TB   1 Kg ball         Comment :    No supine hip abd      Lower mat  Heel raises only     Done :    yes  yes    yes   yes                     Exercise 5  Exercise 6  Exercise 7  Exercise 8    Exercise :    Step-up   Foam: EO, EC NuStep      Repetition/Time :    10   2 x 30 sec   6 min        Resist or Assist :          L4         Comment :    4 inch box   Single leg stand with support    LE only        Done :    yes   No    yes                         Exercise 9  Exercise 10  Exercise 11  Exercise 12    Exercise :                  Repetition/Time :                  Resist or Assist :                  Comment :                  Done :                                  Patient Education and Home Exercises     Home Exercises Provided and Patient Education Provided     Education provided: Educated pt on importance of HEP and encouraged pt to continue daily    ASSESSMENT     Elaenor tolerated treatment well today with increases in reps and resistance. Added 1 kg ball to STS from low mat.   Eleanor Is progressing well towards her goals.   Pt prognosis is Fair.     Pt will continue to benefit from skilled outpatient physical therapy to address the deficits listed in the problem list box on initial evaluation, provide pt/family education and to maximize pt's level of independence in the home and community environment.     Pt's spiritual, cultural and educational needs considered and pt agreeable to plan of care and goals.     Anticipated barriers to physical therapy: obesity, SOB with minimal activity, multiple falls     Goals:  Short Term Goals: 4 weeks      Pt will demonstrate knowledge and independence with HEP to continue with exercises outside of therapy     Reduce c/o pain in B knees to < 2/10 pain level with daily activities     Improve strength in B LE to 4-/5 MMT throughout in order to improve tolerance with overall functional mobility     Long Term Goals: 6 weeks      Improve confidence with functional mobility in and outside of home as evidence by improved      Improve strength in B LE to 4/5 MMT throughout in order to improve tolerance with overall functional mobility     Pt will improve distance and stability with amb using rollator RW performed modified Independent >300 ft and improved TUG score to < 15 secs to improve community  amb     Pt will reduce risk for falls and improve ability and safety with transfers as evidence by improved 5x sit to stands to < 20 secs     Pt will improve balance and reduce risk for falls as evidence by improved MCTSIB to 3/4 (30,30,30,15)    PLAN     Continue with current Plan of Care and progress per pt's tolerance. Focus on improving overall musculature endurance.     Saul Bhatti, PT                         115

## 2022-09-23 ENCOUNTER — CLINICAL SUPPORT (OUTPATIENT)
Dept: REHABILITATION | Facility: HOSPITAL | Age: 67
End: 2022-09-23
Payer: MEDICARE

## 2022-09-23 DIAGNOSIS — M62.81 MUSCLE WEAKNESS (GENERALIZED): Primary | ICD-10-CM

## 2022-09-23 DIAGNOSIS — M25.561 BILATERAL CHRONIC KNEE PAIN: ICD-10-CM

## 2022-09-23 DIAGNOSIS — G89.29 BILATERAL CHRONIC KNEE PAIN: ICD-10-CM

## 2022-09-23 DIAGNOSIS — R26.81 UNSTEADINESS ON FEET: ICD-10-CM

## 2022-09-23 DIAGNOSIS — R29.6 MULTIPLE FALLS: ICD-10-CM

## 2022-09-23 DIAGNOSIS — M25.562 BILATERAL CHRONIC KNEE PAIN: ICD-10-CM

## 2022-09-23 PROCEDURE — 97110 THERAPEUTIC EXERCISES: CPT

## 2022-09-23 NOTE — PROGRESS NOTES
[facility]   Physical Therapy Treatment Note     Name: Eleanor Buckner  Clinic Number: [id number]    Therapy Diagnosis:   Encounter Diagnoses   Name Primary?    Muscle weakness (generalized) Yes    Bilateral chronic knee pain     Unsteadiness on feet     Multiple falls                Physician: Emery Morel MD    Authorization Period Expiration: 10/7/2022  Plan of Care Expiration: 10/7/2022  Reassessment / POC Due: 9/23/2022  Visit # / Visits authorized: 11 / 18     PTA Visit: 1/5    Visit Date: 9/23/2022    Time In: 1034  Time Out: 1105  Total Billable Time: 31 minutes    SUBJECTIVE     Pt reports: muscle soreness specifically R upper thigh area.    She was compliant with some of home exercise program.  Response to previous treatment: good   Functional change: none    Pain: 2/10  Location: upper thigh musculature soreness     OBJECTIVE     Objective Measures updated at progress report unless specified.     Treatment     Eleanor received the treatments listed below:      therapeutic exercises to develop strength and endurance for 31 minutes including:     neuromuscular re-education activities to improve: Balance and Proprioception for 0 minutes. The following activities were included:     Therapeutic Exercise Grid     Exercise 1  Exercise 2  Exercise 3  Exercise 4    Exercise :    B heel slides, SLR, supine hip abduction LAQ, hip flexion, knee flexion with TB Sit to stands Standing: heel raises, hip flexion, hip abduction, mini-squats   Repetition/Time :    15  20  10   X15  X 10- mini squats    Resist or Assist :       Red TB   1 Kg ball         Comment :    No supine hip abd      Lower mat  Heel raises only     Done :    yes  yes    yes   yes                     Exercise 5  Exercise 6  Exercise 7  Exercise 8    Exercise :    Step-up   Foam: EO, EC NuStep      Repetition/Time :    10   2 x 30 sec   6 min        Resist or Assist :          L4        Comment :    4 inch box    Single leg stand with support    LE only        Done :    yes   No    yes                         Exercise 9  Exercise 10  Exercise 11  Exercise 12    Exercise :                  Repetition/Time :                  Resist or Assist :                  Comment :                  Done :                                  Patient Education and Home Exercises     Home Exercises Provided and Patient Education Provided     Education provided: Educated pt on importance of HEP and encouraged pt to continue daily    ASSESSMENT     Eleanor tolerated treatment well today completing all prescribed exercises and reps with minimal Verbal and tactile cueing.   Eleanor Is progressing well towards her goals.   Pt prognosis is Fair.     Pt will continue to benefit from skilled outpatient physical therapy to address the deficits listed in the problem list box on initial evaluation, provide pt/family education and to maximize pt's level of independence in the home and community environment.     Pt's spiritual, cultural and educational needs considered and pt agreeable to plan of care and goals.     Anticipated barriers to physical therapy: obesity, SOB with minimal activity, multiple falls     Goals:  Short Term Goals: 4 weeks      Pt will demonstrate knowledge and independence with HEP to continue with exercises outside of therapy     Reduce c/o pain in B knees to < 2/10 pain level with daily activities     Improve strength in B LE to 4-/5 MMT throughout in order to improve tolerance with overall functional mobility     Long Term Goals: 6 weeks      Improve confidence with functional mobility in and outside of home as evidence by improved      Improve strength in B LE to 4/5 MMT throughout in order to improve tolerance with overall functional mobility     Pt will improve distance and stability with amb using rollator RW performed modified Independent >300 ft and improved TUG score to < 15 secs to improve community amb     Pt will reduce  risk for falls and improve ability and safety with transfers as evidence by improved 5x sit to stands to < 20 secs     Pt will improve balance and reduce risk for falls as evidence by improved MCTSIB to 3/4 (30,30,30,15)    PLAN     Continue with current Plan of Care and progress per pt's tolerance. Focus on improving overall musculature endurance.     Saul Bhatti, PT

## 2022-09-26 ENCOUNTER — CLINICAL SUPPORT (OUTPATIENT)
Dept: REHABILITATION | Facility: HOSPITAL | Age: 67
End: 2022-09-26
Payer: MEDICARE

## 2022-09-26 DIAGNOSIS — M62.81 MUSCLE WEAKNESS (GENERALIZED): Primary | ICD-10-CM

## 2022-09-26 DIAGNOSIS — M25.562 BILATERAL CHRONIC KNEE PAIN: ICD-10-CM

## 2022-09-26 DIAGNOSIS — R26.81 UNSTEADINESS ON FEET: ICD-10-CM

## 2022-09-26 DIAGNOSIS — G89.29 BILATERAL CHRONIC KNEE PAIN: ICD-10-CM

## 2022-09-26 DIAGNOSIS — M25.561 BILATERAL CHRONIC KNEE PAIN: ICD-10-CM

## 2022-09-26 DIAGNOSIS — R29.6 MULTIPLE FALLS: ICD-10-CM

## 2022-09-26 PROCEDURE — 97110 THERAPEUTIC EXERCISES: CPT

## 2022-09-26 NOTE — PROGRESS NOTES
[facility]   Physical Therapy Treatment Note     Name: Eleanor Buckner  Clinic Number: [id number]    Therapy Diagnosis:   Encounter Diagnoses   Name Primary?    Muscle weakness (generalized) Yes    Bilateral chronic knee pain     Unsteadiness on feet     Multiple falls                Physician: Emery Morel MD    Authorization Period Expiration: 10/7/2022  Plan of Care Expiration: 10/7/2022  Reassessment / POC Due: 9/23/2022  Visit # / Visits authorized: 11 / 18     PTA Visit: 1/5    Visit Date: 9/26/2022    Time In: 1100  Time Out: 1135  Total Billable Time: 35 minutes    SUBJECTIVE     Pt reports: that she had a good weekend and was not as sore as she had been earlier in  the week.   She was compliant with some of home exercise program.  Response to previous treatment: good   Functional change: none    Pain: 2/10  Location: upper thigh musculature soreness     OBJECTIVE     Objective Measures updated at progress report unless specified.     Treatment     Eleanor received the treatments listed below:      therapeutic exercises to develop strength and endurance for 31 minutes including:     neuromuscular re-education activities to improve: Balance and Proprioception for 0 minutes. The following activities were included:     Therapeutic Exercise Grid     Exercise 1  Exercise 2  Exercise 3  Exercise 4    Exercise :    B heel slides, SLR, supine hip abduction LAQ, hip flexion, knee flexion with TB Sit to stands Standing: heel raises, hip flexion, hip abduction, mini-squats   Repetition/Time :    15  20  10   X15  X 10- mini squats    Resist or Assist :       Red TB   1 Kg ball         Comment :    No supine hip abd      Lower mat  Heel raises only     Done :    yes  yes    yes   yes                     Exercise 5  Exercise 6  Exercise 7  Exercise 8    Exercise :    Step-up   Foam: EO, EC NuStep      Repetition/Time :    10   2 x 30 sec   6 min        Resist or Assist :          L4         Comment :    4 inch box   Single leg stand with support    LE only        Done :    yes   No    yes                         Exercise 9  Exercise 10  Exercise 11  Exercise 12    Exercise :                  Repetition/Time :                  Resist or Assist :                  Comment :                  Done :                                  Patient Education and Home Exercises     Home Exercises Provided and Patient Education Provided     Education provided: Educated pt on importance of HEP and encouraged pt to continue daily    ASSESSMENT     Eleanor tolerated treatment well today. Improved tolerance to increased repetitions noted. Increased stability with functional mobility. Min VC for correct performance of exercises.   Eleanor Is progressing well towards her goals.   Pt prognosis is Fair.     Pt will continue to benefit from skilled outpatient physical therapy to address the deficits listed in the problem list box on initial evaluation, provide pt/family education and to maximize pt's level of independence in the home and community environment.     Pt's spiritual, cultural and educational needs considered and pt agreeable to plan of care and goals.     Anticipated barriers to physical therapy: obesity, SOB with minimal activity, multiple falls     Goals:  Short Term Goals: 4 weeks      Pt will demonstrate knowledge and independence with HEP to continue with exercises outside of therapy     Reduce c/o pain in B knees to < 2/10 pain level with daily activities     Improve strength in B LE to 4-/5 MMT throughout in order to improve tolerance with overall functional mobility     Long Term Goals: 6 weeks      Improve confidence with functional mobility in and outside of home as evidence by improved      Improve strength in B LE to 4/5 MMT throughout in order to improve tolerance with overall functional mobility     Pt will improve distance and stability with amb using rollator RW performed modified  Independent >300 ft and improved TUG score to < 15 secs to improve community amb     Pt will reduce risk for falls and improve ability and safety with transfers as evidence by improved 5x sit to stands to < 20 secs     Pt will improve balance and reduce risk for falls as evidence by improved MCTSIB to 3/4 (30,30,30,15)    PLAN     Continue with current Plan of Care and progress per pt's tolerance. Focus on improving overall musculature endurance.     Saul Bhatti, PT

## 2022-09-28 ENCOUNTER — CLINICAL SUPPORT (OUTPATIENT)
Dept: REHABILITATION | Facility: HOSPITAL | Age: 67
End: 2022-09-28
Payer: MEDICARE

## 2022-09-28 DIAGNOSIS — M25.562 BILATERAL CHRONIC KNEE PAIN: ICD-10-CM

## 2022-09-28 DIAGNOSIS — R26.81 UNSTEADINESS ON FEET: ICD-10-CM

## 2022-09-28 DIAGNOSIS — M25.561 BILATERAL CHRONIC KNEE PAIN: ICD-10-CM

## 2022-09-28 DIAGNOSIS — G89.29 BILATERAL CHRONIC KNEE PAIN: ICD-10-CM

## 2022-09-28 DIAGNOSIS — R29.6 MULTIPLE FALLS: ICD-10-CM

## 2022-09-28 DIAGNOSIS — M62.81 MUSCLE WEAKNESS (GENERALIZED): Primary | ICD-10-CM

## 2022-09-28 PROCEDURE — 97110 THERAPEUTIC EXERCISES: CPT | Mod: CQ

## 2022-09-28 NOTE — PROGRESS NOTES
[facility]   Physical Therapy Treatment Note     Name: Eleanor Buckner  Clinic Number: [id number]    Therapy Diagnosis:   Encounter Diagnoses   Name Primary?    Muscle weakness (generalized) Yes    Bilateral chronic knee pain     Unsteadiness on feet     Multiple falls                  Physician: Emery Morel MD    Authorization Period Expiration: 10/7/2022  Plan of Care Expiration: 10/7/2022  Reassessment / POC Due: 9/23/2022  Visit # / Visits authorized: 11 / 18     PTA Visit: 1/5    Visit Date: 9/28/2022    Time In: 1025  Time Out: 1057  Total Billable Time: 32 minutes    SUBJECTIVE     Pt reports: feeling a little tired in the legs, and some arthritic pain in B LE when getting up. Denies any falls.   She was compliant with some of home exercise program.  Response to previous treatment: good   Functional change: none    Pain: 3/10  Location: upper thigh musculature soreness     OBJECTIVE     Objective Measures updated at progress report unless specified.     Treatment     Eleanor received the treatments listed below:      therapeutic exercises to develop strength and endurance for 32 minutes including:     neuromuscular re-education activities to improve: Balance and Proprioception for 0 minutes. The following activities were included:     Therapeutic Exercise Grid     Exercise 1  Exercise 2  Exercise 3  Exercise 4    Exercise :    B heel slides, SLR, supine hip abduction LAQ, hip flexion, knee flexion with TB Sit to stands Standing: heel raises, hip flexion, hip abduction, mini-squats   Repetition/Time :    25  20,25  10   X15  X 10- mini squats    Resist or Assist :       Red TB   1 Kg ball         Comment :    No supine hip abd      Lower mat  Heel raises only     Done :    yes  yes    yes   yes                     Exercise 5  Exercise 6  Exercise 7  Exercise 8    Exercise :    Step-up   Foam: EO, EC NuStep      Repetition/Time :    10   2 x 30 sec   7 min        Resist or  Assist :          L4        Comment :    4 inch box   Single leg stand with support    LE only        Done :    yes   No    yes                         Exercise 9  Exercise 10  Exercise 11  Exercise 12    Exercise :                  Repetition/Time :                  Resist or Assist :                  Comment :                  Done :                                  Patient Education and Home Exercises     Home Exercises Provided and Patient Education Provided     Education provided: Educated pt on importance of HEP and encouraged pt to continue daily    ASSESSMENT     Increased reps of heel slides, LAQ, and hip flexion with good tolerance. She needed one seated rest break after standing exercises.     Eleanor Is progressing well towards her goals.   Pt prognosis is Fair.     Pt will continue to benefit from skilled outpatient physical therapy to address the deficits listed in the problem list box on initial evaluation, provide pt/family education and to maximize pt's level of independence in the home and community environment.     Pt's spiritual, cultural and educational needs considered and pt agreeable to plan of care and goals.     Anticipated barriers to physical therapy: obesity, SOB with minimal activity, multiple falls     Goals:  Short Term Goals: 4 weeks      Pt will demonstrate knowledge and independence with HEP to continue with exercises outside of therapy     Reduce c/o pain in B knees to < 2/10 pain level with daily activities     Improve strength in B LE to 4-/5 MMT throughout in order to improve tolerance with overall functional mobility     Long Term Goals: 6 weeks      Improve confidence with functional mobility in and outside of home as evidence by improved      Improve strength in B LE to 4/5 MMT throughout in order to improve tolerance with overall functional mobility     Pt will improve distance and stability with amb using rollator RW performed modified Independent >300 ft and improved TUG  score to < 15 secs to improve community amb     Pt will reduce risk for falls and improve ability and safety with transfers as evidence by improved 5x sit to stands to < 20 secs     Pt will improve balance and reduce risk for falls as evidence by improved MCTSIB to 3/4 (30,30,30,15)    PLAN     Continue with current Plan of Care and progress per pt's tolerance. Focus on improving overall musculature endurance.     Nguyễn Elizondo, PTA

## 2022-09-29 ENCOUNTER — HOSPITAL ENCOUNTER (OUTPATIENT)
Dept: RADIOLOGY | Facility: HOSPITAL | Age: 67
Discharge: HOME | End: 2022-09-29
Attending: NURSE PRACTITIONER
Payer: MEDICARE

## 2022-09-29 DIAGNOSIS — R60.0 PERIPHERAL EDEMA: ICD-10-CM

## 2022-09-29 PROCEDURE — 93971 EXTREMITY STUDY: CPT | Mod: TC,LT

## 2022-09-30 LAB
LEFT GREAT SAPHENOUS DISTAL THIGH DIA: 0.53 CM
LEFT GREAT SAPHENOUS DISTAL THIGH REFLUX: 0 MS
LEFT GREAT SAPHENOUS JUNCTION DIA: 0.72 CM
LEFT GREAT SAPHENOUS JUNCTION REFLUX: 0 MS
LEFT GREAT SAPHENOUS KNEE DIA: 0.52 CM
LEFT GREAT SAPHENOUS KNEE REFLUX: 0 MS
LEFT GREAT SAPHENOUS MIDDLE THIGH DIA: 0.8 CM
LEFT GREAT SAPHENOUS MIDDLE THIGH REFLUX: 0 MS
LEFT GREAT SAPHENOUS PROXIMAL CALF DIA: 0.49 CM
LEFT GREAT SAPHENOUS PROXIMAL CALF REFLUX: 0 MS
LEFT SMALL SAPHENOUS SPJ DIA: 0.25 CM
LEFT SMALL SAPHENOUS SPJ REFLUX: 0 MS

## 2022-10-03 ENCOUNTER — HOSPITAL ENCOUNTER (OUTPATIENT)
Dept: RADIOLOGY | Facility: HOSPITAL | Age: 67
Discharge: HOME | End: 2022-10-03
Attending: NURSE PRACTITIONER
Payer: MEDICARE

## 2022-10-03 DIAGNOSIS — R60.0 PERIPHERAL EDEMA: ICD-10-CM

## 2022-10-03 PROCEDURE — 93971 EXTREMITY STUDY: CPT | Mod: TC,RT

## 2022-10-04 ENCOUNTER — CLINICAL SUPPORT (OUTPATIENT)
Dept: REHABILITATION | Facility: HOSPITAL | Age: 67
End: 2022-10-04
Payer: MEDICARE

## 2022-10-04 DIAGNOSIS — R26.81 UNSTEADINESS ON FEET: ICD-10-CM

## 2022-10-04 DIAGNOSIS — M62.81 MUSCLE WEAKNESS (GENERALIZED): Primary | ICD-10-CM

## 2022-10-04 DIAGNOSIS — R29.6 MULTIPLE FALLS: ICD-10-CM

## 2022-10-04 DIAGNOSIS — M25.561 BILATERAL CHRONIC KNEE PAIN: ICD-10-CM

## 2022-10-04 DIAGNOSIS — M25.562 BILATERAL CHRONIC KNEE PAIN: ICD-10-CM

## 2022-10-04 DIAGNOSIS — G89.29 BILATERAL CHRONIC KNEE PAIN: ICD-10-CM

## 2022-10-04 PROCEDURE — 97110 THERAPEUTIC EXERCISES: CPT

## 2022-10-04 NOTE — PLAN OF CARE
[facility]  Physical Therapy Plan of Care Note    Name: Eleanor Buckner  Clinic Number: [id number]    Therapy Diagnosis:   Encounter Diagnoses   Name Primary?    Muscle weakness (generalized) Yes    Bilateral chronic knee pain     Unsteadiness on feet     Multiple falls      Physician: Emery Morel MD    Visit Date: 10/4/2022  Time In: 13:00  Time Out: 13:35  Total billable minutes: 35     Physician Orders: PT Eval and Treat  Medical Diagnosis from Referral:  post fx deconditioning, gait and balance deficits   Evaluation Date:8/29/2022  Authorization Period Expiration: 10/13/2022  Reassessment / POC completed: 10/4/2022  Visit # / Visits authorized: 14/ 18      Precautions: Standard and Fall  Functional Level Prior to Evaluation:  supervision using Rollator RW, limited due to pain, weakness, SOB    SUBJECTIVE     Pt reports: she is feeling a little stronger but still gets tired easily, limited walking and standing tolerance due to SOB and LE pain.  She was compliant with some of home exercise program.  Response to previous treatment: fair  Functional change: slightly increased strength    Pain: 3/10  Location: bilateral LE     OBJECTIVE     Update:   5x sit to stand: 17 secs  TUG: 15 secs using rollator RW  MCTSIB: 4/4 (30,30,30,30)  B LE strength: 4-/5 MMT  Supervision required for safety with functional mobility using rollator RW    Treatment:   therapeutic exercises to develop strength and endurance for 35 minutes including:     neuromuscular re-education activities to improve: Balance and Proprioception for 0 minutes. The following activities were included:     Therapeutic Exercise Grid     Exercise 1  Exercise 2  Exercise 3  Exercise 4    Exercise :    B heel slides, SLR, supine hip abduction LAQ, hip flexion, knee flexion with TB Sit to stands Standing: heel raises, hip flexion, hip abduction, mini-squats   Repetition/Time :    25  20,25  10   X15  X 10- mini squats     Resist or Assist :       Red TB   2Kg ball         Comment :    No supine hip abd      Lower mat       Done :    no  no    yes   yes                     Exercise 5  Exercise 6  Exercise 7  Exercise 8    Exercise :    Step-up   Foam: EO, EC NuStep Step negotiation     Repetition/Time :    10   30 sec each   7 min   4 steps x 2     Resist or Assist :          L4   B rails     Comment :    4 inch box    LE only        Done :    no  yes yes   yes                      Exercise 9  Exercise 10  Exercise 11  Exercise 12    Exercise :                  Repetition/Time :                  Resist or Assist :                  Comment :                  Done :                                 ASSESSMENT     Update: Pt progressing slowly with overall functional mobility continuing to require supervision for safety using rollator RW, limited distance with amb due to increased fatigue and SOB, and weakness. LE strength has improved as well as reduced risk for falls but continues to be at risk for falls. Pt would benefit from continued PT to address continued above deficits    Anticipated barriers to physical therapy: obesity, SOB with minimal activity, multiple falls     Goals:  Short Term Goals: 4 weeks      Pt will demonstrate knowledge and independence with HEP to continue with exercises outside of therapy- progressing (pt reports minimally performs HEP outside of therapy)     Reduce c/o pain in B knees to < 2/10 pain level with daily activities- progressing (c/o 3/10 pain level in B LE)     Improve strength in B LE to 4-/5 MMT throughout in order to improve tolerance with overall functional mobility- met (4-/5 MMT throughout)     Long Term Goals: 6 weeks      Improve confidence with functional mobility in and outside of home as evidence by improved- progressing (continues to be fearful of falling)      Improve strength in B LE to 4/5 MMT throughout in order to improve tolerance with overall functional mobility- progressing (4-/5  MMT throughout)     Pt will improve distance and stability with amb using rollator RW performed modified Independent >300 ft and improved TUG score to < 15 secs to improve community amb- progressing (TUG 15 secs using rollator RW, supervision distance limited)     Pt will reduce risk for falls and improve ability and safety with transfers as evidence by improved 5x sit to stands to < 20 secs- progressing (17 secs)     Pt will improve balance and reduce risk for falls as evidence by improved MCTSIB to 3/4 (30,30,30,15)- met (4/4 30,30,30,30)    Reasons for Recertification of Therapy:  continues weakness, limited mobility    PLAN     Recommended Treatment Plan: 2 times per week for 4 weeks:  Neuromuscular Re-ed, Patient Education, Therapeutic Exercise, and pain management    Massiel Regalado, PT    I CERTIFY THE NEED FOR THESE SERVICES FURNISHED UNDER THIS PLAN OF TREATMENT AND WHILE UNDER MY CARE  Physician's comments:      Physician's Signature: ___________________________________________________

## 2022-10-05 ENCOUNTER — CLINICAL SUPPORT (OUTPATIENT)
Dept: REHABILITATION | Facility: HOSPITAL | Age: 67
End: 2022-10-05
Payer: MEDICARE

## 2022-10-05 DIAGNOSIS — M62.81 MUSCLE WEAKNESS (GENERALIZED): Primary | ICD-10-CM

## 2022-10-05 DIAGNOSIS — R26.81 UNSTEADINESS ON FEET: ICD-10-CM

## 2022-10-05 DIAGNOSIS — M25.562 BILATERAL CHRONIC KNEE PAIN: ICD-10-CM

## 2022-10-05 DIAGNOSIS — M25.561 BILATERAL CHRONIC KNEE PAIN: ICD-10-CM

## 2022-10-05 DIAGNOSIS — G89.29 BILATERAL CHRONIC KNEE PAIN: ICD-10-CM

## 2022-10-05 DIAGNOSIS — R29.6 MULTIPLE FALLS: ICD-10-CM

## 2022-10-05 LAB
RIGHT GREAT SAPHENOUS DISTAL THIGH DIA: 0.44 CM
RIGHT GREAT SAPHENOUS DISTAL THIGH REFLUX: 0 MS
RIGHT GREAT SAPHENOUS JUNCTION DIA: 0.75 CM
RIGHT GREAT SAPHENOUS JUNCTION REFLUX: 0 MS
RIGHT GREAT SAPHENOUS KNEE DIA: 0.33 CM
RIGHT GREAT SAPHENOUS KNEE REFLUX: 0 MS
RIGHT GREAT SAPHENOUS MIDDLE THIGH DIA: 0.73 CM
RIGHT GREAT SAPHENOUS MIDDLE THIGH REFLUX: 0 MS
RIGHT GREAT SAPHENOUS PROXIMAL CALF DIA: 0.43 CM
RIGHT GREAT SAPHENOUS PROXIMAL CALF REFLUX: 0 MS
RIGHT SMALL SAPHENOUS SPJ DIA: 0.26 CM
RIGHT SMALL SAPHENOUS SPJ REFLUX: 0 MS

## 2022-10-05 PROCEDURE — 97110 THERAPEUTIC EXERCISES: CPT

## 2022-10-05 NOTE — PROGRESS NOTES
[facility]   Physical Therapy Treatment Note     Name: Eleanor Buckner  Clinic Number: [id number]    Therapy Diagnosis:   Encounter Diagnoses   Name Primary?    Muscle weakness (generalized) Yes    Bilateral chronic knee pain     Unsteadiness on feet     Multiple falls                  Physician: Emery Morel MD    Authorization Period Expiration: 10/7/2022  Plan of Care Expiration: 10/7/2022  Reassessment / POC Due: 9/23/2022  Visit # / Visits authorized: 15 / 18     PTA Visit: 1/5    Visit Date: 10/5/2022    Time In: 1023  Time Out: 1102  Total Billable Time: 39 minutes    SUBJECTIVE     Pt reports: that she is feeling good today. Increased walking without rollator for short distances.   She was compliant with some of home exercise program.  Response to previous treatment: good   Functional change: improved stability without rollator     Pain: 2/10  Location: upper thigh musculature soreness     OBJECTIVE     Objective Measures updated at progress report unless specified.     Treatment     Eleanor received the treatments listed below:      therapeutic exercises to develop strength and endurance for 39 minutes including:      The following activities were included:     Therapeutic Exercise Grid     Exercise 1  Exercise 2  Exercise 3  Exercise 4    Exercise :    B heel slides, SLR, supine hip abduction LAQ, hip flexion, knee flexion with TB Sit to stands Standing: heel raises, hip flexion, hip abduction, mini-squats   Repetition/Time :    25  20,25  X15    X15      Resist or Assist :       Red TB   1 Kg ball         Comment :    No supine hip abd      Lower mat       Done :    NO  NO     yes   yes                     Exercise 5  Exercise 6  Exercise 7  Exercise 8    Exercise :    Step-up   Foam: EO, NuStep Balance: B Semi-tandem     Repetition/Time :    x5   2 x 30 sec   8 min   2 x 30 sec each     Resist or Assist :    fwd      L4   Hard surface      Comment :    6 inch box    Parallel feet     LE only        Done :    yes   YES    yes   YES                       Exercise 9  Exercise 10  Exercise 11  Exercise 12    Exercise :    Sit-stand-walk               Repetition/Time :    X 5              Resist or Assist :    10 ft no AD              Comment :                  Done :    Yes                               Patient Education and Home Exercises     Home Exercises Provided and Patient Education Provided     Education provided: Educated pt on importance of HEP and encouraged pt to continue daily    ASSESSMENT   Eleanor tolerated treatment fair today with mod fatigue during exercise session requiring multiple breaks. Minimal Verbal and tactile cues required for proper performance of exercises. Improved stability noted during balance activities.     Eleanor Is progressing well towards her goals.   Pt prognosis is Fair.     Pt will continue to benefit from skilled outpatient physical therapy to address the deficits listed in the problem list box on initial evaluation, provide pt/family education and to maximize pt's level of independence in the home and community environment.     Pt's spiritual, cultural and educational needs considered and pt agreeable to plan of care and goals.     Anticipated barriers to physical therapy: obesity, SOB with minimal activity, multiple falls     Goals:  Short Term Goals: 4 weeks      Pt will demonstrate knowledge and independence with HEP to continue with exercises outside of therapy     Reduce c/o pain in B knees to < 2/10 pain level with daily activities     Improve strength in B LE to 4-/5 MMT throughout in order to improve tolerance with overall functional mobility     Long Term Goals: 6 weeks      Improve confidence with functional mobility in and outside of home as evidence by improved      Improve strength in B LE to 4/5 MMT throughout in order to improve tolerance with overall functional mobility     Pt will improve distance and stability with amb  using rollator RW performed modified Independent >300 ft and improved TUG score to < 15 secs to improve community amb     Pt will reduce risk for falls and improve ability and safety with transfers as evidence by improved 5x sit to stands to < 20 secs     Pt will improve balance and reduce risk for falls as evidence by improved MCTSIB to 3/4 (30,30,30,15)    PLAN     Continue with current Plan of Care and progress per pt's tolerance. Focus on improving overall musculature endurance.     Saul Bhatti, PT

## 2022-10-09 DIAGNOSIS — E55.9 VITAMIN D DEFICIENCY: Primary | ICD-10-CM

## 2022-10-10 RX ORDER — FUROSEMIDE 20 MG/1
10 TABLET ORAL DAILY PRN
Qty: 15 TABLET | Refills: 2 | Status: CANCELLED | OUTPATIENT
Start: 2022-10-10 | End: 2022-11-09

## 2022-10-11 ENCOUNTER — CLINICAL SUPPORT (OUTPATIENT)
Dept: REHABILITATION | Facility: HOSPITAL | Age: 67
End: 2022-10-11
Payer: MEDICARE

## 2022-10-11 DIAGNOSIS — M25.562 BILATERAL CHRONIC KNEE PAIN: ICD-10-CM

## 2022-10-11 DIAGNOSIS — R26.81 UNSTEADINESS ON FEET: ICD-10-CM

## 2022-10-11 DIAGNOSIS — M62.81 MUSCLE WEAKNESS (GENERALIZED): Primary | ICD-10-CM

## 2022-10-11 DIAGNOSIS — G89.29 BILATERAL CHRONIC KNEE PAIN: ICD-10-CM

## 2022-10-11 DIAGNOSIS — M25.561 BILATERAL CHRONIC KNEE PAIN: ICD-10-CM

## 2022-10-11 DIAGNOSIS — R29.6 MULTIPLE FALLS: ICD-10-CM

## 2022-10-11 PROCEDURE — 97110 THERAPEUTIC EXERCISES: CPT

## 2022-10-11 NOTE — PROGRESS NOTES
[facility]   Physical Therapy Treatment Note     Name: Eleanor Buckner  Clinic Number: [id number]    Therapy Diagnosis:   Encounter Diagnoses   Name Primary?    Muscle weakness (generalized) Yes    Bilateral chronic knee pain     Unsteadiness on feet     Multiple falls                  Physician: Emery Morel MD    Authorization Period Expiration: 10/7/2022  Plan of Care Expiration: 10/7/2022  Reassessment / POC Due: 9/23/2022  Visit # / Visits authorized: 15 / 18     PTA Visit: 1/5    Visit Date: 10/11/2022    Time In: 1030  Time Out: 1112  Total Billable Time: 42 minutes    SUBJECTIVE     Pt reports: increased soreness after last treatment , but that resolved and she has been doing well .   She was compliant with some of home exercise program.  Response to previous treatment: good   Functional change: improved stability without rollator     Pain: 3/10  Location: upper thigh musculature soreness     OBJECTIVE     Objective Measures updated at progress report unless specified.     Treatment     Eleanor received the treatments listed below:      therapeutic exercises to develop strength and endurance for 39 minutes including:      The following activities were included:     Therapeutic Exercise Grid     Exercise 1  Exercise 2  Exercise 3  Exercise 4    Exercise :    B heel slides, SLR, supine hip abduction LAQ, hip flexion, knee flexion with TB Sit to stands Standing: heel raises, hip flexion, hip abduction, mini-squats   Repetition/Time :    25  20,25  X15    X15      Resist or Assist :       Red TB   2 Kg ball         Comment :    No supine hip abd      Lower mat       Done :    NO  NO     yes   yes                     Exercise 5  Exercise 6  Exercise 7  Exercise 8    Exercise :    Step-up   Foam: EO, NuStep Balance: B Semi-tandem     Repetition/Time :    x5   2 x 30 sec   8 min   2 x 30 sec each     Resist or Assist :    fwd      L4   Hard surface      Comment :    6 inch box    Parallel feet     LE only        Done :    yes   YES    yes   YES                       Exercise 9  Exercise 10  Exercise 11  Exercise 12    Exercise :    Sit-stand-walk               Repetition/Time :    X 5              Resist or Assist :    10 ft no AD              Comment :                  Done :    NO                              Patient Education and Home Exercises     Home Exercises Provided and Patient Education Provided     Education provided: Educated pt on importance of HEP and encouraged pt to continue daily    ASSESSMENT   Eleanor tolerated treatment well today. Demonstrated improved stability during balance activities and gait.     Eleanor Is progressing well towards her goals.   Pt prognosis is Fair.     Pt will continue to benefit from skilled outpatient physical therapy to address the deficits listed in the problem list box on initial evaluation, provide pt/family education and to maximize pt's level of independence in the home and community environment.     Pt's spiritual, cultural and educational needs considered and pt agreeable to plan of care and goals.     Anticipated barriers to physical therapy: obesity, SOB with minimal activity, multiple falls     Goals:  Short Term Goals: 4 weeks      Pt will demonstrate knowledge and independence with HEP to continue with exercises outside of therapy     Reduce c/o pain in B knees to < 2/10 pain level with daily activities     Improve strength in B LE to 4-/5 MMT throughout in order to improve tolerance with overall functional mobility     Long Term Goals: 6 weeks      Improve confidence with functional mobility in and outside of home as evidence by improved      Improve strength in B LE to 4/5 MMT throughout in order to improve tolerance with overall functional mobility     Pt will improve distance and stability with amb using rollator RW performed modified Independent >300 ft and improved TUG score to < 15 secs to improve community amb     Pt will  reduce risk for falls and improve ability and safety with transfers as evidence by improved 5x sit to stands to < 20 secs     Pt will improve balance and reduce risk for falls as evidence by improved MCTSIB to 3/4 (30,30,30,15)    PLAN     Continue with current Plan of Care and progress per pt's tolerance. Focus on improving overall musculature endurance.     Saul Bhatti, PT

## 2022-10-12 ENCOUNTER — HOSPITAL ENCOUNTER (OUTPATIENT)
Dept: RADIOLOGY | Facility: HOSPITAL | Age: 67
Discharge: HOME | End: 2022-10-12
Attending: ORTHOPAEDIC SURGERY
Payer: MEDICARE

## 2022-10-12 ENCOUNTER — OFFICE VISIT (OUTPATIENT)
Dept: ORTHOPEDICS | Facility: CLINIC | Age: 67
End: 2022-10-12
Payer: MEDICARE

## 2022-10-12 VITALS — BODY MASS INDEX: 55.32 KG/M2 | WEIGHT: 293 LBS | HEIGHT: 61 IN

## 2022-10-12 DIAGNOSIS — M25.531 RIGHT WRIST PAIN: Primary | ICD-10-CM

## 2022-10-12 DIAGNOSIS — M25.531 RIGHT WRIST PAIN: ICD-10-CM

## 2022-10-12 DIAGNOSIS — S62.109D CLOSED FRACTURE OF WRIST WITH ROUTINE HEALING, UNSPECIFIED LATERALITY, SUBSEQUENT ENCOUNTER: ICD-10-CM

## 2022-10-12 PROCEDURE — 99214 OFFICE O/P EST MOD 30 MIN: CPT | Mod: PBBFAC

## 2022-10-12 PROCEDURE — 99024 POSTOP FOLLOW-UP VISIT: CPT | Mod: POP,,, | Performed by: ORTHOPAEDIC SURGERY

## 2022-10-12 PROCEDURE — 99024 PR POST-OP FOLLOW-UP VISIT: ICD-10-PCS | Mod: POP,,, | Performed by: ORTHOPAEDIC SURGERY

## 2022-10-12 PROCEDURE — 73110 X-RAY EXAM OF WRIST: CPT | Mod: TC,RT

## 2022-10-12 NOTE — PROGRESS NOTES
ATTENDING ADDENDUM    Eleanor Buckner  was evaluated at the time of the encounter with Dr Delgado PGY3.  HPI, PE and treatment plan was reviewed. Treatment plan was reasonable and necessary. Imaging was reviewed at the time of visit.     I was present during critical or key resident-provided service and procedure portions of the visit.

## 2022-10-12 NOTE — PROGRESS NOTES
[facility]  Established Patient Office Visit  10/12/2022       Patient ID: Eleanor Buckner  YOB: 1955  MRN: [id number]    Diagnosis:    The primary encounter diagnosis was Right wrist pain. A diagnosis of Closed fracture of wrist with routine healing, unspecified laterality, subsequent encounter was also pertinent to this visit.     Procedure:     Orif, Fracture, Radius, Distal - Right on 7/14/2022      Chief Complaint: Post-op Evaluation of the Right Wrist      Eleanor Buckner is a 67 y.o. female who presents for follow up status post ORIF right distal radius 07/14/2022.  Patient last seen 6 weeks ago.  She has been doing very well since that time.  Been working on range of motion and feels that her wrist range of motion is about the same as the other side.  This does not limit her in any way.  She has no pain.  She is able to use her walker without any issue.      Past Medical History:    Past Medical History:   Diagnosis Date    Depression     Hyperlipemia      Past Surgical History:   Procedure Laterality Date    APPENDECTOMY      ARTHROPLASTY OF SHOULDER      CHOLECYSTECTOMY      COLONOSCOPY  01/13/2020    ESOPHAGOGASTRODUODENOSCOPY  06/10/2016    HEMORRHOID SURGERY  03/23/2016    OPEN REDUCTION AND INTERNAL FIXATION (ORIF) OF FRACTURE OF DISTAL RADIUS Right 7/14/2022    Procedure: ORIF, FRACTURE, RADIUS, DISTAL;  Surgeon: Emery Morel MD;  Location: [facility];  Service: Orthopedics;  Laterality: Right;    PROCTOSCOPY  03/23/2016     Family History   Problem Relation Age of Onset    Heart failure Mother     Osteoporosis Sister     Osteoporosis Brother     Gout Brother      Social History     Socioeconomic History    Marital status:    Tobacco Use    Smoking status: Never    Smokeless tobacco: Never   Substance and Sexual Activity    Alcohol use: Never    Drug use: Never    Sexual activity: Not Currently     Medication List with Changes/Refills    Current Medications    ALBUTEROL (PROVENTIL/VENTOLIN HFA) 90 MCG/ACTUATION INHALER    Inhale into the lungs.    ALENDRONATE (FOSAMAX) 70 MG TABLET    Take 1 tablet (70 mg total) by mouth once a week.    AZITHROMYCIN (Z-ELIZABETH) 250 MG TABLET    Take by mouth.    BACLOFEN (LIORESAL) 20 MG TABLET    Take 1 tablet (20 mg total) by mouth 3 (three) times daily as needed.    BUDESONIDE (RINOCORT AQUA) 32 MCG/ACTUATION NASAL SPRAY    1 spray by Nasal route once daily at 6am.    BUPROPION (WELLBUTRIN XL) 150 MG TB24 TABLET    Take 1 tablet (150 mg total) by mouth once daily.    DOCUSATE SODIUM 100 MG CAPSULE    Take 100 mg by mouth daily as needed.    ERGOCALCIFEROL (ERGOCALCIFEROL) 50,000 UNIT CAP    Take 1 capsule (50,000 Units total) by mouth every 7 days.    ESOMEPRAZOLE (NEXIUM) 20 MG CAPSULE    Nexium Take No date recorded No form recorded No frequency recorded No route recorded No set duration recorded No set duration amount recorded active No dosage strength recorded No dosage strength units of measure recorded    ESOMEPRAZOLE (NEXIUM) 40 MG CAPSULE    Take 1 capsule (40 mg total) by mouth once daily at 6am.    FLUTICASONE PROPIONATE (FLONASE) 50 MCG/ACTUATION NASAL SPRAY    2 sprays by Each Nostril route once daily at 6am.    FUROSEMIDE (LASIX) 20 MG TABLET    Take 0.5 tablets (10 mg total) by mouth daily as needed (swelling).    GABAPENTIN (NEURONTIN) 100 MG CAPSULE    gabapentin Take No date recorded No form recorded No frequency recorded No route recorded No set duration recorded No set duration amount recorded active No dosage strength recorded No dosage strength units of measure recorded    GABAPENTIN (NEURONTIN) 600 MG TABLET    Take 1 tablet (600 mg total) by mouth 3 (three) times daily.    HYDROCODONE-ACETAMINOPHEN (NORCO)  MG PER TABLET    Take 1 tablet by mouth every 6 (six) hours as needed.    LORATADINE (CLARITIN) 10 MG TABLET    Take 10 mg by mouth once daily at 6am.    PRAMIPEXOLE (MIRAPEX) 0.125  MG TABLET    Mirapex Take No date recorded No form recorded No frequency recorded No route recorded No set duration recorded No set duration amount recorded active No dosage strength recorded No dosage strength units of measure recorded    PRAMIPEXOLE (MIRAPEX) 0.5 MG TABLET    Take 1 tablet (0.5 mg total) by mouth every evening.    ROSUVASTATIN (CRESTOR) 10 MG TABLET    Take 1 tablet (10 mg total) by mouth once daily at 6am.    SERTRALINE (ZOLOFT) 100 MG TABLET    Take 2 tablets (200 mg total) by mouth once daily.    TRAMADOL (ULTRAM) 50 MG TABLET    Take 50 mg by mouth every 6 (six) hours as needed.    VENTOLIN HFA 90 MCG/ACTUATION INHALER    Inhale 2 puffs into the lungs every 6 (six) hours as needed.     Review of patient's allergies indicates:   Allergen Reactions    Nsaids (non-steroidal anti-inflammatory drug) Nausea And Vomiting       ROS:    Body mass index is 68.74 kg/m².  GENERAL: Well appearing, appropriate for stated age, no acute distress.  CARDIOVASCULAR: Pulses regular by peripheral palpation.  PULMONARY: Respirations are even and non-labored.  NEURO: Awake, alert, and oriented x 3.  PSYCH: Mood & affect are appropriate.  HEENT: Head is normocephalic and atraumatic.    Physical Exam:    RUE  She is neurovascularly intact distally in the right arm.  Incision is clean dry and intact without drainage or erythema.  No tenderness to palpation over the distal radius.  Able make a full composite fist and fully extend all fingers.  Able to flex and extend at the wrist, comparable to contralateral side but slightly limited wrist flexion.  No pain with wrist range of motion.    Imaging:    X-rays of the right wrist obtained and interpreted today demonstrate orthopedic hardware in place without evidence of loosening, moving, or complication.  Fracture with interval healing compared to previous imaging.    Assessment and Plan:    Eleanor Buckner is a 67 y.o. female seen in the office today for The primary  encounter diagnosis was Right wrist pain. A diagnosis of Closed fracture of wrist with routine healing, unspecified laterality, subsequent encounter was also pertinent to this visit..  Date of surgery 07/14/2022. Overall patient doing very well now 12 weeks status post open reduction and internal fixation of right distal radius fracture.  She has no pain and has pretty good range of motion at the wrist.  She is using her arm as needed and has no issues with using her walker.  She would like to follow up as needed at this point. I think this is reasonable and she will call with any issues. ANG KO.    Shmuel Delgado MD  [facility] Orthopedic Surgery

## 2022-10-13 ENCOUNTER — CLINICAL SUPPORT (OUTPATIENT)
Dept: REHABILITATION | Facility: HOSPITAL | Age: 67
End: 2022-10-13
Payer: MEDICARE

## 2022-10-13 DIAGNOSIS — M25.562 BILATERAL CHRONIC KNEE PAIN: ICD-10-CM

## 2022-10-13 DIAGNOSIS — R29.6 MULTIPLE FALLS: ICD-10-CM

## 2022-10-13 DIAGNOSIS — M62.81 MUSCLE WEAKNESS (GENERALIZED): Primary | ICD-10-CM

## 2022-10-13 DIAGNOSIS — M25.561 BILATERAL CHRONIC KNEE PAIN: ICD-10-CM

## 2022-10-13 DIAGNOSIS — G89.29 BILATERAL CHRONIC KNEE PAIN: ICD-10-CM

## 2022-10-13 DIAGNOSIS — R26.81 UNSTEADINESS ON FEET: ICD-10-CM

## 2022-10-13 PROCEDURE — 97110 THERAPEUTIC EXERCISES: CPT | Mod: CQ

## 2022-10-13 NOTE — PROGRESS NOTES
[facility]   Physical Therapy Treatment Note     Name: Eleanor Buckner  Clinic Number: [id number]    Therapy Diagnosis:   Encounter Diagnoses   Name Primary?    Muscle weakness (generalized) Yes    Bilateral chronic knee pain     Unsteadiness on feet     Multiple falls                  Physician: Emery Morel MD    Authorization Period Expiration: 10/7/2022  Plan of Care Expiration: 10/7/2022  Reassessment / POC Due: 9/23/2022  Visit # / Visits authorized: 17 / 18     PTA Visit: 1/5    Visit Date: 10/13/2022    Time In: 1050  Time Out: 1128  Total Billable Time: 38 minutes    SUBJECTIVE     Pt reports: not quite as sore today. Did not sleep well due to cpap machine issue.  She was compliant with some of home exercise program.  Response to previous treatment: good   Functional change: improved stability without rollator     Pain: 1/10  Location: upper thigh musculature soreness     OBJECTIVE     Objective Measures updated at progress report unless specified.     Treatment     Eleanor received the treatments listed below:      therapeutic exercises to develop strength and endurance for 38 minutes including:      The following activities were included:     Therapeutic Exercise Grid     Exercise 1  Exercise 2  Exercise 3  Exercise 4    Exercise :    B heel slides, SLR, supine hip abduction LAQ, hip flexion, knee flexion with TB Sit to stands Standing: heel raises, hip flexion, hip abduction, mini-squats   Repetition/Time :    25  20,25  X15    X15      Resist or Assist :       Red TB   2 Kg ball         Comment :    No supine hip abd      Lower mat       Done :    NO  NO     yes   yes                     Exercise 5  Exercise 6  Exercise 7  Exercise 8    Exercise :    Step-up   Foam: EO, NuStep Balance: B Semi-tandem     Repetition/Time :    x5   2 x 30 sec   8 min   2 x 30 sec each     Resist or Assist :    fwd      L4   Hard surface      Comment :    6 inch box   Parallel feet      LE only        Done :    yes   YES    yes   YES                       Exercise 9  Exercise 10  Exercise 11  Exercise 12    Exercise :    Sit-stand-walk               Repetition/Time :    X 5              Resist or Assist :    10 ft no AD              Comment :                  Done :    NO                              Patient Education and Home Exercises     Home Exercises Provided and Patient Education Provided     Education provided: Educated pt on importance of HEP and encouraged pt to continue daily    ASSESSMENT     Patient completed all exercises with good effort. No LOB during balance exercises, but she did c/o increased LB pain while standing without UE support.    Eleanor Is progressing well towards her goals.   Pt prognosis is Fair.     Pt will continue to benefit from skilled outpatient physical therapy to address the deficits listed in the problem list box on initial evaluation, provide pt/family education and to maximize pt's level of independence in the home and community environment.     Pt's spiritual, cultural and educational needs considered and pt agreeable to plan of care and goals.     Anticipated barriers to physical therapy: obesity, SOB with minimal activity, multiple falls     Goals:  Short Term Goals: 4 weeks      Pt will demonstrate knowledge and independence with HEP to continue with exercises outside of therapy     Reduce c/o pain in B knees to < 2/10 pain level with daily activities     Improve strength in B LE to 4-/5 MMT throughout in order to improve tolerance with overall functional mobility     Long Term Goals: 6 weeks      Improve confidence with functional mobility in and outside of home as evidence by improved      Improve strength in B LE to 4/5 MMT throughout in order to improve tolerance with overall functional mobility     Pt will improve distance and stability with amb using rollator RW performed modified Independent >300 ft and improved TUG score to < 15 secs to improve  community amb     Pt will reduce risk for falls and improve ability and safety with transfers as evidence by improved 5x sit to stands to < 20 secs     Pt will improve balance and reduce risk for falls as evidence by improved MCTSIB to 3/4 (30,30,30,15)    PLAN     Continue with current Plan of Care and progress per pt's tolerance. Focus on improving overall musculature endurance.     Ngyuễn Elizondo, PTA

## 2022-10-14 RX ORDER — FUROSEMIDE 20 MG/1
10 TABLET ORAL DAILY PRN
Qty: 30 TABLET | Refills: 1 | Status: SHIPPED | OUTPATIENT
Start: 2022-10-14 | End: 2022-11-04 | Stop reason: SDUPTHER

## 2022-11-04 ENCOUNTER — OFFICE VISIT (OUTPATIENT)
Dept: CARDIOLOGY | Facility: CLINIC | Age: 67
End: 2022-11-04
Payer: MEDICARE

## 2022-11-04 VITALS
RESPIRATION RATE: 16 BRPM | HEART RATE: 76 BPM | HEIGHT: 65 IN | TEMPERATURE: 98 F | BODY MASS INDEX: 48.82 KG/M2 | SYSTOLIC BLOOD PRESSURE: 119 MMHG | OXYGEN SATURATION: 99 % | DIASTOLIC BLOOD PRESSURE: 52 MMHG | WEIGHT: 293 LBS

## 2022-11-04 DIAGNOSIS — G47.33 OSA ON CPAP: ICD-10-CM

## 2022-11-04 DIAGNOSIS — E78.5 HYPERLIPIDEMIA LDL GOAL <100: ICD-10-CM

## 2022-11-04 DIAGNOSIS — E66.01 MORBID OBESITY: ICD-10-CM

## 2022-11-04 DIAGNOSIS — R60.0 BILATERAL LOWER EXTREMITY EDEMA: ICD-10-CM

## 2022-11-04 DIAGNOSIS — R06.09 DYSPNEA ON EXERTION: Primary | ICD-10-CM

## 2022-11-04 PROCEDURE — 99214 PR OFFICE/OUTPT VISIT, EST, LEVL IV, 30-39 MIN: ICD-10-PCS | Mod: S$PBB,,, | Performed by: NURSE PRACTITIONER

## 2022-11-04 PROCEDURE — 99214 OFFICE O/P EST MOD 30 MIN: CPT | Mod: S$PBB,,, | Performed by: NURSE PRACTITIONER

## 2022-11-04 PROCEDURE — 99215 OFFICE O/P EST HI 40 MIN: CPT | Mod: PBBFAC | Performed by: NURSE PRACTITIONER

## 2022-11-04 RX ORDER — FUROSEMIDE 20 MG/1
20 TABLET ORAL DAILY PRN
Qty: 30 TABLET | Refills: 6 | Status: SHIPPED | OUTPATIENT
Start: 2022-11-04 | End: 2024-02-09

## 2022-11-04 NOTE — PROGRESS NOTES
CHIEF COMPLAINT:   Chief Complaint   Patient presents with    Follow-up     Patient here for 3 mth f/u appt. Pt c/o dyspnea on exertion. Pt c/o braeden feet swelling. Pt denies cp, palpitations, and dizziness.                  Review of patient's allergies indicates:   Allergen Reactions    Nsaids (non-steroidal anti-inflammatory drug) Nausea And Vomiting                                          HPI:  Eleanor Buckner 67 y.o. female with a past medical history of Morbid Obesity and HLD presents for follow up and results of Venous Insuffiencey Studies. RLE venous study on 10.3.22 showed no DVT and no substantial reflux study.  LLE venous study on 9.29.22 showed possible non-occlusive deep vein thrombus but no substantial reflux. Patient completed a Lexiscan Stress Test on 5.13.22 which was abnormal revealing a mild intensity, small sized, fixed perfusion abnormality consistent with scar in the apical wall(s) in the typical distribution of the LAD territory. Patient completed an chocardiogram in November 2021 which revealed an ejection fraction of approximately 55%. See report below. She also completed LINDY testing in November 2021 which revealed no evidence of significant arterial insufficiency. She completed a nuclear stress test on 9.19.19 that revealed no ischemia and no wall motion abnormality. She completed PFTs that revealed no abnormalities.     Patient continues to endorse shortness of breath with ambulation as well as bilateral lower extremity edema.  Patient states the shortness of breath has worsened since last visit.  She denies chest pain, palpitations, or syncope.  Patient was recently diagnosed with KI and is compliant with her CPAP nightly. Patient states she is complaint with her current medication regimen.     RLE Venous Insuffiencey Study 10.3.22:  There is no evidence of a right lower extremity DVT.  There was no substantial reflux identified in the visualized veins of the right leg deep system,  GSV, saphenofemoral junction, or SSV.    LLE Venous Insuffiencey Study 9.29.22:                                                                                                                                                                         There was a thin linear echogenic filling defect in the femoral vein at the level of the mid thigh. This finding could represent a non-occlusive chronic deep vein thrombus. The femoral vein appears patent and compressible where visualized.   There was no substantial reflux identified in the visualized veins of the left leg deep system, GSV, saphenofemoral junction, or SSV.    Nuclear Stress Test 5.13.22:  Abnormal myocardial perfusion scan.  There is a mild intensity, small sized, fixed perfusion abnormality consistent with scar in the apical wall(s) in the typical distribution of the LAD territory.  There are no other significant perfusion abnormalities.  The gated perfusion images showed an ejection fraction of 64% post stress.  The patient reported no chest pain during the stress test.     Echocardiogram November 2021:  Left ventricular ejection fraction is measured at approximately 55%.  Structurally normal mitral valve.  Structurally trileaflet aortic valve.  No evidence of tricuspid regurgitation.  No evidence of pulmonic regurgitation.  Mildly dilated left atrium.  Normal right atrial size.  Right ventricular cavity is normal in size.  No evidence of a pericardial effusion.  No evidence of pleural effusion.  IVC is normal.    LINDY testing November 2021:  No evidence of significant arterial insufficiency was identified on the study.    Lexiscan 9.19.19:  EF 59% with no wall motion abnormalities  No ischemia, inferior scar    TTE 7.13.19:  Left ventricular ejection fraction measured approximately 55%  Moderate dilated left atrium  There is mild tricuspid regurgitation with estimated RVSP of 36 mmHg  Trace pulmonic regurgitation present    Patient Active Problem List    Diagnosis    Osteoporosis    GERD (gastroesophageal reflux disease)    Hyperlipidemia LDL goal <100    Anxiety    Dyspnea on exertion    Class 3 severe obesity due to excess calories with serious comorbidity and body mass index (BMI) of 60.0 to 69.9 in adult    Closed fracture of right distal radius    Peripheral edema    Muscle weakness (generalized)    Bilateral chronic knee pain    Unsteadiness on feet    Multiple falls    Vitamin D deficiency    Sleep apnea     Past Surgical History:   Procedure Laterality Date    APPENDECTOMY      ARTHROPLASTY OF SHOULDER      CHOLECYSTECTOMY      COLONOSCOPY  01/13/2020    ESOPHAGOGASTRODUODENOSCOPY  06/10/2016    HEMORRHOID SURGERY  03/23/2016    OPEN REDUCTION AND INTERNAL FIXATION (ORIF) OF FRACTURE OF DISTAL RADIUS Right 7/14/2022    Procedure: ORIF, FRACTURE, RADIUS, DISTAL;  Surgeon: Emery Morel MD;  Location: [facility];  Service: Orthopedics;  Laterality: Right;    PROCTOSCOPY  03/23/2016     Social History     Socioeconomic History    Marital status:    Tobacco Use    Smoking status: Never    Smokeless tobacco: Never   Substance and Sexual Activity    Alcohol use: Never    Drug use: Never    Sexual activity: Not Currently        Family History   Problem Relation Age of Onset    Heart failure Mother     Osteoporosis Sister     Osteoporosis Brother     Gout Brother          Current Outpatient Medications:     albuterol (PROVENTIL/VENTOLIN HFA) 90 mcg/actuation inhaler, Inhale into the lungs., Disp: , Rfl:     alendronate (FOSAMAX) 70 MG tablet, Take 1 tablet (70 mg total) by mouth once a week., Disp: 12 tablet, Rfl: 2    baclofen (LIORESAL) 20 MG tablet, Take 1 tablet (20 mg total) by mouth 3 (three) times daily as needed., Disp: 60 tablet, Rfl: 1    budesonide (RINOCORT AQUA) 32 mcg/actuation nasal spray, 1 spray by Nasal route once daily at 6am., Disp: , Rfl:     buPROPion (WELLBUTRIN XL) 150 MG TB24 tablet, Take 1 tablet (150 mg total) by mouth once  daily., Disp: 30 tablet, Rfl: 3    docusate sodium 100 mg capsule, Take 100 mg by mouth daily as needed., Disp: , Rfl:     esomeprazole (NEXIUM) 40 MG capsule, Take 1 capsule (40 mg total) by mouth once daily at 6am., Disp: 90 capsule, Rfl: 2    fluticasone propionate (FLONASE) 50 mcg/actuation nasal spray, 2 sprays by Each Nostril route once daily at 6am., Disp: , Rfl:     furosemide (LASIX) 20 MG tablet, Take 0.5 tablets (10 mg total) by mouth daily as needed (swelling)., Disp: 30 tablet, Rfl: 1    gabapentin (NEURONTIN) 600 MG tablet, Take 1 tablet (600 mg total) by mouth 3 (three) times daily., Disp: 90 tablet, Rfl: 6    loratadine (CLARITIN) 10 mg tablet, Take 10 mg by mouth once daily at 6am., Disp: , Rfl:     pramipexole (MIRAPEX) 0.125 MG tablet, Mirapex Take No date recorded No form recorded No frequency recorded No route recorded No set duration recorded No set duration amount recorded active No dosage strength recorded No dosage strength units of measure recorded, Disp: , Rfl:     rosuvastatin (CRESTOR) 10 MG tablet, Take 1 tablet (10 mg total) by mouth once daily at 6am., Disp: 90 tablet, Rfl: 2    sertraline (ZOLOFT) 100 MG tablet, Take 2 tablets (200 mg total) by mouth once daily., Disp: 180 tablet, Rfl: 2    VENTOLIN HFA 90 mcg/actuation inhaler, Inhale 2 puffs into the lungs every 6 (six) hours as needed., Disp: 18 g, Rfl: 2    azithromycin (Z-ELIZABETH) 250 MG tablet, Take by mouth., Disp: , Rfl:     ergocalciferol (ERGOCALCIFEROL) 50,000 unit Cap, Take 1 capsule (50,000 Units total) by mouth every 7 days., Disp: 12 capsule, Rfl: 0    HYDROcodone-acetaminophen (NORCO)  mg per tablet, Take 1 tablet by mouth every 6 (six) hours as needed., Disp: , Rfl:     pramipexole (MIRAPEX) 0.5 MG tablet, Take 1 tablet (0.5 mg total) by mouth every evening. (Patient not taking: Reported on 10/12/2022), Disp: 90 tablet, Rfl: 2    traMADoL (ULTRAM) 50 mg tablet, Take 50 mg by mouth every 6 (six) hours as needed.,  "Disp: , Rfl:      ROS:                                                                                                                                                                             Review of Systems   Constitutional: Negative.    Respiratory:  Positive for shortness of breath.    Cardiovascular:  Positive for leg swelling.   Gastrointestinal: Negative.    Musculoskeletal: Negative.         BLE pain   Skin: Negative.    Neurological: Negative.    Psychiatric/Behavioral: Negative.        Blood pressure (!) 119/52, pulse 76, temperature 97.7 °F (36.5 °C), temperature source Oral, resp. rate 16, height 5' 5.43" (1.662 m), weight (!) 166.2 kg (366 lb 6.5 oz), SpO2 99 %.     PE:  Physical Exam  Constitutional:       Appearance: Normal appearance. She is obese.   HENT:      Head: Normocephalic and atraumatic.   Eyes:      Extraocular Movements: Extraocular movements intact.      Pupils: Pupils are equal, round, and reactive to light.   Cardiovascular:      Rate and Rhythm: Normal rate and regular rhythm.   Pulmonary:      Effort: Pulmonary effort is normal.      Breath sounds: Normal breath sounds.   Abdominal:      Palpations: Abdomen is soft.   Musculoskeletal:         General: Normal range of motion.      Cervical back: Normal range of motion.      Right lower leg: Edema present.      Left lower leg: Edema present.   Skin:     General: Skin is warm and dry.   Neurological:      General: No focal deficit present.      Mental Status: She is alert and oriented to person, place, and time.   Psychiatric:         Mood and Affect: Mood normal.         Behavior: Behavior normal.      ASSESSMENT/PLAN:  CANO - increased  Lexiscan Stress Test 5.13.22 showed  a mild intensity, small sized, fixed perfusion abnormality consistent with scar in the apical wall(s) in the typical distribution of the LAD territory  EF 55% per Echo 11.2021  EF 55% per Echo 7.3.19  Lexiscan stress test - negative for ischemia with no wall " motion abnormalities (9.19.19)  PFTs - normal  Will order Echocardiogram for worsening CANO  Counseled patient on weight loss and increased activity as tolerated    HLD  LDL at goal - 84  Continue Crestor 10mg daily   Counseled on low-cholesterol, low-fat diet, and exercise as tolerated    Leg cramping/pain/edema  RLE venous study on 10.3.22 showed no DVT and no substantial reflux study  LLE venous study on 9.29.22 showed possible non-occlusive deep vein thrombus but no substantial reflux  LINDY testing Nov. 2021 - No evidence of significant arterial insufficiency was identified on the study  Will refer to Dr. Young for further evaluation of peripheral edema  Will increase Lasix to 20mg PO QDprn  Recommend low salt diet, leg elevation, and compression stockings if able    Morbid obesity  Counseled patient on weight loss, heart healthy diet, and increased activity as tolerated    KI  Compliant with home CPAP nightly    Will order Echocardiogram   Will refer to Dr. Young for further evaluation of peripheral edema   Increase Lasix to 20mg PO QDprn  Follow up in Cardiology Clinic in 3 months  Follow up with PCP as directed

## 2022-11-04 NOTE — PATIENT INSTRUCTIONS
Will order Echocardiogram   Will refer to Dr. Young for further evaluation of peripheral edema   Increase Lasix to 20mg PO QDprn  Follow up in Cardiology Clinic in 3 months  Follow up with PCP as directed

## 2022-11-10 ENCOUNTER — LAB VISIT (OUTPATIENT)
Dept: LAB | Facility: HOSPITAL | Age: 67
End: 2022-11-10
Attending: NURSE PRACTITIONER
Payer: MEDICARE

## 2022-11-10 DIAGNOSIS — E55.9 VITAMIN D DEFICIENCY: ICD-10-CM

## 2022-11-10 LAB
ALBUMIN SERPL-MCNC: 3.6 GM/DL (ref 3.4–4.8)
ALBUMIN/GLOB SERPL: 0.9 RATIO (ref 1.1–2)
ALP SERPL-CCNC: 90 UNIT/L (ref 40–150)
ALT SERPL-CCNC: 16 UNIT/L (ref 0–55)
AST SERPL-CCNC: 18 UNIT/L (ref 5–34)
BASOPHILS # BLD AUTO: 0.01 X10(3)/MCL (ref 0–0.2)
BASOPHILS NFR BLD AUTO: 0.2 %
BILIRUBIN DIRECT+TOT PNL SERPL-MCNC: 0.5 MG/DL
BUN SERPL-MCNC: 10.6 MG/DL (ref 9.8–20.1)
CALCIUM SERPL-MCNC: 9.4 MG/DL (ref 8.4–10.2)
CHLORIDE SERPL-SCNC: 104 MMOL/L (ref 98–107)
CO2 SERPL-SCNC: 26 MMOL/L (ref 23–31)
CREAT SERPL-MCNC: 0.74 MG/DL (ref 0.55–1.02)
DEPRECATED CALCIDIOL+CALCIFEROL SERPL-MC: 17.2 NG/ML (ref 30–80)
EOSINOPHIL # BLD AUTO: 0.2 X10(3)/MCL (ref 0–0.9)
EOSINOPHIL NFR BLD AUTO: 3.5 %
ERYTHROCYTE [DISTWIDTH] IN BLOOD BY AUTOMATED COUNT: 15.3 % (ref 11.5–17)
GFR SERPLBLD CREATININE-BSD FMLA CKD-EPI: >60 MLS/MIN/1.73/M2
GLOBULIN SER-MCNC: 4.2 GM/DL (ref 2.4–3.5)
GLUCOSE SERPL-MCNC: 127 MG/DL (ref 82–115)
HCT VFR BLD AUTO: 41 % (ref 37–47)
HGB BLD-MCNC: 12.2 GM/DL (ref 12–16)
IMM GRANULOCYTES # BLD AUTO: 0.03 X10(3)/MCL (ref 0–0.04)
IMM GRANULOCYTES NFR BLD AUTO: 0.5 %
LYMPHOCYTES # BLD AUTO: 1.51 X10(3)/MCL (ref 0.6–4.6)
LYMPHOCYTES NFR BLD AUTO: 26.2 %
MCH RBC QN AUTO: 27.7 PG (ref 27–31)
MCHC RBC AUTO-ENTMCNC: 29.8 MG/DL (ref 33–36)
MCV RBC AUTO: 93.2 FL (ref 80–94)
MONOCYTES # BLD AUTO: 0.31 X10(3)/MCL (ref 0.1–1.3)
MONOCYTES NFR BLD AUTO: 5.4 %
NEUTROPHILS # BLD AUTO: 3.7 X10(3)/MCL (ref 2.1–9.2)
NEUTROPHILS NFR BLD AUTO: 64.2 %
PLATELET # BLD AUTO: 218 X10(3)/MCL (ref 130–400)
PMV BLD AUTO: 9.5 FL (ref 7.4–10.4)
POTASSIUM SERPL-SCNC: 4 MMOL/L (ref 3.5–5.1)
PROT SERPL-MCNC: 7.8 GM/DL (ref 5.8–7.6)
RBC # BLD AUTO: 4.4 X10(6)/MCL (ref 4.2–5.4)
SODIUM SERPL-SCNC: 144 MMOL/L (ref 136–145)
WBC # SPEC AUTO: 5.8 X10(3)/MCL (ref 4.5–11.5)

## 2022-11-10 PROCEDURE — 82306 VITAMIN D 25 HYDROXY: CPT

## 2022-11-10 PROCEDURE — 36415 COLL VENOUS BLD VENIPUNCTURE: CPT

## 2022-11-10 PROCEDURE — 85025 COMPLETE CBC W/AUTO DIFF WBC: CPT

## 2022-11-10 PROCEDURE — 80053 COMPREHEN METABOLIC PANEL: CPT

## 2022-11-11 DIAGNOSIS — R26.81 UNSTABLE GAIT: ICD-10-CM

## 2022-11-11 DIAGNOSIS — R29.898 WEAKNESS OF BOTH LOWER LIMBS: Primary | ICD-10-CM

## 2022-11-15 ENCOUNTER — TELEPHONE (OUTPATIENT)
Dept: REHABILITATION | Facility: HOSPITAL | Age: 67
End: 2022-11-15

## 2022-11-17 ENCOUNTER — CLINICAL SUPPORT (OUTPATIENT)
Dept: REHABILITATION | Facility: HOSPITAL | Age: 67
End: 2022-11-17
Payer: MEDICARE

## 2022-11-17 DIAGNOSIS — R29.6 MULTIPLE FALLS: ICD-10-CM

## 2022-11-17 DIAGNOSIS — M25.561 BILATERAL CHRONIC KNEE PAIN: ICD-10-CM

## 2022-11-17 DIAGNOSIS — R26.81 UNSTEADINESS ON FEET: ICD-10-CM

## 2022-11-17 DIAGNOSIS — M62.81 MUSCLE WEAKNESS (GENERALIZED): Primary | ICD-10-CM

## 2022-11-17 DIAGNOSIS — G89.29 BILATERAL CHRONIC KNEE PAIN: ICD-10-CM

## 2022-11-17 DIAGNOSIS — M25.562 BILATERAL CHRONIC KNEE PAIN: ICD-10-CM

## 2022-11-17 PROCEDURE — 97110 THERAPEUTIC EXERCISES: CPT | Mod: CQ

## 2022-11-17 NOTE — PROGRESS NOTES
[facility]   Physical Therapy Treatment Note     Name: Eleanor Buckner  Clinic Number: [id number]    Therapy Diagnosis:   Encounter Diagnoses   Name Primary?    Muscle weakness (generalized) Yes    Bilateral chronic knee pain     Unsteadiness on feet     Multiple falls                    Physician: Nubia Lanier MD    Authorization Period Expiration: 12/12/2022  Plan of Care Expiration: 12/12/2022  Reassessment / POC Due:   Visit # / Visits authorized: 1/8     PTA Visit: 1/5    Visit Date: 11/17/2022    Time In: 0940  Time Out: 1008  Total Billable Time: 28 minutes    SUBJECTIVE     Pt reports: she is globally sore from arthritis and has been having SOB since she has been out of therapy. Will be having some tests done.   She was compliant with some of home exercise program.  Response to previous treatment: good   Functional change: improved stability without rollator     Pain: 8/10  Location: upper thigh musculature soreness     OBJECTIVE     Objective Measures updated at progress report unless specified.     Treatment     Eleanor received the treatments listed below:      therapeutic exercises to develop strength and endurance for 38 minutes including:      The following activities were included:     Therapeutic Exercise Grid     Exercise 1  Exercise 2  Exercise 3  Exercise 4    Exercise :    B heel slides, SLR, supine hip abduction LAQ, hip flexion, knee flexion with TB Sit to stands Standing: heel raises, hip flexion, hip abduction, mini-squats   Repetition/Time :    25  20,25  X10  X15      Resist or Assist :       Red TB           Comment :    No supine hip abd      Lower mat       Done :    yes  yes    yes   yes                     Exercise 5  Exercise 6  Exercise 7  Exercise 8    Exercise :    Step-up   Foam: EO, NuStep Balance: B Semi-tandem     Repetition/Time :    x5   2 x 30 sec   8 min   2 x 30 sec each     Resist or Assist :    fwd      L4   Hard surface      Comment  :    6 inch box   Parallel feet     LE only        Done :    no  no   no   no                      Exercise 9  Exercise 10  Exercise 11  Exercise 12    Exercise :    Sit-stand-walk               Repetition/Time :    X 5              Resist or Assist :    10 ft no AD              Comment :                  Done :    NO                              Patient Education and Home Exercises     Home Exercises Provided and Patient Education Provided     Education provided: Educated pt on importance of HEP and encouraged pt to continue daily    ASSESSMENT     Patient exhibits some deconditioning since she was last seen as evidenced by the need for rest breaks despite decreased reps of exercises as performed last visit. She will be scheduled for re-eval next session.    Eleanor Is progressing well towards her goals.   Pt prognosis is Fair.     Pt will continue to benefit from skilled outpatient physical therapy to address the deficits listed in the problem list box on initial evaluation, provide pt/family education and to maximize pt's level of independence in the home and community environment.     Pt's spiritual, cultural and educational needs considered and pt agreeable to plan of care and goals.     Anticipated barriers to physical therapy: obesity, SOB with minimal activity, multiple falls     Goals:  Short Term Goals: 4 weeks      Pt will demonstrate knowledge and independence with HEP to continue with exercises outside of therapy     Reduce c/o pain in B knees to < 2/10 pain level with daily activities     Improve strength in B LE to 4-/5 MMT throughout in order to improve tolerance with overall functional mobility     Long Term Goals: 6 weeks      Improve confidence with functional mobility in and outside of home as evidence by improved      Improve strength in B LE to 4/5 MMT throughout in order to improve tolerance with overall functional mobility     Pt will improve distance and stability with amb using rollator RW  performed modified Independent >300 ft and improved TUG score to < 15 secs to improve community amb     Pt will reduce risk for falls and improve ability and safety with transfers as evidence by improved 5x sit to stands to < 20 secs     Pt will improve balance and reduce risk for falls as evidence by improved MCTSIB to 3/4 (30,30,30,15)    PLAN     Continue with current Plan of Care and progress per pt's tolerance. Focus on improving overall musculature endurance.     Nguyễn Elizondo, PTA

## 2022-11-28 ENCOUNTER — DOCUMENTATION ONLY (OUTPATIENT)
Dept: REHABILITATION | Facility: HOSPITAL | Age: 67
End: 2022-11-28

## 2022-11-28 NOTE — PROGRESS NOTES
[facility]  Physical Therapy Discharge Note    Name: Eleanor Buckner  Clinic Number: [id number]    Medical diagnosis: post fx deconditioning, gout, balance deficits  PT diagnosis: weakness, gait instability, increased SOB, balance deficits    Date of Last visit: 11/17/2022  Total Visits Received: 18    Assessment      Eleanor Buckner did not return to physical therapy today for final assessment for discharge.  Eleanor goals were address as listed below and met as stated.  Eleanor was issued a home exercise program with handouts throughout this episode of care to reference for continued wellness and physical fitness . Contact information was given at evaluation in case any questions arise in the future or if therapy is needed.    Discharge reason: patient did not return for formal physical therapy. Pt requested to cancel all PT appointments because she is having an angiogram and other tests and will get new order to come back to PT later    Plan   This patient is discharged from Physical Therapy.

## 2022-11-30 ENCOUNTER — OFFICE VISIT (OUTPATIENT)
Dept: INTERNAL MEDICINE | Facility: CLINIC | Age: 67
End: 2022-11-30
Payer: MEDICARE

## 2022-11-30 DIAGNOSIS — E66.01 CLASS 3 SEVERE OBESITY DUE TO EXCESS CALORIES WITH SERIOUS COMORBIDITY AND BODY MASS INDEX (BMI) OF 60.0 TO 69.9 IN ADULT: Chronic | ICD-10-CM

## 2022-11-30 DIAGNOSIS — R60.0 PERIPHERAL EDEMA: Chronic | ICD-10-CM

## 2022-11-30 DIAGNOSIS — R06.09 DYSPNEA ON EXERTION: Primary | ICD-10-CM

## 2022-11-30 DIAGNOSIS — M81.0 AGE-RELATED OSTEOPOROSIS WITHOUT CURRENT PATHOLOGICAL FRACTURE: Chronic | ICD-10-CM

## 2022-11-30 DIAGNOSIS — F41.9 ANXIETY: Chronic | ICD-10-CM

## 2022-11-30 DIAGNOSIS — E55.9 VITAMIN D DEFICIENCY: Chronic | ICD-10-CM

## 2022-11-30 DIAGNOSIS — R73.01 IMPAIRED FASTING GLUCOSE: ICD-10-CM

## 2022-11-30 DIAGNOSIS — K21.9 GASTROESOPHAGEAL REFLUX DISEASE, UNSPECIFIED WHETHER ESOPHAGITIS PRESENT: Chronic | ICD-10-CM

## 2022-11-30 DIAGNOSIS — G89.29 CHRONIC LOW BACK PAIN WITH SCIATICA, SCIATICA LATERALITY UNSPECIFIED, UNSPECIFIED BACK PAIN LATERALITY: ICD-10-CM

## 2022-11-30 DIAGNOSIS — G47.33 OSA ON CPAP: Chronic | ICD-10-CM

## 2022-11-30 DIAGNOSIS — E78.5 HYPERLIPIDEMIA LDL GOAL <100: Chronic | ICD-10-CM

## 2022-11-30 DIAGNOSIS — M54.40 CHRONIC LOW BACK PAIN WITH SCIATICA, SCIATICA LATERALITY UNSPECIFIED, UNSPECIFIED BACK PAIN LATERALITY: ICD-10-CM

## 2022-11-30 PROCEDURE — 99442 PR PHYSICIAN TELEPHONE EVALUATION 11-20 MIN: CPT | Mod: 95,,, | Performed by: NURSE PRACTITIONER

## 2022-11-30 PROCEDURE — 99442 PR PHYSICIAN TELEPHONE EVALUATION 11-20 MIN: ICD-10-PCS | Mod: 95,,, | Performed by: NURSE PRACTITIONER

## 2022-11-30 RX ORDER — SERTRALINE HYDROCHLORIDE 100 MG/1
200 TABLET, FILM COATED ORAL DAILY
Qty: 180 TABLET | Refills: 2 | Status: SHIPPED | OUTPATIENT
Start: 2022-11-30 | End: 2023-01-30 | Stop reason: SDUPTHER

## 2022-11-30 RX ORDER — LORATADINE 10 MG/1
10 TABLET ORAL DAILY
Qty: 90 TABLET | Refills: 2 | Status: SHIPPED | OUTPATIENT
Start: 2022-11-30 | End: 2024-02-09 | Stop reason: SDUPTHER

## 2022-11-30 RX ORDER — ERGOCALCIFEROL 1.25 MG/1
50000 CAPSULE ORAL
Qty: 12 CAPSULE | Refills: 2 | Status: SHIPPED | OUTPATIENT
Start: 2022-11-30 | End: 2024-01-19 | Stop reason: SDUPTHER

## 2022-11-30 RX ORDER — BACLOFEN 10 MG/1
10 TABLET ORAL 3 TIMES DAILY
Qty: 270 TABLET | Refills: 2 | Status: SHIPPED | OUTPATIENT
Start: 2022-11-30

## 2022-11-30 RX ORDER — ESOMEPRAZOLE MAGNESIUM 40 MG/1
40 CAPSULE, DELAYED RELEASE ORAL
Qty: 90 CAPSULE | Refills: 2 | Status: SHIPPED | OUTPATIENT
Start: 2022-11-30 | End: 2024-02-09 | Stop reason: SDUPTHER

## 2022-11-30 RX ORDER — ALBUTEROL SULFATE 90 UG/1
2 AEROSOL, METERED RESPIRATORY (INHALATION) EVERY 6 HOURS PRN
Qty: 18 G | Refills: 2 | Status: SHIPPED | OUTPATIENT
Start: 2022-11-30

## 2022-11-30 RX ORDER — ALENDRONATE SODIUM 70 MG/1
70 TABLET ORAL WEEKLY
Qty: 12 TABLET | Refills: 2 | Status: SHIPPED | OUTPATIENT
Start: 2022-11-30 | End: 2024-01-19 | Stop reason: SDUPTHER

## 2022-11-30 RX ORDER — ROSUVASTATIN CALCIUM 10 MG/1
10 TABLET, COATED ORAL NIGHTLY
Qty: 90 TABLET | Refills: 2 | Status: SHIPPED | OUTPATIENT
Start: 2022-11-30 | End: 2024-01-19 | Stop reason: SDUPTHER

## 2022-11-30 RX ORDER — PRAMIPEXOLE DIHYDROCHLORIDE 0.5 MG/1
0.5 TABLET ORAL NIGHTLY
Qty: 90 TABLET | Refills: 2 | Status: SHIPPED | OUTPATIENT
Start: 2022-11-30 | End: 2023-10-25 | Stop reason: SDUPTHER

## 2022-11-30 NOTE — PROGRESS NOTES
Subjective:       Patient ID: Eleanor Buckner is a 67 y.o. female.    Chief Complaint: Follow-up, Results, and Shortness of Breath (Scheduled  for echo and ultrasound)    Established Patient - Audio Only Telehealth Visit     The patient location is: home  The chief complaint leading to consultation is: follow up on SOB  Visit type: Virtual visit with audio only (telephone)  Total time spent with patient: 22 minutes    The reason for the audio only service rather than synchronous audio and video virtual visit was related to technical difficulties or patient preference/necessity.    Each patient to whom I provide medical services by telemedicine is:  (1) informed of the relationship between the physician and patient and the respective role of any other health care provider with respect to management of the patient; and (2) notified that they may decline to receive medical services by telemedicine and may withdraw from such care at any time. Patient verbally consented to receive this service via voice-only telephone call.    This service was not originating from a related E/M service provided within the previous 7 days nor will  to an E/M service or procedure within the next 24 hours or my soonest available appointment.  Prevailing standard of care was able to be met in this audio-only visit.          Patient has diagnosis of HLD, GERD, Osteoporosis, Anxiety, Hx of ORIF Right Distal Radius (07/14/2022). Patient seen per audio visit today for follow up on SOB. Patient last seen in clinic on 08/31/2022. CT Chest ordered due to increased SOB, negative. Sleep Study completed on 09/12/2022; recommended CPAP. Patient states she did receive her CPAP and is using it nightly. Patient states Cardiology increased her Lasix to 20 mg daily prn edema. Repeat Echo scheduled on 12/06/2022. Patient seen by Dr. Musa for BLE edema, states has angiogram scheduled.  Patient states no improvement in SOB. PFTs done 2020, normal.  Patient requesting pain management clinic for back pain. Patient currently attending Physical Therapy, states no improvement. CT Lumbar done 12/2012, indicated Conus medullaris is normal; At L1-L2, there is bilateral facet arthropathy, There is minimal bulging of anulus fibrosis, There is no disc herniation or spinal stenosis; At L2-L3, there is moderate bilateral facet arthropathy, There is no  disc herniation or spinal stenosis; At L3-L4, there is prominent bilateral facet arthropathy, There is mild ligamentum flaval hypertrophy, There is no spinal stenosis or  disc herniation; At L4-L5, there is very prominent bilateral facet arthropathy, There is ligamentum flavum hypertrophy, There is minimal bulging of anulus fibrosis, There is no disc herniation or significant appearing spinal stenosis; At L5-S1, there is severe bilateral facet arthropathy and hypertrophy, Mild bilateral upper foraminal stenosis is noted, There is no central stenosis or disc herniation. Patient denies any other acute complaints.      Patient followed by Cardiology Clinic. Last appointment on 11/04/2022. Eleanor Buckner 67 y.o. female with a past medical history of Morbid Obesity and HLD presents for follow up and results of Venous Insuffiencey Studies. RLE venous study on 10.3.22 showed no DVT and no substantial reflux study.  LLE venous study on 9.29.22 showed possible non-occlusive deep vein thrombus but no substantial reflux. Patient completed a Lexiscan Stress Test on 5.13.22 which was abnormal revealing a mild intensity, small sized, fixed perfusion abnormality consistent with scar in the apical wall(s) in the typical distribution of the LAD territory. Patient completed an chocardiogram in November 2021 which revealed an ejection fraction of approximately 55%. See report below. She also completed LINDY testing in November 2021 which revealed no evidence of significant arterial insufficiency. She completed a nuclear stress test on  9.19.19 that revealed no ischemia and no wall motion abnormality. She completed PFTs that revealed no abnormalities. Patient continues to endorse shortness of breath with ambulation as well as bilateral lower extremity edema.  Patient states the shortness of breath has worsened since last visit.  She denies chest pain, palpitations, or syncope.  Patient was recently diagnosed with KI and is compliant with her CPAP nightly. Patient states she is complaint with her current medication regimen. CANO - increased: Lexiscan Stress Test 5.13.22 showed  a mild intensity, small sized, fixed perfusion abnormality consistent with scar in the apical wall(s) in the typical distribution of the LAD territory; EF 55% per Echo 11.2021; EF 55% per Echo 7.3.19; Lexiscan stress test - negative for ischemia with no wall motion abnormalities (9.19.19); PFTs - normal; Will order Echocardiogram for worsening CANO; Counseled patient on weight loss and increased activity as tolerated. HLD: LDL at goal - 84; Continue Crestor 10mg daily; Counseled on low-cholesterol, low-fat diet, and exercise as tolerated. Leg cramping/pain/edema: RLE venous study on 10.3.22 showed no DVT and no substantial reflux study; LLE venous study on 9.29.22 showed possible non-occlusive deep vein thrombus but no substantial reflux; LINDY testing Nov. 2021 - No evidence of significant arterial insufficiency was identified on the study; Will refer to Dr. Young for further evaluation of peripheral edema; Will increase Lasix to 20mg PO Qdprn; Recommend low salt diet, leg elevation, and compression stockings if able. Morbid obesity: Counseled patient on weight loss, heart healthy diet, and increased activity as tolerated. KI: Compliant with home CPAP nightly. Will order Echocardiogram. Will refer to Dr. Young for further evaluation of peripheral edema. Increase Lasix to 20mg PO Qdprn. Follow up in Cardiology Clinic in 3 months. Follow up with PCP as directed. Patient has  follow up appointment scheduled 02/08/2023.     Patient followed by Orthopedic Clinic. Last appointment on 10/12/2022. Eleanor Buckner is a 67 y.o. female who presents for follow up status post ORIF right distal radius 07/14/2022.  Patient last seen 6 weeks ago.  She has been doing very well since that time.  Been working on range of motion and feels that her wrist range of motion is about the same as the other side.  This does not limit her in any way.  She has no pain.  She is able to use her walker without any issue. Eleanor Buckner is a 67 y.o. female seen in the office today for The primary encounter diagnosis was Right wrist pain. A diagnosis of Closed fracture of wrist with routine healing, unspecified laterality, subsequent encounter was also pertinent to this visit..  Date of surgery 07/14/2022. Overall patient doing very well now 12 weeks status post open reduction and internal fixation of right distal radius fracture.  She has no pain and has pretty good range of motion at the wrist.  She is using her arm as needed and has no issues with using her walker.  She would like to follow up as needed at this point. I think this is reasonable and she will call with any issues. ANG KO.     Patient followed by Mental Health Provider, Eleanor Anderson NP. Last appointment on 07/27/2022. Patient presents with her . Patient explains that although she is taking Zoloft 200mg q HS, she is still anxious and has low energy. She gets discouraged because she cannot do the things that she used to do. States that she used to be able to do housework but now she does not have the energy. She stopped doing housework 3 months ago. States that she has been depressed all of her life; since around 8 or 9.   Her mother and father . Her father was an alcoholic. They would argue and she would get nervous. She is still nervous. Worries about everything and every body. States that she stopped doing things 3  months ago because she has a hard time standing up. Her back gets weak. She fell and broke her arm about a month ago and had surgery two weeks ago. One month before breaking her arm, she broke her foot while climbing into the truck. She could not get her leg up high enough to step onto the step to get into the truck. The step broke. In 2012, she fell and had to have shoulder replacement. States that she has been taking Zoloft for about 10 years and it has worked well for her in the past. Has not had a sleep study. States that she does snore at night. Naps off and on during the day. Does not feel that she could keep a mask on. Requested PCP consider a sleep study. Patient admits that she sits in the house a lot because she fears that she is going to fall. Both times that she fell, she reports that she had difficulty lifting her R leg. She fears that she is going to fall again. Explained that the more she sits, the weaker she will become. Strongly encouraged that she requests to go to PT to gain strength and confidence. Will continue the Zoloft 200mg q HS. Add Wellbutrin XL 150mg q day. FU in 3 weeks        Mammogram: 09/12/2022, negative  Pap: deferred due to age  Colonoscopy: 01/13/2020, recommend repeat in 10 years  Bone Density: 04/30/2021, osteoporosis  Medicare Wellness: ordered    Review of Systems   Constitutional: Negative.    HENT: Negative.     Eyes: Negative.    Respiratory:  Positive for shortness of breath.    Cardiovascular: Negative.    Gastrointestinal: Negative.    Endocrine: Negative.    Genitourinary: Negative.    Musculoskeletal: Negative.    Integumentary:  Negative.   Allergic/Immunologic: Negative.    Neurological: Negative.    Hematological: Negative.    Psychiatric/Behavioral: Negative.         Objective:      Physical Exam  Neurological:      Mental Status: She is alert and oriented to person, place, and time.   Psychiatric:         Mood and Affect: Mood normal.       Assessment:        Problem List Items Addressed This Visit          Psychiatric    Anxiety (Chronic)     Followed by Mental Health Provider  Continue Wellbutrin, Zoloft.            Cardiac/Vascular    Hyperlipidemia LDL goal <100 (Chronic)     Continue Rosuvastatin 10 mg Qhs  Weight loss encouraged  Low fat/high fiber diet  Increase physical activity   Latest Reference Range & Units 07/13/22 14:11   Cholesterol <=200 mg/dL 185   HDL 35 - 60 mg/dL 68 (H)   LDL Cholesterol External 50.00 - 140.00 mg/dL 84.00   Total Cholesterol/HDL Ratio 0 - 5  3   Triglycerides 37 - 140 mg/dL 166 (H)   Very Low Density Lipoprotein  33          Relevant Orders    CBC Auto Differential    Comprehensive Metabolic Panel    Lipid Panel    Urinalysis, Reflex to Urine Culture Urine, Clean Catch    Microalbumin/Creatinine Ratio, Urine    Dyspnea on exertion - Primary     CT Chest 09/12/2022: negative  PFTs 2022: normal  Followed by Cardiology            Endocrine    Class 3 severe obesity due to excess calories with serious comorbidity and body mass index (BMI) of 60.0 to 69.9 in adult (Chronic)     BMI: 60.17  TSH: 4.6  Vitamin D level: 17.2  HgbA1c: 5.2  Sleep Study: 09/12/2022, uses CPAP  Weight Loss Encouraged  Increase Physical Activity         Vitamin D deficiency (Chronic)     Vitamin D level: 17.2  Continue Vitamin D 50,000 units weekly         Relevant Orders    Vitamin D    Impaired fasting glucose     Glucose: 127  A1c ordered         Relevant Orders    Hemoglobin A1C       GI    GERD (gastroesophageal reflux disease) (Chronic)     Continue Nexium 40 mg daily  Avoid spicy foods  Remain in an upright position for at least 30 minutes after consuming meals            Orthopedic    Osteoporosis (Chronic)     Calcium level: 9.4  Vitamin D level: 17.2  Continue Fosamax weekly, Vitamin D weekly         Low back pain with sciatica    Relevant Orders    Ambulatory referral/consult to Pain Clinic       Other    Peripheral edema (Chronic)     Continue Lasix  20 mg daily prn         KI on CPAP (Chronic)     Tolerating CPAP well, continue nightly use            Plan:    Patient to follow up in 4 months with labs to be done prior to appointment.

## 2022-12-01 PROBLEM — R60.0 PERIPHERAL EDEMA: Chronic | Status: ACTIVE | Noted: 2022-08-04

## 2022-12-01 PROBLEM — E78.5 HYPERLIPIDEMIA LDL GOAL <100: Chronic | Status: ACTIVE | Noted: 2022-05-31

## 2022-12-01 PROBLEM — G47.33 OSA ON CPAP: Chronic | Status: ACTIVE | Noted: 2022-08-31

## 2022-12-01 PROBLEM — R73.01 IMPAIRED FASTING GLUCOSE: Status: ACTIVE | Noted: 2022-12-01

## 2022-12-01 PROBLEM — M54.40 LOW BACK PAIN WITH SCIATICA: Status: ACTIVE | Noted: 2022-12-01

## 2022-12-01 NOTE — ASSESSMENT & PLAN NOTE
Continue Rosuvastatin 10 mg Qhs  Weight loss encouraged  Low fat/high fiber diet  Increase physical activity   Latest Reference Range & Units 07/13/22 14:11   Cholesterol <=200 mg/dL 185   HDL 35 - 60 mg/dL 68 (H)   LDL Cholesterol External 50.00 - 140.00 mg/dL 84.00   Total Cholesterol/HDL Ratio 0 - 5  3   Triglycerides 37 - 140 mg/dL 166 (H)   Very Low Density Lipoprotein  33

## 2022-12-01 NOTE — ASSESSMENT & PLAN NOTE
BMI: 60.17  TSH: 4.6  Vitamin D level: 17.2  HgbA1c: 5.2  Sleep Study: 09/12/2022, uses CPAP  Weight Loss Encouraged  Increase Physical Activity

## 2022-12-02 DIAGNOSIS — G89.29 CHRONIC LOW BACK PAIN WITH SCIATICA, SCIATICA LATERALITY UNSPECIFIED, UNSPECIFIED BACK PAIN LATERALITY: Primary | ICD-10-CM

## 2022-12-02 DIAGNOSIS — M54.40 CHRONIC LOW BACK PAIN WITH SCIATICA, SCIATICA LATERALITY UNSPECIFIED, UNSPECIFIED BACK PAIN LATERALITY: Primary | ICD-10-CM

## 2022-12-06 ENCOUNTER — HOSPITAL ENCOUNTER (OUTPATIENT)
Dept: CARDIOLOGY | Facility: HOSPITAL | Age: 67
Discharge: HOME | End: 2022-12-06
Attending: NURSE PRACTITIONER
Payer: MEDICARE

## 2022-12-06 VITALS
DIASTOLIC BLOOD PRESSURE: 67 MMHG | SYSTOLIC BLOOD PRESSURE: 149 MMHG | WEIGHT: 293 LBS | BODY MASS INDEX: 48.82 KG/M2 | HEIGHT: 65 IN

## 2022-12-06 DIAGNOSIS — R60.0 BILATERAL LOWER EXTREMITY EDEMA: ICD-10-CM

## 2022-12-06 DIAGNOSIS — R06.09 DYSPNEA ON EXERTION: ICD-10-CM

## 2022-12-06 LAB
AV INDEX (PROSTH): 0.65
AV MEAN GRADIENT: 5 MMHG
AV PEAK GRADIENT: 10 MMHG
AV VALVE AREA: 2.02 CM2
AV VELOCITY RATIO: 0.67
BSA FOR ECHO PROCEDURE: 2.76 M2
DOP CALC AO PEAK VEL: 1.62 M/S
DOP CALC AO VTI: 40.9 CM
DOP CALC LVOT AREA: 3.1 CM2
DOP CALC LVOT DIAMETER: 1.99 CM
DOP CALC LVOT PEAK VEL: 1.08 M/S
DOP CALC LVOT STROKE VOLUME: 82.69 CM3
DOP CALC MV VTI: 22.5 CM
DOP CALCLVOT PEAK VEL VTI: 26.6 CM
E WAVE DECELERATION TIME: 181.51 MSEC
E/A RATIO: 1.05
EJECTION FRACTION: 55 %
HR MV ECHO: 68 BPM
LEFT ATRIUM SIZE: 4.4 CM
LV LATERAL E/E' RATIO: 8.2 M/S
LVOT MG: 2.37 MMHG
LVOT MV: 0.72 CM/S
MV MEAN GRADIENT: 1 MMHG
MV PEAK A VEL: 0.78 M/S
MV PEAK E VEL: 0.82 M/S
MV PEAK GRADIENT: 4 MMHG
MV STENOSIS PRESSURE HALF TIME: 52.64 MS
MV VALVE AREA BY CONTINUITY EQUATION: 3.68 CM2
MV VALVE AREA P 1/2 METHOD: 4.18 CM2
PISA MRMAX VEL: 3.21 M/S
PISA TR MAX VEL: 2.87 M/S
TDI LATERAL: 0.1 M/S
TR MAX PG: 33 MMHG

## 2022-12-06 PROCEDURE — 93306 TTE W/DOPPLER COMPLETE: CPT

## 2022-12-28 NOTE — PROGRESS NOTES
Subjective:      Patient ID: Eleanor Buckner is a 67 y.o. female.    Chief Complaint: Referral (Referral from NP Patience Conley for chronic low back pain with sciatica. Taking Tylenol for pain. Pain is a 10/10 on worse day. States legs are weak with numbness in the right leg; she has pain in her buttocks. States she has tried exercise for the pain but it makes it worse./)    Referred by: Patience Conley, FNP     HPI: Patient presents as new consult for for chronic low back pain with sciatica. Pain present for 15-20 years ago. She was first evaluated by Dr. Zaragoza and her PCP. Dr Zaragoza gave patient lumbar WHIT with great relief. Received 3 WHIT with pain control X 1 year. Denies back surgeries. Patient states left knee is not able to fully extend. No instability. No work up with Ortho for knee ROM. Reports falling twice this year. No numbness to feet, - peripheral neuropathy or DMII.      Pain located to bilateral mid low back with sharp pain radiating to bilateral buttocks and numbness from right buttock to right lateral leg and stops at knee. No numbness or pain radiating to left leg. Left buttock feels heavy at times. Pain worse with standing, walking, washing dishes. Pain does not wake her up at night. Pain interferes with ADLs and dressing. Requires husbands assistance. Sitting does not replicate pain.     Score rating 10/10 on worst day Completed  Physical therapy X 8weeks (completed in October 2022). This was repeated in December. Reports PT helped a little. Pain is still pronounced and legs are weak. Longer distances require her Rollator or wheelchair. Pain improved sitting and laying down w/0/10 pain with these activies.       H: Anxiety, hyperlipidemia, dyspnea on exertion, severe obesity, vitamin-D deficiency, impaired fasting glucose, GERD, osteoporosis, closed fracture of right distal radius, muscle weakness, bilateral chronic knee pain, low back pain with sciatica, peripheral edema, KI on CPAP,  unsteadiness on feet, history of multiple falls, bilateral lower extremity edema.    Prior pain medications included tramadol,Norco 10/325, Baclofen 10 mg 2 times a day She no longer takes gabapentin, Tramadol or Norco as they were ineffective.     Pertinent Labs 2022:  Normal GFR + Crea + platelets and neutrophils    No Lumbar MRI on file in [facility] or [facility]    CT Lumbar Spine 2021:   At L1-L2, there is bilateral facet arthropathy. There is minimal  bulging of anulus fibrosis. There is no disc herniation or spinal  stenosis.     At L2-L3, there is moderate bilateral facet arthropathy. There is no  disc herniation or spinal stenosis.     At L3-L4, there is prominent bilateral facet arthropathy. There is  mild ligamentum flaval hypertrophy. There is no spinal stenosis or  disc herniation.     At L4-L5, there is very prominent bilateral facet arthropathy. There  is ligamentum flavum hypertrophy. There is minimal bulging of anulus  fibrosis. There is no disc herniation or significant appearing spinal  stenosis.     At L5-S1, there is severe bilateral facet arthropathy and hypertrophy.  Mild bilateral upper foraminal stenosis is noted. There is no central  stenosis or disc herniation.     IMPRESSION:     Multilevel spondylosis.    Pain medications include tramadol 50 mg every 6 hours as needed, Norco 10/325 every 6 hours as needed, tried gabapentin in the past does not take it any longer.  Baclofen 10 mg 3 times a day.  Recent MRI lumbar spine in [facility]      Past Medical history includes anxiety, hyperlipidemia, class 3 severe obesity, vitamin-D deficiency, impaired fasting glucose, GERD, osteoporosis, closed fracture right distal radius, muscle weakness, bilateral chronic knee pain, low back pain with sciatica, multiple falls, bilateral peripheral edema, KI on CPAP.        ROS  As noted in HPI      Objective:          Physical Exam    General: Morbidlly overweight; A&O x 3; No anxiety/depression; NAD  Mental Status:  Oriented to person, palce and time. Displays appropriate mood & affect.  Head: Norm cephalic and atraumatic  Neck: full ROM without deficits bilateral movements Land R, flexion and extension. No cervical paraspinal banding.   Eyes: normal conjunctiva, normal lids, normal pupils  ENT and mouth: normal external ear, nose, and no lesions noted on the lips.  Respiratory: Symmetrical, Unlabored  Abdomen: Pendulous    Extremities:  Gen: No cyanosis or tenderness to palpation bilateral LE  Skin: Warm, pink, dry, no rashes, no lesions on the lumbar spine  Strength: 5/5 motor strength bilateral LE  ROM: Full ROM in right knee. Left knee weakness. Bilateral ankles without pain or instability.    Neuro:  Gait: no altalgic lean, normal toe and heel raise  DTR's: 2+ in bilateral patellar, and ankle  Sensory: Intact to light touch bilateral lower extremities    Spine:notable tenderness to palpation over bilateral low back and buttocks. .    L-spine ROM: Full ROM to flexion, Limited ROM and pain with extension, bilateral rotation    Straight Leg Raise: Negative bilaterally  SI Joint: No tenderness to palpation bilaterally,  Sensory: Intact to light touch bilateral lower extremities          Encounter Diagnoses   Name Primary?    Chronic low back pain with sciatica, sciatica laterality unspecified, unspecified back pain laterality Yes    Dorsalgia, unspecified     Lumbar radiculopathy, chronic          Plan:       Eleanor was seen today for referral.    Diagnoses and all orders for this visit:    Chronic low back pain with sciatica, sciatica laterality unspecified, unspecified back pain laterality  -     Ambulatory referral/consult to Pain Clinic  -     MRI Lumbar Spine Without Contrast; Future    Dorsalgia, unspecified  -     MRI Lumbar Spine Without Contrast; Future    Lumbar radiculopathy, chronic  -     MRI Lumbar Spine Without Contrast; Future       Pt is claustraphobic;However, she is able to obtain MRI's if she goes in feet  first. Current weight 360 lbs (5 foot 2 inches)  MRI [facility]mb[facility] Spine ordered [facility] off [address]  Follow up in 3 weeks to go over MRI Lumbar Spine results and plan of care.     Patient to purchase Voltaren Gel 1% and use up to 4 times a day.

## 2022-12-29 ENCOUNTER — OFFICE VISIT (OUTPATIENT)
Dept: ORTHOPEDICS | Facility: CLINIC | Age: 67
End: 2022-12-29
Payer: MEDICARE

## 2022-12-29 VITALS
HEIGHT: 65 IN | SYSTOLIC BLOOD PRESSURE: 122 MMHG | WEIGHT: 293 LBS | BODY MASS INDEX: 48.82 KG/M2 | DIASTOLIC BLOOD PRESSURE: 79 MMHG | HEART RATE: 78 BPM

## 2022-12-29 DIAGNOSIS — M54.9 DORSALGIA, UNSPECIFIED: ICD-10-CM

## 2022-12-29 DIAGNOSIS — M54.16 LUMBAR RADICULOPATHY, CHRONIC: ICD-10-CM

## 2022-12-29 DIAGNOSIS — M54.40 CHRONIC LOW BACK PAIN WITH SCIATICA, SCIATICA LATERALITY UNSPECIFIED, UNSPECIFIED BACK PAIN LATERALITY: Primary | ICD-10-CM

## 2022-12-29 DIAGNOSIS — G89.29 CHRONIC LOW BACK PAIN WITH SCIATICA, SCIATICA LATERALITY UNSPECIFIED, UNSPECIFIED BACK PAIN LATERALITY: Primary | ICD-10-CM

## 2022-12-29 PROCEDURE — 99204 PR OFFICE/OUTPT VISIT, NEW, LEVL IV, 45-59 MIN: ICD-10-PCS | Mod: ,,, | Performed by: NURSE PRACTITIONER

## 2022-12-29 PROCEDURE — 99204 OFFICE O/P NEW MOD 45 MIN: CPT | Mod: ,,, | Performed by: NURSE PRACTITIONER

## 2022-12-29 RX ORDER — POTASSIUM CHLORIDE 750 MG/1
10 TABLET, EXTENDED RELEASE ORAL DAILY PRN
COMMUNITY
Start: 2022-11-22

## 2022-12-29 RX ORDER — AMLODIPINE BESYLATE 2.5 MG/1
2.5 TABLET ORAL 2 TIMES DAILY
COMMUNITY
Start: 2022-12-27

## 2022-12-29 RX ORDER — RANOLAZINE 500 MG/1
500 TABLET, EXTENDED RELEASE ORAL 2 TIMES DAILY
COMMUNITY
Start: 2022-12-27

## 2022-12-29 RX ORDER — DEXBROMPHENIRAMINE MALEATE 2 MG/1
1 TABLET ORAL
COMMUNITY
Start: 2022-03-19 | End: 2024-02-09

## 2022-12-29 NOTE — PATIENT INSTRUCTIONS
purchase Voltaren Gel 1% and use up to 4 times a day for low back pain    Obtain MRI [facility] Spine before your follow up visit in 3 weeks.   I will go over results and plan of care with you diuring vist.

## 2023-01-09 ENCOUNTER — HOSPITAL ENCOUNTER (OUTPATIENT)
Dept: RADIOLOGY | Facility: HOSPITAL | Age: 68
Discharge: HOME | End: 2023-01-09
Attending: NURSE PRACTITIONER
Payer: MEDICARE

## 2023-01-09 DIAGNOSIS — M54.40 CHRONIC LOW BACK PAIN WITH SCIATICA, SCIATICA LATERALITY UNSPECIFIED, UNSPECIFIED BACK PAIN LATERALITY: ICD-10-CM

## 2023-01-09 DIAGNOSIS — M54.16 LUMBAR RADICULOPATHY, CHRONIC: ICD-10-CM

## 2023-01-09 DIAGNOSIS — G89.29 CHRONIC LOW BACK PAIN WITH SCIATICA, SCIATICA LATERALITY UNSPECIFIED, UNSPECIFIED BACK PAIN LATERALITY: ICD-10-CM

## 2023-01-09 DIAGNOSIS — M54.9 DORSALGIA, UNSPECIFIED: ICD-10-CM

## 2023-01-09 PROCEDURE — 72148 MRI LUMBAR SPINE W/O DYE: CPT | Mod: TC

## 2023-01-09 RX ORDER — BUPROPION HYDROCHLORIDE 150 MG/1
150 TABLET ORAL DAILY
Qty: 30 TABLET | Refills: 3 | Status: SHIPPED | OUTPATIENT
Start: 2023-01-09 | End: 2023-01-30 | Stop reason: SDUPTHER

## 2023-01-19 ENCOUNTER — OFFICE VISIT (OUTPATIENT)
Dept: PAIN MEDICINE | Facility: CLINIC | Age: 68
End: 2023-01-19
Payer: MEDICARE

## 2023-01-19 DIAGNOSIS — M54.40 CHRONIC LOW BACK PAIN WITH SCIATICA, SCIATICA LATERALITY UNSPECIFIED, UNSPECIFIED BACK PAIN LATERALITY: Primary | ICD-10-CM

## 2023-01-19 DIAGNOSIS — M54.16 LUMBAR RADICULOPATHY, CHRONIC: ICD-10-CM

## 2023-01-19 DIAGNOSIS — M47.816 LUMBAR SPONDYLOSIS: ICD-10-CM

## 2023-01-19 DIAGNOSIS — M54.9 DORSALGIA, UNSPECIFIED: ICD-10-CM

## 2023-01-19 DIAGNOSIS — G89.29 CHRONIC LOW BACK PAIN WITH SCIATICA, SCIATICA LATERALITY UNSPECIFIED, UNSPECIFIED BACK PAIN LATERALITY: Primary | ICD-10-CM

## 2023-01-19 PROCEDURE — 99215 PR OFFICE/OUTPT VISIT, EST, LEVL V, 40-54 MIN: ICD-10-PCS | Mod: ,,, | Performed by: NURSE PRACTITIONER

## 2023-01-19 PROCEDURE — 99215 OFFICE O/P EST HI 40 MIN: CPT | Mod: ,,, | Performed by: NURSE PRACTITIONER

## 2023-01-19 NOTE — PATIENT INSTRUCTIONS
No Aspirin, Advil, Aleve, Anaprox or Celebrex one week prior to Medial Brach Block to Right L4/L5 and L5/s1

## 2023-01-19 NOTE — PROGRESS NOTES
Subjective:      Patient ID: Eleanor Buckner is a 67 y.o. female.    Chief Complaint: Follow-up (F/U for MRI results. States no pain today. )    Referred by: No ref. provider found     HPI: Patient presents as a follow-up for results of her MRI lumbar spine and pain control after using Voltaren gel. Stated she forgot to purchase Voltaren gel. She currently treats pain with Tylenol.     Current back pain located to bilateral lower back, radiates to bilateral buttocks with R > L buttock. Radiates down right leg down to lateral thigh and stops at right  knee. Attributes right leg pain to numbness. No pain on left leg. No numbness to feet. No diabetes history. + osteoporosis (treated with vitamin D and Fosamax.)    History of prior lumbar WHIT's in 2012 with noted pain relief.       Past Medical history includes anxiety, hyperlipidemia, class 3 severe obesity, vitamin-D deficiency, impaired fasting glucose, GERD, osteoporosis, closed fracture right distal radius, muscle weakness, bilateral chronic knee pain, low back pain with sciatica, multiple falls, bilateral peripheral edema, KI on CPAP.    Pertinent Labs 2022:  Normal GFR + Crea + platelets and neutrophils    MRI lumbar spine 01/09/2023:   FINDINGS:  For the purpose of this report, the most inferior well developed intervertebral disc space is presumed to represent L5-S1. Lumbar vertebrae stature is unremarkable alignment is preserved.  There is a meningioma which involves T11 vertebral body.  There are no acute marrow edematous signals.  The visualized thoracic cord is unremarkable. The conus medullaris terminates at L1-L2.  Disc segmental analysis is given below:     At L1-L2, disc is unremarkable.  There is moderate central canal stenosis which is caused by facet arthropathy.  There are no narrowings of the neural foramen.     At L2-L3, disc is unremarkable.  Central canal is not stenosed and there are no narrowings of the neural foramen     At L3-L4, there  is broad disc bulge which mildly compresses the ventral thecal sac.  Bilateral facet arthropathy and ligamentum flavum thickening.  These findings combine to cause moderate central canal stenosis.  There are no narrowings of the neural foramen.     At L4-L5, there is disc bulge which slightly flattens the ventral thecal sac.  There is ligamentum flavum thickening and facet arthropathy.  These findings result in moderate central canal stenosis.  There are no narrowings of the neural foramen.     At L5-S1, there is bulging of annulus fibrosis which slightly flattens the ventral thecal sac bilateral facet arthropathy.  There is no significant central canal stenosis.  There also no significant narrowings of the neural foramen     Impression:     Lumbar degenerative disc disease and spondylosis level by level discussed above.    Interventional Pain History      ROS  Back pain      Objective:          Physical Exam  General: Morbidlly overweight; A&O x 3; No anxiety/depression; NAD  Mental Status: Oriented to person, palce and time. Displays appropriate mood & affect.  Head: Norm cephalic and atraumatic  Neck: full ROM without deficits bilateral movements Land R, flexion and extension. No cervical paraspinal banding.   Eyes: normal conjunctiva, normal lids, normal pupils  ENT and mouth: normal external ear, nose, and no lesions noted on the lips.  Respiratory: Symmetrical, Unlabored  Abdomen: Pendulous     Extremities:  Gen: No cyanosis or tenderness to palpation bilateral LE  Skin: Warm, pink, dry, no rashes, no lesions on the lumbar spine  Strength: 5/5 motor strength bilateral LE  ROM: Full ROM in right knee. Left knee weakness. Bilateral ankles without pain or instability.     Neuro:  Gait: unabno altalgic lean, normal toe and heel raise  DTR's: 2+ in bilateral patellar, and ankle  Sensory: Intact to light touch bilateral lower extremities     Spine:notable tenderness to palpation over bilateral low back and buttocks.  .     L-spine ROM: Full ROM to flexion, Limited ROM and pain with extension, bilateral rotation. Left leg weaness with 3/5 strength . Right leg 5/5     Straight Leg Raise: Negative bilaterally  SI Joint: No tenderness to palpation bilaterally,  Sensory: Intact to light touch bilateral lower extremities         Assessment:       Encounter Diagnoses   Name Primary?    Chronic low back pain with sciatica, sciatica laterality unspecified, unspecified back pain laterality Yes    Dorsalgia, unspecified     Lumbar radiculopathy, chronic     Lumbar spondylosis          Plan:       Eleanor was seen today for follow-up.    Diagnoses and all orders for this visit:    Chronic low back pain with sciatica, sciatica laterality unspecified, unspecified back pain laterality    Dorsalgia, unspecified    Lumbar radiculopathy, chronic    Lumbar spondylosis     Bilateral MBB Right L4/L5 & L5/S1  No ASA or NSAIDS  Follow up with me post op.     Follow up with PCP about left leg weakness (X 1 year)

## 2023-01-23 DIAGNOSIS — M54.16 LUMBAR RADICULOPATHY, CHRONIC: Primary | ICD-10-CM

## 2023-01-23 DIAGNOSIS — M47.816 LUMBAR SPONDYLOSIS: ICD-10-CM

## 2023-01-25 ENCOUNTER — HOSPITAL ENCOUNTER (EMERGENCY)
Facility: HOSPITAL | Age: 68
Discharge: HOME | End: 2023-01-25
Attending: FAMILY MEDICINE
Payer: MEDICARE

## 2023-01-25 VITALS
SYSTOLIC BLOOD PRESSURE: 138 MMHG | TEMPERATURE: 98 F | OXYGEN SATURATION: 98 % | DIASTOLIC BLOOD PRESSURE: 80 MMHG | HEART RATE: 68 BPM | HEIGHT: 62 IN | BODY MASS INDEX: 53.92 KG/M2 | RESPIRATION RATE: 22 BRPM | WEIGHT: 293 LBS

## 2023-01-25 DIAGNOSIS — M25.562 ACUTE PAIN OF LEFT KNEE: Primary | ICD-10-CM

## 2023-01-25 PROCEDURE — 25000003 PHARM REV CODE 250

## 2023-01-25 PROCEDURE — 99283 EMERGENCY DEPT VISIT LOW MDM: CPT

## 2023-01-25 RX ORDER — TRAMADOL HYDROCHLORIDE 50 MG/1
50 TABLET ORAL
Status: COMPLETED | OUTPATIENT
Start: 2023-01-25 | End: 2023-01-25

## 2023-01-25 RX ORDER — TRAMADOL HYDROCHLORIDE 50 MG/1
50 TABLET ORAL EVERY 8 HOURS PRN
Qty: 12 TABLET | Refills: 0 | Status: SHIPPED | OUTPATIENT
Start: 2023-01-25 | End: 2023-01-28

## 2023-01-25 RX ADMIN — TRAMADOL HYDROCHLORIDE 50 MG: 50 TABLET, COATED ORAL at 01:01

## 2023-01-25 NOTE — ED PROVIDER NOTES
Encounter Date: 1/25/2023       History     Chief Complaint   Patient presents with    Knee Pain     Left knee pain times four days denies fall or injury      Knee pain patient comes in with left-sided knee pain for 4 days denies any fall injury patient otherwise has no nausea no vomiting no diarrhea no fever chills night sweats no other complaints patient is allergic to NSAIDs      Review of patient's allergies indicates:   Allergen Reactions    Nsaids (non-steroidal anti-inflammatory drug) Nausea And Vomiting     Past Medical History:   Diagnosis Date    Depression     Hyperlipemia      Past Surgical History:   Procedure Laterality Date    APPENDECTOMY      ARTHROPLASTY OF SHOULDER      CHOLECYSTECTOMY      COLONOSCOPY  01/13/2020    ESOPHAGOGASTRODUODENOSCOPY  06/10/2016    FRACTURE SURGERY  06-    HEMORRHOID SURGERY  03/23/2016    JOINT REPLACEMENT  03-    OPEN REDUCTION AND INTERNAL FIXATION (ORIF) OF FRACTURE OF DISTAL RADIUS Right 07/14/2022    Procedure: ORIF, FRACTURE, RADIUS, DISTAL;  Surgeon: Emery Morel MD;  [facility];  Service: Orthopedics;  Laterality: Right;    PROCTOSCOPY  03/23/2016     Family History   Problem Relation Age of Onset    Heart failure Mother     Heart disease Mother     Osteoporosis Sister     Osteoporosis Brother     Gout Brother     Arthritis Father      Social History     Tobacco Use    Smoking status: Never    Smokeless tobacco: Never    Tobacco comments:     None   Substance Use Topics    Alcohol use: Never    Drug use: Never     Review of Systems   Constitutional:  Negative for fever.   HENT:  Negative for sore throat.    Respiratory:  Negative for shortness of breath.    Cardiovascular:  Negative for chest pain.   Gastrointestinal:  Negative for nausea.   Genitourinary:  Negative for dysuria.   Musculoskeletal:  Positive for arthralgias, joint swelling and myalgias. Negative for back pain.   Skin:  Negative for rash.   Neurological:  Negative for  weakness.   Hematological:  Does not bruise/bleed easily.     Physical Exam     Initial Vitals [01/25/23 1055]   BP Pulse Resp Temp SpO2   138/80 68 18 97.8 °F (36.6 °C) 98 %      MAP       --         Physical Exam    Nursing note and vitals reviewed.  Constitutional: She appears well-developed.   HENT:   Head: Normocephalic and atraumatic.   Right Ear: External ear normal.   Left Ear: External ear normal.   Nose: Nose normal.   Mouth/Throat: Oropharynx is clear and moist. No oropharyngeal exudate.   Eyes: Conjunctivae and EOM are normal. Pupils are equal, round, and reactive to light. Right eye exhibits no discharge. Left eye exhibits no discharge.   Neck: Neck supple. No tracheal deviation present. No JVD present.   Normal range of motion.  Cardiovascular:  Normal rate, regular rhythm, normal heart sounds and intact distal pulses.     Exam reveals no gallop and no friction rub.       No murmur heard.  Pulmonary/Chest: Breath sounds normal. No stridor. No respiratory distress. She has no wheezes. She has no rhonchi. She has no rales.   Abdominal: Abdomen is soft. Bowel sounds are normal. She exhibits no distension and no mass. There is no abdominal tenderness. There is no rebound and no guarding.   Musculoskeletal:         General: Tenderness present. Normal range of motion.      Cervical back: Normal range of motion and neck supple.     Neurological: She is alert and oriented to person, place, and time. She has normal strength. No cranial nerve deficit.   Skin: Skin is warm and dry. No rash and no abscess noted. No erythema.   Psychiatric: She has a normal mood and affect. Her behavior is normal. Judgment and thought content normal.       ED Course   Procedures  Labs Reviewed - No data to display       Imaging Results              X-Ray Knee Complete 4 or More Views Left (Final result)  Result time 01/25/23 11:52:21   Procedure changed from X-Ray Knee 1 or 2 View Left     Final result by Lore Roberts MD  (01/25/23 11:52:21)                   Impression:      1. No acute abnormality identified.  2. Mild degenerative changes of the knee.      Electronically signed by: Lore Roberts  Date:    01/25/2023  Time:    11:52               Narrative:    EXAMINATION:  XR KNEE COMP 4 OR MORE VIEWS LEFT    CLINICAL HISTORY:  pain;    COMPARISON:  None.    FINDINGS:  There is no acute fracture or malalignment.  There are tricompartmental osteophytes.  There is no knee joint effusion.  The soft tissues are unremarkable.                                       Medications   traMADoL tablet 50 mg (50 mg Oral Given 1/25/23 1302)     Medical Decision Making:   Differential Diagnosis:   Patient has knee pain gout arthritis osteoarthritis edema                        Clinical Impression:   Final diagnoses:  [M25.562] Acute pain of left knee (Primary)        ED Disposition Condition    Discharge Stable          ED Prescriptions       Medication Sig Dispense Start Date End Date Auth. Provider    traMADoL (ULTRAM) 50 mg tablet Take 1 tablet (50 mg total) by mouth every 8 (eight) hours as needed for Pain. 12 tablet 1/25/2023 1/28/2023 Lucina Saldaña NP          Follow-up Information       Follow up With Specialties Details Why Contact Info    Shmuel Lai MD Orthopedic Surgery Schedule an appointment as soon as possible for a visit   [address] [place] [address]  [phone]      [facility]   [address] [place] [id number] [phone]             Erick Carrera MD  01/26/23 5745

## 2023-01-25 NOTE — ED PROVIDER NOTES
Encounter Date: 1/25/2023       History     Chief Complaint   Patient presents with    Knee Pain     Left knee pain times four days denies fall or injury      Awake alert oriented in no acute distress 67-year-old  female with complaints of left knee pain for 2 days.  Patient denies any injury.  Difficulty with weight-bearing.    The history is provided by the patient. No  was used.   Review of patient's allergies indicates:   Allergen Reactions    Nsaids (non-steroidal anti-inflammatory drug) Nausea And Vomiting     Past Medical History:   Diagnosis Date    Depression     Hyperlipemia      Past Surgical History:   Procedure Laterality Date    APPENDECTOMY      ARTHROPLASTY OF SHOULDER      CHOLECYSTECTOMY      COLONOSCOPY  01/13/2020    ESOPHAGOGASTRODUODENOSCOPY  06/10/2016    FRACTURE SURGERY  06-    HEMORRHOID SURGERY  03/23/2016    JOINT REPLACEMENT  03-    OPEN REDUCTION AND INTERNAL FIXATION (ORIF) OF FRACTURE OF DISTAL RADIUS Right 07/14/2022    Procedure: ORIF, FRACTURE, RADIUS, DISTAL;  Surgeon: Emery Morel MD;  Location: [facility];  Service: Orthopedics;  Laterality: Right;    PROCTOSCOPY  03/23/2016     Family History   Problem Relation Age of Onset    Heart failure Mother     Heart disease Mother     Osteoporosis Sister     Osteoporosis Brother     Gout Brother     Arthritis Father      Social History     Tobacco Use    Smoking status: Never    Smokeless tobacco: Never    Tobacco comments:     None   Substance Use Topics    Alcohol use: Never    Drug use: Never     Review of Systems   Constitutional: Negative.    HENT: Negative.     Eyes: Negative.    Respiratory: Negative.     Cardiovascular: Negative.    Gastrointestinal: Negative.    Endocrine: Negative.    Genitourinary: Negative.    Musculoskeletal:  Positive for joint swelling.   Skin: Negative.    Allergic/Immunologic: Negative.    Neurological: Negative.    Hematological: Negative.     Psychiatric/Behavioral: Negative.     All other systems reviewed and are negative.    Physical Exam     Initial Vitals [01/25/23 1055]   BP Pulse Resp Temp SpO2   138/80 68 18 97.8 °F (36.6 °C) 98 %      MAP       --         Physical Exam    Constitutional: She appears well-developed and well-nourished.   HENT:   Head: Normocephalic and atraumatic.   Right Ear: External ear normal.   Left Ear: External ear normal.   Nose: Nose normal.   Mouth/Throat: Oropharynx is clear and moist.   Eyes: Conjunctivae and EOM are normal. Pupils are equal, round, and reactive to light.   Neck: Neck supple.   Normal range of motion.  Cardiovascular:  Normal rate, regular rhythm, normal heart sounds and intact distal pulses.           Pulmonary/Chest: Breath sounds normal.   Abdominal: Abdomen is soft. Bowel sounds are normal.   Musculoskeletal:         General: Tenderness present.      Cervical back: Normal range of motion and neck supple.      Comments: Pain and tenderness over the left ACL.  No bruising or excessive swelling noted     Skin: Skin is warm and dry.   Psychiatric: She has a normal mood and affect.       ED Course   Procedures  Labs Reviewed - No data to display       Imaging Results              X-Ray Knee Complete 4 or More Views Left (Final result)  Result time 01/25/23 11:52:21   Procedure changed from X-Ray Knee 1 or 2 View Left     Final result by Lore Roberts MD (01/25/23 11:52:21)                   Impression:      1. No acute abnormality identified.  2. Mild degenerative changes of the knee.      Electronically signed by: Lore Roberts  Date:    01/25/2023  Time:    11:52               Narrative:    EXAMINATION:  XR KNEE COMP 4 OR MORE VIEWS LEFT    CLINICAL HISTORY:  pain;    COMPARISON:  None.    FINDINGS:  There is no acute fracture or malalignment.  There are tricompartmental osteophytes.  There is no knee joint effusion.  The soft tissues are unremarkable.                                        Medications   traMADoL tablet 50 mg (has no administration in time range)     Medical Decision Making:   Initial Assessment:   Awake alert oriented no acute distress  female 67 years old with complaints of left knee pain onset 2 days ago.  Patient describes the pain as sharp and increased with movement.  Patient states that she woke this morning the pain was worse she has difficulty weight-bearing.  Patient denies injury, pain primarily to the left ACL.  Passive range of motion +.  Distal pulse present  Differential Diagnosis:   ACL tear  Joint effusion   Dislocation  Clinical Tests:   Radiological Study: Ordered  Other:   I discussed test(s) with the performing physician.       <> Summary of the Findings: Pt to follow up with orthopedics for steroid injection.  Tramadol for pain.  Keep knee elevated, ice twice a day.                         Clinical Impression:   Final diagnoses:  [M25.562] Acute pain of left knee (Primary)        ED Disposition Condition    Discharge Stable          ED Prescriptions       Medication Sig Dispense Start Date End Date Auth. Provider    traMADoL (ULTRAM) 50 mg tablet Take 1 tablet (50 mg total) by mouth every 8 (eight) hours as needed for Pain. 12 tablet 1/25/2023 1/28/2023 Lucina Saldaña NP          Follow-up Information       Follow up With Specialties Details Why Contact Info    Shmuel Lai MD Orthopedic Surgery Schedule an appointment as soon as possible for a visit   [facility] [place] [id number] [phone]      [facility]   [address] [place] [id number] [phone]             Lucina Saldaña NP  01/25/23 5026

## 2023-01-25 NOTE — DISCHARGE INSTRUCTIONS
Follow up with Dr. Lai or [facility] for further treatment.  Elevate, ice and rest.

## 2023-01-26 ENCOUNTER — OFFICE VISIT (OUTPATIENT)
Dept: ORTHOPEDICS | Facility: CLINIC | Age: 68
End: 2023-01-26
Payer: MEDICARE

## 2023-01-26 VITALS
WEIGHT: 293 LBS | DIASTOLIC BLOOD PRESSURE: 80 MMHG | BODY MASS INDEX: 53.92 KG/M2 | HEART RATE: 69 BPM | SYSTOLIC BLOOD PRESSURE: 129 MMHG | HEIGHT: 62 IN

## 2023-01-26 DIAGNOSIS — M17.12 PRIMARY OSTEOARTHRITIS OF LEFT KNEE: ICD-10-CM

## 2023-01-26 DIAGNOSIS — M25.562 LEFT KNEE PAIN, UNSPECIFIED CHRONICITY: Primary | ICD-10-CM

## 2023-01-26 PROCEDURE — 20610 LARGE JOINT ASPIRATION/INJECTION: L KNEE: ICD-10-PCS | Mod: LT,,, | Performed by: ORTHOPAEDIC SURGERY

## 2023-01-26 PROCEDURE — 99214 PR OFFICE/OUTPT VISIT, EST, LEVL IV, 30-39 MIN: ICD-10-PCS | Mod: 25,,, | Performed by: ORTHOPAEDIC SURGERY

## 2023-01-26 PROCEDURE — 99214 OFFICE O/P EST MOD 30 MIN: CPT | Mod: 25,,, | Performed by: ORTHOPAEDIC SURGERY

## 2023-01-26 PROCEDURE — 20610 DRAIN/INJ JOINT/BURSA W/O US: CPT | Mod: LT,,, | Performed by: ORTHOPAEDIC SURGERY

## 2023-01-26 RX ORDER — LIDOCAINE HYDROCHLORIDE 20 MG/ML
3 INJECTION, SOLUTION INFILTRATION; PERINEURAL
Status: DISCONTINUED | OUTPATIENT
Start: 2023-01-26 | End: 2023-01-26 | Stop reason: HOSPADM

## 2023-01-26 RX ORDER — BETAMETHASONE SODIUM PHOSPHATE AND BETAMETHASONE ACETATE 3; 3 MG/ML; MG/ML
12 INJECTION, SUSPENSION INTRA-ARTICULAR; INTRALESIONAL; INTRAMUSCULAR; SOFT TISSUE
Status: DISCONTINUED | OUTPATIENT
Start: 2023-01-26 | End: 2023-01-26 | Stop reason: HOSPADM

## 2023-01-26 RX ADMIN — LIDOCAINE HYDROCHLORIDE 3 MG: 20 INJECTION, SOLUTION INFILTRATION; PERINEURAL at 10:01

## 2023-01-26 RX ADMIN — BETAMETHASONE SODIUM PHOSPHATE AND BETAMETHASONE ACETATE 12 MG: 3; 3 INJECTION, SUSPENSION INTRA-ARTICULAR; INTRALESIONAL; INTRAMUSCULAR; SOFT TISSUE at 10:01

## 2023-01-26 NOTE — PROGRESS NOTES
"Subjective:    CC: Pain of the Left Knee (Left knee pain. Been hurting for about a week. Pt just woke up and it was hurting, wasn't the result of an injury. Can walk a little bit with her walker but not much. Knee cracks at times when she moves it. Requesting cortisone injection.)       HPI:  Patient comes in today for her 1st visit.  Patient complains of left knee pain for about a week.  She states it just started hurting.  She denies any fall or specific injury.  She is had injections in the past.  She is a minimal ambulator she is presently with family, she does ambulate with a walker.    ROS: Refer to HPI for pertinent ROS. All other 12 point systems negative.    Objective:  Vitals:    01/26/23 1029   BP: 129/80   Pulse: 69   Weight: (!) 163.3 kg (360 lb)   Height: 5' 2" (1.575 m)        Physical Exam:  The patient is well-nourished, well-developed and in no apparent distress, pleasant and cooperative. Examination of the left lower extremity compartments are soft and warm. Skin is intact. There are no signs or symptoms of DVT or infection. There is a small joint effusion. There is no erythema. Tender to palpation along the mediolateral joint line , left knee range of motion is 5-95. The knee is stable to exam with varus and valgus stressing. Negative anterior and posterior drawer. Negative Lachman´s.  Negative Carlos's test. Patella grind is positive, Negative for apprehension. Neurovascularly intact distally.    Images: . Images Reviewed and discussed with patient.    Assessment:  1. Left knee pain, unspecified chronicity    2. Primary osteoarthritis of left knee  - Large Joint Aspiration/Injection: L knee        Plan:  At this time we discussed her physical exam and previous imaging findings.  We have discussed her underlying arthritic changes.  She tolerated the steroid injection very well to left knee, we have discussed quad strengthening low-impact activities, like see back in 4 weeks to see how she is " progressing.  She will continue with a walker.    Follow UP: No follow-ups on file.    Large Joint Aspiration/Injection: L knee    Date/Time: 1/26/2023 10:00 AM  Performed by: Shmuel Lai MD  Authorized by: Shmuel Lai MD     Consent Done?:  Yes (Verbal)  Indications:  Pain  Site marked: the procedure site was marked    Prep: patient was prepped and draped in usual sterile fashion    Approach:  Anterolateral  Location:  Knee  Site:  L knee  Medications:  12 mg betamethasone acetate-betamethasone sodium phosphate 6 mg/mL; 3 mg LIDOcaine HCL 20 mg/ml (2%) 20 mg/mL (2 %)  Patient tolerance:  Patient tolerated the procedure well with no immediate complications

## 2023-01-26 NOTE — PROGRESS NOTES
"Subjective:    CC: Pain of the Left Knee (Left knee pain. Been hurting for about a week. Pt just woke up and it was hurting, wasn't the result of an injury. Can walk a little bit with her walker but not much. Knee cracks at times when she moves it. Requesting cortisone injection.)       HPI: ***    ROS: Refer to HPI for pertinent ROS. All other 12 point systems negative.    Objective:  Vitals:    01/26/23 1029   BP: 129/80   Pulse: 69   Weight: (!) 163.3 kg (360 lb)   Height: 5' 2" (1.575 m)        Physical Exam:***    Images: ***. Images Reviewed and discussed with patient.    Assessment:  1. Left knee pain, unspecified chronicity    2. Primary osteoarthritis of left knee  - Large Joint Aspiration/Injection: L knee        Plan: ***    Follow UP: No follow-ups on file.    Large Joint Aspiration/Injection: L knee    Date/Time: 1/26/2023 10:00 AM  Performed by: Shmuel Lai MD  Authorized by: Shmuel Lai MD     Consent Done?:  Yes (Verbal)  Indications:  Pain  Site marked: the procedure site was marked    Prep: patient was prepped and draped in usual sterile fashion    Approach:  Anterolateral  Location:  Knee  Site:  L knee  Medications:  12 mg betamethasone acetate-betamethasone sodium phosphate 6 mg/mL; 3 mg LIDOcaine HCL 20 mg/ml (2%) 20 mg/mL (2 %)  Patient tolerance:  Patient tolerated the procedure well with no immediate complications           "

## 2023-01-26 NOTE — PROCEDURES
Large Joint Aspiration/Injection: L knee    Date/Time: 1/26/2023 10:00 AM  Performed by: Shmuel Lai MD  Authorized by: Shmuel Lai MD     Consent Done?:  Yes (Verbal)  Indications:  Pain  Site marked: the procedure site was marked    Prep: patient was prepped and draped in usual sterile fashion    Approach:  Anterolateral  Location:  Knee  Site:  L knee  Medications:  12 mg betamethasone acetate-betamethasone sodium phosphate 6 mg/mL; 3 mg LIDOcaine HCL 20 mg/ml (2%) 20 mg/mL (2 %)  Patient tolerance:  Patient tolerated the procedure well with no immediate complications

## 2023-01-30 ENCOUNTER — OFFICE VISIT (OUTPATIENT)
Dept: INTERNAL MEDICINE | Facility: CLINIC | Age: 68
End: 2023-01-30
Payer: MEDICARE

## 2023-01-30 DIAGNOSIS — F41.9 ANXIETY: Primary | Chronic | ICD-10-CM

## 2023-01-30 DIAGNOSIS — F32.A MILD DEPRESSION: Chronic | ICD-10-CM

## 2023-01-30 DIAGNOSIS — G47.9 DIFFICULTY SLEEPING: Chronic | ICD-10-CM

## 2023-01-30 PROCEDURE — 99443 PR PHYSICIAN TELEPHONE EVALUATION 21-30 MIN: ICD-10-PCS | Mod: 95,,, | Performed by: NURSE PRACTITIONER

## 2023-01-30 PROCEDURE — 99443 PR PHYSICIAN TELEPHONE EVALUATION 21-30 MIN: CPT | Mod: 95,,, | Performed by: NURSE PRACTITIONER

## 2023-01-30 RX ORDER — TRAZODONE HYDROCHLORIDE 50 MG/1
TABLET ORAL
Qty: 30 TABLET | Refills: 11 | Status: SHIPPED | OUTPATIENT
Start: 2023-01-30 | End: 2023-09-19 | Stop reason: DRUGHIGH

## 2023-01-30 RX ORDER — BUPROPION HYDROCHLORIDE 150 MG/1
150 TABLET ORAL DAILY
Qty: 30 TABLET | Refills: 3 | Status: SHIPPED | OUTPATIENT
Start: 2023-01-30 | End: 2023-03-17 | Stop reason: DRUGHIGH

## 2023-01-30 RX ORDER — SERTRALINE HYDROCHLORIDE 100 MG/1
200 TABLET, FILM COATED ORAL DAILY
Qty: 180 TABLET | Refills: 2 | Status: SHIPPED | OUTPATIENT
Start: 2023-01-30 | End: 2023-09-19 | Stop reason: DRUGHIGH

## 2023-01-30 NOTE — PROGRESS NOTES
Established Patient - Audio Only Telehealth Visit     The patient location is: home  The chief complaint leading to consultation is: depression, anxiety, med management  Visit type: Virtual visit with audio only (telephone)  Total time spent with patient: 30min    The reason for the audio only service rather than synchronous audio and video virtual visit was related to technical difficulties or patient preference/necessity.     Each patient to whom I provide medical services by telemedicine is:  (1) informed of the relationship between the physician and patient and the respective role of any other health care provider with respect to management of the patient; and (2) notified that they may decline to receive medical services by telemedicine and may withdraw from such care at any time. Patient verbally consented to receive this service via voice-only telephone call.    This service was not originating from a related E/M service provided within the previous 7 days nor will  to an E/M service or procedure within the next 24 hours or my soonest available appointment.  Prevailing standard of care was able to be met in this audio-only visit.      Follow-up #1  01/30/2023  HPI: 66yo WFref[facility] by PCP [facility]  PMHx: HLD, GERD, Osteoporosis, Anxiety.    On her initial visit, she was to continue the Zoloft 200mg q HS and Wellbutrin XL 150mg q day was added.  She did not make her FU appointment.    Today, She states that she feels that the Wellbutrin has been helpful.  She had a sleep study and now has a CPAP.   She states that she is having trouble getting used to the CPAP. She has had it for about 3 months.   When she turns, the CPAP will make a noise and she wakes up and cannot go back to sleep.   She takes two 10mg Melatonin at night.  Believes that she is getting about 6 hours of sleep a night: 3 hours in her bed with the CPA and then 3 hours in her recliner without the  CPAP.     She feels tired during the day.   States that if she could get sleep, she would feel better.     She is taking the Zoloft 200mg q HS and the Wellbutrin XL 150mg q day.   States that she is still anxious because she gets short of breath while trying to walk. She fears that she will fall again.     Will add Trazodone 50mg q HS.     PHQ score:  01/30/2023: 8? - mild to moderate  07/27/2022: 16 - moderately severe    NELY-7:  01/30/2023: 6  mild  07/27/2022: 14 - moderate anxiety    Mental Status Evaluation:  Appearance:  casually dressed, neatly groomed, obese, seated in wheelchair   Behavior:  cooperative; restless   Speech:  no latency; no press   Mood:  anxious   Affect:  guarded, mood-congruent   Thought Process:  normal and logical   Thought Content:  normal, no suicidality, no homicidality, delusions, or paranoia   Sensorium:  grossly intact   Cognition:  grossly intact   Insight:  intact   Judgment:  behavior is adequate to circumstances     Impression:  1. Depression - mod severe  2. Anxiety - moderate  3. Difficulty sleeping  4. May need PT    Plan:  1. Continue Zoloft 200mg q HS  2. Continue Wellbutrin XL 150mg q day  3. Start Trazodone 25-50mg as needed for insomnia  4. FU in 6 weeks      Initial Interview 07/27/2022  HPI: 68yo WFreferred by PCP to the [facility] anxiety  PMHx: HLD, GERD, Osteoporosis, Anxiety.    Patient presents with her .   Patient explains that although she is taking Zoloft 200mg q HS, she is still anxious and has low energy. She gets discouraged because she cannot do the things that she used to do.   States that she used to be able to do housework but now she does not have the energy. She stopped doing housework 3 months ago.   States that she has been depressed all of her life; since around 8 or 9.   Her mother and father . Her father was an alcoholic. They would argue and she would get nervous. She is still nervous. Worries about  everything and every body.     States that she stopped doing things 3 months ago because she has a hard time standing up. Her back gets weak.   She fell and broke her arm about a month ago and had surgery two weeks ago.   One month before breaking her arm, she broke her foot while climbing into the truck. She could not get her leg up high enough to step onto the step to get into the truck. The step broke.    In 2012, she fell and had to have shoulder replacement.     States that she has been taking Zoloft for about 10 years and it has worked well for her in the past.     Has not had a sleep study.   States that she does snore at night. Naps off and on during the day.   Does not feel that she could keep a mask on.   Requested PCP consider a sleep study.     Patient admits that she sits in the house a lot because she fears that she is going to fall. Both times that she fell, she reports that she had difficulty lifting her R leg. She fears that she is going to fall again.   Explained that the more she sits, the weaker she will become.  Strongly encouraged that she requests to go to PT to gain strength and confidence.     Will continue the Zoloft 200mg q HS.  Add Wellbutrin XL 150mg q day   FU in 3 weeks      Psychiatric History:   Reports a history of: depression and anxiety  History of mental health out-patient treatment:  History of in-patient psychiatric hospitalization: denies  History of suicidal ideations: denies  History of suicidal threats:  History of suicide attempts: denies  History of self mutilation: denies    History of psychotropic medications:   Strattera 40mg q day  Zoloft   Wellbutrin    Family Psychiatric History:  Mental Illness: denies  Alcohol abuse/addiction: father  Drug addiction:    Substance Use History:  Hx of addiction or abuse: denies  Alcohol: denies  Amphetamines: denies  Benzodiazepines: denies  Cocaine: denies  Opiates: recent Rx for Noco after breaking her arm  Marijuana:  denies  Tobacco: denies  Caffeine:    Social History:  Grew up in: [place]  Raised by: mother  Number of siblings: one sister and two brothers  Education: 9th grade  Sexual identity: heterosexual  Marital status:  x 51 years  Number of children: 4 adult sons  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    PHQ score:  07/27/2022: 16 - moderately severe  Over the last two weeks how often have you been bothered by little interest or pleasure in doing things Nearly every day   Over the last two weeks how often have you been bothered by feeling down, depressed or hopeless Nearly every day   Over the last two weeks how often have you been bothered by trouble falling or staying asleep, or sleeping too much Nearly every day     Over the last two weeks how often have you been bothered by feeling tired or having little energy Nearly every day   Over the last two weeks how often have you been bothered by a poor appetite or overeating Not at all   Over the last two weeks how often have you been bothered by feeling bad about yourself - or that you are a failure or have let yourself or your family down Several days   Over the last two weeks how often have you been bothered by trouble concentrating on things, such as reading the newspaper or watching television Not at all   Over the last two weeks how often have you been bothered by moving or speaking so slowly that other people could have noticed. Or the opposite - being so fidgety or restless that you have been moving around a lot more than usual. Nearly every day   Over the last two weeks how often have you  been bothered by thoughts that you would be better off dead, or of hurting yourself Not at all   If you checked off any problems, how difficult have these problems made it for you to do your work, take care of things at home or get along with other people? Very difficult   PHQ-9 Score 16   PHQ-9 Interpretation Moderately Severe     NELY-7:  07/27/2022: 14 - moderate anxiety  1. Feeling nervous, anxious, or on edge? Nearly everyday   2. Not being able to stop or control worrying? Nearly everyday   3. Worrying too much about different things? Nearly everyday   4. Trouble relaxing? Nearly everyday   5. Being so restless that it is hard to sit still? Not at all   6. Becoming easily annoyed or irritable? Several days   7. Feeling afraid as if something awful might happen? Several days   8. If you checked off any problems, how difficult have these problems made it for you to do your work, take care of things at home, or get along with other people? Very difficult   NELY-7 Score 14   Number answered (out of first 7) 7   Interpretation Moderate Anxiety     Mental Status Evaluation:  Appearance:  casually dressed, neatly groomed, obese, seated in wheelchair   Behavior:  cooperative; restless   Speech:  no latency; no press   Mood:  anxious   Affect:  guarded, mood-congruent   Thought Process:  normal and logical   Thought Content:  normal, no suicidality, no homicidality, delusions, or paranoia   Sensorium:  grossly intact   Cognition:  grossly intact   Insight:  intact   Judgment:  behavior is adequate to circumstances     Impression:  1. Depression - mod severe  2. Anxiety - moderate  3. Difficulty sleeping  4. May need PT    Plan:  1. Zoloft 200mg q HS  2. Wellbutrin XL 150mg q day  3. Sleep study???  4. FU in 3 weeks

## 2023-02-07 ENCOUNTER — TELEPHONE (OUTPATIENT)
Dept: ORTHOPEDICS | Facility: CLINIC | Age: 68
End: 2023-02-07
Payer: MEDICARE

## 2023-02-07 NOTE — TELEPHONE ENCOUNTER
Patient called stating that the injection was working until she felt a pop in the back of her knee. Patient is wondering if a brace would help or if further imaging would be needed.           [phone]

## 2023-02-20 ENCOUNTER — ANESTHESIA EVENT (OUTPATIENT)
Dept: SURGERY | Facility: HOSPITAL | Age: 68
End: 2023-02-20
Payer: MEDICARE

## 2023-02-21 ENCOUNTER — PATIENT MESSAGE (OUTPATIENT)
Dept: ADMINISTRATIVE | Facility: OTHER | Age: 68
End: 2023-02-21
Payer: MEDICARE

## 2023-02-22 ENCOUNTER — ANESTHESIA (OUTPATIENT)
Dept: SURGERY | Facility: HOSPITAL | Age: 68
End: 2023-02-22
Payer: MEDICARE

## 2023-02-22 ENCOUNTER — HOSPITAL ENCOUNTER (OUTPATIENT)
Facility: HOSPITAL | Age: 68
Discharge: HOME | End: 2023-02-22
Attending: ANESTHESIOLOGY | Admitting: ANESTHESIOLOGY
Payer: MEDICARE

## 2023-02-22 DIAGNOSIS — M54.9 CHRONIC BACK PAIN GREATER THAN 3 MONTHS DURATION: ICD-10-CM

## 2023-02-22 DIAGNOSIS — G89.29 CHRONIC BACK PAIN GREATER THAN 3 MONTHS DURATION: ICD-10-CM

## 2023-02-22 PROCEDURE — 25000003 PHARM REV CODE 250: Performed by: ANESTHESIOLOGY

## 2023-02-22 PROCEDURE — 64493 PR INJ DX/THER AGNT PARAVERT FACET JOINT,IMG GUIDE,LUMBAR/SAC,1ST LVL: ICD-10-PCS | Mod: RT,,, | Performed by: ANESTHESIOLOGY

## 2023-02-22 PROCEDURE — 37000008 HC ANESTHESIA 1ST 15 MINUTES: Performed by: ANESTHESIOLOGY

## 2023-02-22 PROCEDURE — 64493 INJ PARAVERT F JNT L/S 1 LEV: CPT | Mod: RT,,, | Performed by: ANESTHESIOLOGY

## 2023-02-22 PROCEDURE — 64494 INJ PARAVERT F JNT L/S 2 LEV: CPT | Mod: RT,,, | Performed by: ANESTHESIOLOGY

## 2023-02-22 PROCEDURE — 63600175 PHARM REV CODE 636 W HCPCS

## 2023-02-22 PROCEDURE — 25000003 PHARM REV CODE 250

## 2023-02-22 PROCEDURE — 64494 PR INJ DX/THER AGNT PARAVERT FACET JOINT,IMG GUIDE,LUMBAR/SAC, 2ND LEVEL: ICD-10-PCS | Mod: RT,,, | Performed by: ANESTHESIOLOGY

## 2023-02-22 PROCEDURE — 64494 INJ PARAVERT F JNT L/S 2 LEV: CPT | Mod: RT | Performed by: ANESTHESIOLOGY

## 2023-02-22 PROCEDURE — 64493 INJ PARAVERT F JNT L/S 1 LEV: CPT | Mod: RT | Performed by: ANESTHESIOLOGY

## 2023-02-22 RX ORDER — BUPIVACAINE HYDROCHLORIDE 2.5 MG/ML
INJECTION, SOLUTION EPIDURAL; INFILTRATION; INTRACAUDAL
Status: DISCONTINUED
Start: 2023-02-22 | End: 2023-02-22 | Stop reason: HOSPADM

## 2023-02-22 RX ORDER — BUPIVACAINE HYDROCHLORIDE 2.5 MG/ML
INJECTION, SOLUTION EPIDURAL; INFILTRATION; INTRACAUDAL
Status: DISCONTINUED | OUTPATIENT
Start: 2023-02-22 | End: 2023-02-22 | Stop reason: HOSPADM

## 2023-02-22 RX ORDER — SODIUM CHLORIDE, SODIUM GLUCONATE, SODIUM ACETATE, POTASSIUM CHLORIDE AND MAGNESIUM CHLORIDE 30; 37; 368; 526; 502 MG/100ML; MG/100ML; MG/100ML; MG/100ML; MG/100ML
INJECTION, SOLUTION INTRAVENOUS CONTINUOUS
Status: DISCONTINUED | OUTPATIENT
Start: 2023-02-22 | End: 2023-02-22 | Stop reason: HOSPADM

## 2023-02-22 RX ORDER — TRIAMCINOLONE ACETONIDE 40 MG/ML
INJECTION, SUSPENSION INTRA-ARTICULAR; INTRAMUSCULAR
Status: DISCONTINUED
Start: 2023-02-22 | End: 2023-02-22 | Stop reason: WASHOUT

## 2023-02-22 RX ORDER — LIDOCAINE HCL/PF 100 MG/5ML
SYRINGE (ML) INTRAVENOUS
Status: DISCONTINUED | OUTPATIENT
Start: 2023-02-22 | End: 2023-02-22

## 2023-02-22 RX ORDER — LIDOCAINE HYDROCHLORIDE 10 MG/ML
INJECTION, SOLUTION EPIDURAL; INFILTRATION; INTRACAUDAL; PERINEURAL
Status: DISCONTINUED | OUTPATIENT
Start: 2023-02-22 | End: 2023-02-22 | Stop reason: HOSPADM

## 2023-02-22 RX ORDER — LIDOCAINE HYDROCHLORIDE 10 MG/ML
INJECTION, SOLUTION EPIDURAL; INFILTRATION; INTRACAUDAL; PERINEURAL
Status: DISCONTINUED
Start: 2023-02-22 | End: 2023-02-22 | Stop reason: HOSPADM

## 2023-02-22 RX ORDER — PROPOFOL 10 MG/ML
INJECTION, EMULSION INTRAVENOUS
Status: DISCONTINUED | OUTPATIENT
Start: 2023-02-22 | End: 2023-02-22

## 2023-02-22 RX ORDER — LIDOCAINE HYDROCHLORIDE 10 MG/ML
1 INJECTION, SOLUTION EPIDURAL; INFILTRATION; INTRACAUDAL; PERINEURAL ONCE
Status: DISCONTINUED | OUTPATIENT
Start: 2023-02-22 | End: 2023-02-22 | Stop reason: HOSPADM

## 2023-02-22 RX ADMIN — LIDOCAINE HYDROCHLORIDE 25 MG: 20 INJECTION INTRAVENOUS at 10:02

## 2023-02-22 RX ADMIN — PROPOFOL 50 MG: 10 INJECTION, EMULSION INTRAVENOUS at 10:02

## 2023-02-22 RX ADMIN — PROPOFOL 70 MG: 10 INJECTION, EMULSION INTRAVENOUS at 10:02

## 2023-02-22 RX ADMIN — PROPOFOL 30 MG: 10 INJECTION, EMULSION INTRAVENOUS at 10:02

## 2023-02-22 NOTE — TRANSFER OF CARE
"Anesthesia Transfer of Care Note    Patient: Eleanor Buckner    Procedure(s) Performed: Procedure(s) (LRB):  Block-nerve-medial branch-lumbar (Right)    Patient location: OPS    Anesthesia Type: general    Transport from OR: Transported from OR on room air with adequate spontaneous ventilation    Post pain: adequate analgesia    Post assessment: no apparent anesthetic complications and tolerated procedure well    Post vital signs: stable    Nausea/Vomiting: no nausea/vomiting    Complications: none    Transfer of care protocol was followed      Last vitals:   Visit Vitals  BP (!) 130/56   Pulse 67   Temp 36.3 °C (97.3 °F) (Tympanic)   Resp 20   Ht 5' 2" (1.575 m)   Wt (!) 168.3 kg (371 lb)   SpO2 95%   Breastfeeding No   BMI 67.86 kg/m²     "

## 2023-02-22 NOTE — ANESTHESIA PREPROCEDURE EVALUATION
"                                                                                                             02/22/2023  Eleanor Buckner is a 67 y.o., female who presents with chronic back pain and radiculopathy.  Diagnosis:        Lumbar radiculopathy, chronic [M54.16]       Lumbar spondylosis [M47.816]    The pt. Comes to [facility] for the noted pain management procedure under IV Sedation / TIVA w/ Local  Procedure:   Block-nerve-medial branch-lumbar (Right) - L4/L5 and L5/S1      PMHx:  Other Medical History   Hyperlipemia Depression   Sleep apnea, unspecified Hiatal hernia     Surgical History  COLONOSCOPY ESOPHAGOGASTRODUODENOSCOPY   PROCTOSCOPY ARTHROPLASTY OF SHOULDER   APPENDECTOMY CHOLECYSTECTOMY   OPEN REDUCTION AND INTERNAL FIXATION (ORIF) OF FRACTURE OF DISTAL RADIUS FRACTURE SURGERY   JOINT REPLACEMENT            Vital signs:  Pre Vitals     Current as of 02/22/23 0652  No BP, pulse, respiration, SpO2, or temperature recorded.  Height: 5' 2" (1.575 m) (02/08/23) Weight: 163.3 kg (360 lb) (02/08/23)   BMI: 65.8 IBW: 50.1 kg (110 lb 7.8 oz)           Lab Data:        Pre-op Assessment    I have reviewed the Patient Summary Reports.     I have reviewed the Nursing Notes. I have reviewed the NPO Status.   I have reviewed the Medications.     Review of Systems  Anesthesia Hx:  No problems with previous Anesthesia    Social:  Non-Smoker    Hematology/Oncology:  Hematology Normal   Oncology Normal     EENT/Dental:EENT/Dental Normal   Cardiovascular:   Exercise tolerance: good Hypertension hyperlipidemia  Functional Capacity good / => 4 METS    Pulmonary:   Shortness of breath Sleep Apnea    Renal/:  Renal/ Normal     Hepatic/GI:   Hiatal Hernia, GERD    Musculoskeletal:   Chronic back pain   Spondylosis  Radiculopathy Spine Disorders:    Neurological:   Neuromuscular Disease,    Endocrine:  Endocrine Normal  Morbid Obesity / BMI > 40  Dermatological:  Skin Normal    Psych:   Psychiatric History depression "          Physical Exam  General: Alert, Oriented, Well nourished and Cooperative    Airway:  Mallampati: II   Mouth Opening: Normal  TM Distance: Normal  Tongue: Normal  Neck ROM: Normal ROM    Dental:  Intact    Chest/Lungs:  Clear to auscultation, Normal Respiratory Rate    Heart:  Rate: Normal  Rhythm: Regular Rhythm        Anesthesia Plan  Type of Anesthesia, risks & benefits discussed:    Anesthesia Type: MAC, Gen Natural Airway  Intra-op Monitoring Plan: Standard ASA Monitors  Post Op Pain Control Plan: IV/PO Opioids PRN  Induction:  IV  ASA Score: 3  Day of Surgery Review of History & Physical: H&P Update referred to the surgeon/provider.    Ready For Surgery From Anesthesia Perspective.     .

## 2023-02-22 NOTE — DISCHARGE SUMMARY
[facility] - Periop Services  Discharge Note  Short Stay    Procedure(s) (LRB):  Block-nerve-medial branch-lumbar (Right)      OUTCOME: Patient tolerated treatment/procedure well without complication and is now ready for discharge.    DISPOSITION: Home or Self Care    FINAL DIAGNOSIS:  <principal problem not specified>    FOLLOWUP: In clinic    DISCHARGE INSTRUCTIONS:  No discharge procedures on file.     TIME SPENT ON DISCHARGE: 5 minutes

## 2023-02-22 NOTE — H&P
Subjective:       Patient ID: Eleanor Buckner is a 67 y.o. female.     Chief Complaint: Follow-up (F/U for MRI results. States no pain today. )     Referred by: No ref. provider found      HPI: Patient presents as a follow-up for results of her MRI lumbar spine and pain control after using Voltaren gel. Stated she forgot to purchase Voltaren gel. She currently treats pain with Tylenol.      Current back pain located to bilateral lower back, radiates to bilateral buttocks with R > L buttock. Radiates down right leg down to lateral thigh and stops at right  knee. Attributes right leg pain to numbness. No pain on left leg. No numbness to feet. No diabetes history. + osteoporosis (treated with vitamin D and Fosamax.)     History of prior lumbar WHIT's in 2012 with noted pain relief.         Past Medical history includes anxiety, hyperlipidemia, class 3 severe obesity, vitamin-D deficiency, impaired fasting glucose, GERD, osteoporosis, closed fracture right distal radius, muscle weakness, bilateral chronic knee pain, low back pain with sciatica, multiple falls, bilateral peripheral edema, KI on CPAP.     Pertinent Labs 2022:  Normal GFR + Crea + platelets and neutrophils    MRI lumbar spine 01/09/2023:   FINDINGS:  For the purpose of this report, the most inferior well developed intervertebral disc space is presumed to represent L5-S1. Lumbar vertebrae stature is unremarkable alignment is preserved.  There is a meningioma which involves T11 vertebral body.  There are no acute marrow edematous signals.  The visualized thoracic cord is unremarkable. The conus medullaris terminates at L1-L2.  Disc segmental analysis is given below:     At L1-L2, disc is unremarkable.  There is moderate central canal stenosis which is caused by facet arthropathy.  There are no narrowings of the neural foramen.     At L2-L3, disc is unremarkable.  Central canal is not stenosed and there are no narrowings of the neural foramen     At  L3-L4, there is broad disc bulge which mildly compresses the ventral thecal sac.  Bilateral facet arthropathy and ligamentum flavum thickening.  These findings combine to cause moderate central canal stenosis.  There are no narrowings of the neural foramen.     At L4-L5, there is disc bulge which slightly flattens the ventral thecal sac.  There is ligamentum flavum thickening and facet arthropathy.  These findings result in moderate central canal stenosis.  There are no narrowings of the neural foramen.     At L5-S1, there is bulging of annulus fibrosis which slightly flattens the ventral thecal sac bilateral facet arthropathy.  There is no significant central canal stenosis.  There also no significant narrowings of the neural foramen     Impression:     Lumbar degenerative disc disease and spondylosis level by level discussed above.     Interventional Pain History        ROS  Back pain      Objective:         Physical Exam  General: Morbidlly overweight; A&O x 3; No anxiety/depression; NAD  Mental Status: Oriented to person, palce and time. Displays appropriate mood & affect.  Head: Norm cephalic and atraumatic  Neck: full ROM without deficits bilateral movements Land R, flexion and extension. No cervical paraspinal banding.   Eyes: normal conjunctiva, normal lids, normal pupils  ENT and mouth: normal external ear, nose, and no lesions noted on the lips.  Respiratory: Symmetrical, Unlabored  Abdomen: Pendulous     Extremities:  Gen: No cyanosis or tenderness to palpation bilateral LE  Skin: Warm, pink, dry, no rashes, no lesions on the lumbar spine  Strength: 5/5 motor strength bilateral LE  ROM: Full ROM in right knee. Left knee weakness. Bilateral ankles without pain or instability.     Neuro:  Gait: unabno altalgic lean, normal toe and heel raise  DTR's: 2+ in bilateral patellar, and ankle  Sensory: Intact to light touch bilateral lower extremities     Spine:notable tenderness to palpation over bilateral low  back and buttocks. .     L-spine ROM: Full ROM to flexion, Limited ROM and pain with extension, bilateral rotation. Left leg weaness with 3/5 strength . Right leg 5/5     Straight Leg Raise: Negative bilaterally  SI Joint: No tenderness to palpation bilaterally,  Sensory: Intact to light touch bilateral lower extremities         Assessment:            Encounter Diagnoses   Name Primary?    Chronic low back pain with sciatica, sciatica laterality unspecified, unspecified back pain laterality Yes    Dorsalgia, unspecified      Lumbar radiculopathy, chronic      Lumbar spondylosis            Plan:       Eleanor was seen today for follow-up.     Diagnoses and all orders for this visit:     Chronic low back pain with sciatica, sciatica laterality unspecified, unspecified back pain laterality     Dorsalgia, unspecified     Lumbar radiculopathy, chronic     Lumbar spondylosis      Bilateral MBB Right L4/L5 & L5/S1

## 2023-02-22 NOTE — ANESTHESIA POSTPROCEDURE EVALUATION
Anesthesia Post Evaluation    Patient: Eleanor Buckner    Procedure(s) Performed: Procedure(s) (LRB):  Block-nerve-medial branch-lumbar (Right)    Final Anesthesia Type: MAC      Patient location during evaluation: OPS  Patient participation: Yes- Able to Participate  Level of consciousness: awake and alert and oriented  Post-procedure vital signs: reviewed and stable  Pain management: adequate  Airway patency: patent    PONV status at discharge: No PONV, vomiting (controlled) and nausea (controlled)  Anesthetic complications: no      Cardiovascular status: stable and blood pressure returned to baseline  Respiratory status: unassisted  Hydration status: euvolemic            Vitals Value Taken Time   /67 02/22/23 1120   Temp 36.3 °C (97.3 °F) 02/22/23 1120   Pulse 79 02/22/23 1120   Resp 20 02/22/23 1120   SpO2 95 % 02/22/23 1120         No case tracking events are documented in the log.      Pain/Nicko Score: Modified Nicko Score: 20 (2/22/2023 11:05 AM)

## 2023-02-22 NOTE — OP NOTE
Right L3-5 diagnostic medial branch blocks    Pre-Procedure Diagnoses:  1. Chronic pain syndrome  2. Low back pain  3. Lumbar facet arthropathy    Post-Procedure Diagnoses:  1. Chronic pain syndrome  2. Low back pain  3. Lumbar facet arthropathy    Anesthesia:  Local and MAC    Estimated Blood Loss:  None    Complications:  None    Informed Consent:  The procedure, risks, benefits, and alternatives were discussed with the patient. There were no contraindications to the procedure. The patient expressed understanding and agreed to proceed. Fully informed written consent was obtained.     Description of the Procedure:  The patient was taken to the operating room. IV access was obtained prior to the start of the procedure. The patient was positioned prone on the fluoroscopy table. Continuous hemodynamic monitoring was initiated and continued throughout the duration of the procedure. The skin overlying the lumbosacral spine was prepped with Chloraprep and draped into a sterile field. Fluoroscopy was used to identify the locations of the right side L3, L4, and L5 medial branch nerves at the junctions of the superior articular processes and transverse processes of L4, L5, and the sacral ala, respectively. Skin anesthesia was achieved using 0.5 mL of 1% lidocaine over each injection site. A 22-gauge 5-inch Quinke spinal needle was slowly inserted and advanced under intermittent fluoroscopic guidance until it made bony contact at the target sites. Proper needle position was confirmed under AP, oblique, and lateral fluoroscopic views. Negative aspiration for blood or CSF was confirmed. Then 0.5 mL of 0.25% bupivacaine was easily injected at each level. There was no pain on injection. The needles were removed intact and bleeding was nil.  Sterile bandages were applied. The patient was taken to the recovery room for further observation in stable condition. The patient was then discharged home without any complications.

## 2023-02-23 ENCOUNTER — OFFICE VISIT (OUTPATIENT)
Dept: ORTHOPEDICS | Facility: CLINIC | Age: 68
End: 2023-02-23
Payer: MEDICARE

## 2023-02-23 VITALS
SYSTOLIC BLOOD PRESSURE: 127 MMHG | HEIGHT: 62 IN | BODY MASS INDEX: 53.92 KG/M2 | RESPIRATION RATE: 20 BRPM | OXYGEN SATURATION: 95 % | WEIGHT: 293 LBS | HEART RATE: 79 BPM | TEMPERATURE: 97 F | DIASTOLIC BLOOD PRESSURE: 67 MMHG

## 2023-02-23 VITALS
BODY MASS INDEX: 53.92 KG/M2 | HEIGHT: 62 IN | DIASTOLIC BLOOD PRESSURE: 71 MMHG | HEART RATE: 72 BPM | WEIGHT: 293 LBS | SYSTOLIC BLOOD PRESSURE: 115 MMHG

## 2023-02-23 DIAGNOSIS — M17.12 OSTEOARTHRITIS OF LEFT KNEE, UNSPECIFIED OSTEOARTHRITIS TYPE: Primary | ICD-10-CM

## 2023-02-23 PROCEDURE — 99213 OFFICE O/P EST LOW 20 MIN: CPT | Mod: ,,, | Performed by: ORTHOPAEDIC SURGERY

## 2023-02-23 PROCEDURE — 99213 PR OFFICE/OUTPT VISIT, EST, LEVL III, 20-29 MIN: ICD-10-PCS | Mod: ,,, | Performed by: ORTHOPAEDIC SURGERY

## 2023-02-23 NOTE — PROGRESS NOTES
"Subjective:    CC: Knee Pain (LT knee injection 01/26/23 follow up. injection helped but 3 days later she was walking and felt a pop. complaints of pain and unsure of swelling. )       HPI:  Patient returns today for repeat evaluation of left knee.  Status post steroid injection on 01/26/2023.  She states the injection helped for about 3 days until she was walking and felt a pop in the back of her knee.  She denies any trauma.  She states she continues to have lot of pain about the posterior knee.  Denies any swelling at the time of the event.  Currently taking Tylenol as she can not take NSAIDs due to history of gastric issues.  She presents in a wheelchair today but states she normally uses a walker.    ROS: Refer to HPI for pertinent ROS. All other 12 point systems negative.    Objective:  Vitals:    02/23/23 1310   BP: 115/71   Pulse: 72   Weight: (!) 167.8 kg (370 lb)   Height: 5' 2" (1.575 m)        Physical Exam:The patient is well-nourished, well-developed and in no apparent distress, pleasant and cooperative. Examination of the left lower extremity compartments are soft and warm. Skin is intact. There are no signs or symptoms of DVT or infection. There is a small joint effusion. There is no erythema. Tender to palpation along posterior knee, no palpable Baker's cyst, left knee range of motion is 5-95. The knee is stable to exam with varus and valgus stressing. Negative anterior and posterior drawer. Negative Lachman´s.  Negative Carlos's test. Patella grind is positive, Negative for apprehension. Neurovascularly intact distally.     Images: . Images Reviewed and discussed with patient.      Assessment:  1. Osteoarthritis of left knee, unspecified osteoarthritis type  - HME - OTHER        Plan:  Physical exam and previous imaging findings discussed with patient.  I believe she had a flare-up of her underlying arthritis had possibly ruptured a Baker's cyst.  Too soon for repeat injection today.  Patient is " not a good surgical candidate due to elevated BMI.  We have discussed this today.  She will continue low-impact activities and Tylenol as tolerated.  I would like see the patient back in 2 months at this time    Follow UP: Follow up in about 2 months (around 4/23/2023).

## 2023-03-08 ENCOUNTER — OFFICE VISIT (OUTPATIENT)
Dept: PAIN MEDICINE | Facility: CLINIC | Age: 68
End: 2023-03-08
Payer: MEDICARE

## 2023-03-08 VITALS
WEIGHT: 293 LBS | TEMPERATURE: 98 F | HEIGHT: 62 IN | HEART RATE: 66 BPM | DIASTOLIC BLOOD PRESSURE: 70 MMHG | BODY MASS INDEX: 53.92 KG/M2 | SYSTOLIC BLOOD PRESSURE: 128 MMHG

## 2023-03-08 DIAGNOSIS — M54.16 LUMBAR RADICULOPATHY, CHRONIC: ICD-10-CM

## 2023-03-08 DIAGNOSIS — M47.816 LUMBAR SPONDYLOSIS: Primary | ICD-10-CM

## 2023-03-08 PROCEDURE — 99215 OFFICE O/P EST HI 40 MIN: CPT | Mod: ,,, | Performed by: NURSE PRACTITIONER

## 2023-03-08 PROCEDURE — 99215 PR OFFICE/OUTPT VISIT, EST, LEVL V, 40-54 MIN: ICD-10-PCS | Mod: ,,, | Performed by: NURSE PRACTITIONER

## 2023-03-08 NOTE — PROGRESS NOTES
Subjective:      Patient ID: Eleanor Buckner is a 67 y.o. female.    Chief Complaint: Back Pain (Follow up from injections.  Pain at 7/10 . Tylenol not helping.  Pain radiated to right leg. /)    Referred by: No ref. provider found     HPI: Patient presents as a follow-up for pain status post postop right L4-L5 and L5-S1 medial branch block on 02/22/2023. She did not receive pain relief to her low back after her lumbar MBBs. Current back pain located to bilateral lower back, radiates to bilateral buttocks with R > L , then  radiates down right leg down to lateral thigh and stops just above right  knee. Attributes right leg pain interfering with her inability to walk.  No pain on left leg. No numbness to either foot.  No diabetes history reports lumbar     MBB did not help low back pain. Reports weakness to bilateral legs X 1 year and getting worse. She fell a year ago. She broke her right wrist and top of right foot. She is being treated for osteoporosis per PCP.     Interventional Pain History  02/22/2023: MBB Right L4/L5 and L5/S1  Prior lumbar epidural steroid injections in 2012. Unsure what levels and patient was unable to receive copy of notes from Dr. Zaragoza's office as it was too long ago. These injections worked for a year. She received a series of 3 lumbar ESIs. She was not having weakness to her legs only pain and right leg numbness.     ROS low back pain    Pertinent Labs 2022:  Normal GFR + Crea + platelets and neutrophils    MRI lumbar spine 01/09/2023:   FINDINGS:  For the purpose of this report, the most inferior well developed intervertebral disc space is presumed to represent L5-S1. Lumbar vertebrae stature is unremarkable alignment is preserved.  There is a meningioma which involves T11 vertebral body.  There are no acute marrow edematous signals.  The visualized thoracic cord is unremarkable. The conus medullaris terminates at L1-L2.  Disc segmental analysis is given below:     At L1-L2, disc  is unremarkable.  There is moderate central canal stenosis which is caused by facet arthropathy.  There are no narrowings of the neural foramen.     At L2-L3, disc is unremarkable.  Central canal is not stenosed and there are no narrowings of the neural foramen     At L3-L4, there is broad disc bulge which mildly compresses the ventral thecal sac.  Bilateral facet arthropathy and ligamentum flavum thickening.  These findings combine to cause moderate central canal stenosis.  There are no narrowings of the neural foramen.     At L4-L5, there is disc bulge which slightly flattens the ventral thecal sac.  There is ligamentum flavum thickening and facet arthropathy.  These findings result in moderate central canal stenosis.  There are no narrowings of the neural foramen.     At L5-S1, there is bulging of annulus fibrosis which slightly flattens the ventral thecal sac bilateral facet arthropathy.  There is no significant central canal stenosis.  There also no significant narrowings of the neural foramen     Impression:     Lumbar degenerative disc disease and spondylosis level by level discussed above.           Objective:          Physical Exam  General: Morbidlly overweight; A&O x 3; No anxiety/depression; NAD  Mental Status: Oriented to person, palce and time. Displays appropriate mood & affect.  Head: Norm cephalic and atraumatic  Neck: full ROM without deficits bilateral movements Land R, flexion and extension. No cervical paraspinal banding.   Eyes: normal conjunctiva, normal lids, normal pupils  ENT and mouth: normal external ear, nose, and no lesions noted on the lips.  Respiratory: Symmetrical, Unlabored  Cardiovascular: normal S1/S2. Normal rate and Rhythm. + swelling both ankles  Abdomen: Pendulous     Extremities:  Gen: No cyanosis or tenderness to palpation bilateral LE  Skin: Warm, pink, dry, no rashes, no lesions on the lumbar spine  Strength: 4/5 motor strength bilateral LE  ROM: Full ROM in right knee.  Left knee weakness. Bilateral ankles without pain or instability.     Neuro:  Gait: unable to stand due to pain right leg and weakness to leg. Unable to  gait. Presents in wheelchair. Normal toe and heel raise  DTR's: 2+ in bilateral patellar,  Sensory: Intact to light touch bilateral  upper and lower extremities with numbness to right knee only.      Spine:notable tenderness to palpation over bilateral low back and buttocks. .     L-spine ROM: Normal ROM to flexion, Limited ROM and pain with extension, bilateral rotation     Straight Leg Raise: Negative bilaterally  SI Joint: No tenderness to palpation bilaterally,  Sensory: Intact to light touch bilateral upper and lower extremities             Assessment:     Patient relays pain to low back Current back pain located to bilateral lower back, radiates to bilateral buttocks with R > L buttock. Radiates down right leg down to lateral thigh and stops at right  knee. No pain on left leg. No numbness to either foot.  No diabetes history. States she has bilateral leg weakness. States she attributes leg weakness to sciatica. She is not interested in NCS/EMG. To evaluate weakness of legs.  She has a history of osteoporosis and prior fractures. Takes Fosamax, Vit D, and a MVI.     MRI lumbar spine shows disc bulge to L3/L4 and  L4/L5.  I recommended Right TFESI to L3 +L4  No blood thinners, NSAIDS or DOACs  Follow up post op to evaluate        Encounter Diagnoses   Name Primary?    Lumbar spondylosis Yes    Lumbar radiculopathy, chronic          Plan:         MRI lumbar spine shows disc bulge to L3/L4 and  L4/L5.  I am recommending Right TFESI to L3 +L4  No blood thinners, NSAIDS or DOACs  Follow up post op to evaluate  Recommended NCS/EMG as patient stated she has leg weakness. She is not interested in completing this procedure.        Eleanor was seen today for back pain.    Diagnoses and all orders for this visit:    Lumbar spondylosis    Lumbar radiculopathy,  chronic             Past Medical History:   Diagnosis Date    Anxiety disorder, unspecified     Depression     Digestive disorder     reflux    Hiatal hernia     Hyperlipemia     Neuropathy     feet    Restless leg syndrome     Short of breath on exertion     Sleep apnea, unspecified     cpap       Past Surgical History:   Procedure Laterality Date    APPENDECTOMY      ARTHROPLASTY OF SHOULDER      CHOLECYSTECTOMY      COLONOSCOPY  01/13/2020    ESOPHAGOGASTRODUODENOSCOPY  06/10/2016    FRACTURE SURGERY  06-    INJECTION OF ANESTHETIC AGENT AROUND MEDIAL BRANCH NERVES INNERVATING LUMBAR FACET JOINT Right 02/22/2023    Procedure: Block-nerve-medial branch-lumbar;  Surgeon: Cassi Muñoz MD;  Location: [facility] OR;  Service: Pain Management;  Laterality: Right;  L4/L5 and L5/S1    JOINT REPLACEMENT  03-    OPEN REDUCTION AND INTERNAL FIXATION (ORIF) OF FRACTURE OF DISTAL RADIUS Right 07/14/2022    Procedure: ORIF, FRACTURE, RADIUS, DISTAL;  Surgeon: Emery Morel MD;  Location: [facility] OR;  Service: Orthopedics;  Laterality: Right;    PROCTOSCOPY  03/23/2016       Family History   Problem Relation Age of Onset    Heart failure Mother     Heart disease Mother     Osteoporosis Sister     Osteoporosis Brother     Gout Brother     Arthritis Father        Social History     Socioeconomic History    Marital status:    Tobacco Use    Smoking status: Never    Smokeless tobacco: Never    Tobacco comments:     None   Substance and Sexual Activity    Alcohol use: Never    Drug use: Never    Sexual activity: Not Currently     Partners: Male     Birth control/protection: Partner-Vasectomy       Current Outpatient Medications   Medication Sig Dispense Refill    albuterol (PROVENTIL/VENTOLIN HFA) 90 mcg/actuation inhaler Inhale into the lungs every 4 (four) hours as needed.      alendronate (FOSAMAX) 70 MG tablet Take 1 tablet (70 mg total) by mouth once a week. 12 tablet 2    amLODIPine (NORVASC) 2.5 MG tablet  Take 2.5 mg by mouth 2 (two) times daily.      baclofen (LIORESAL) 10 MG tablet Take 1 tablet (10 mg total) by mouth 3 (three) times daily. 270 tablet 2    budesonide (RINOCORT AQUA) 32 mcg/actuation nasal spray 1 spray by Nasal route once daily at 6am.      buPROPion (WELLBUTRIN XL) 150 MG TB24 tablet Take 1 tablet (150 mg total) by mouth once daily. 30 tablet 3    dexbrompheniramine maleate (ALA-HIST IR) 2 mg Tab Ala-Hist IR Take 1 tablet (oral) every 6 hours PRN - Congestion for 5 days 20220319 tablet every 6 hours oral 5 days suspended 2 mg      docusate sodium 100 mg capsule Take 100 mg by mouth daily as needed.      ergocalciferol (ERGOCALCIFEROL) 50,000 unit Cap Take 1 capsule (50,000 Units total) by mouth every 7 days. 12 capsule 2    esomeprazole (NEXIUM) 40 MG capsule Take 1 capsule (40 mg total) by mouth before breakfast. 90 capsule 2    fluticasone propionate (FLONASE) 50 mcg/actuation nasal spray 2 sprays by Each Nostril route once daily at 6am.      furosemide (LASIX) 20 MG tablet Take 1 tablet (20 mg total) by mouth daily as needed (swelling). 30 tablet 6    loratadine (CLARITIN) 10 mg tablet Take 1 tablet (10 mg total) by mouth once daily. 90 tablet 2    potassium chloride (KLOR-CON) 10 MEQ TbSR Take 10 mEq by mouth once daily.      pramipexole (MIRAPEX) 0.5 MG tablet Take 1 tablet (0.5 mg total) by mouth every evening. 90 tablet 2    ranolazine (RANEXA) 500 MG Tb12 Take 500 mg by mouth 2 (two) times daily.      rosuvastatin (CRESTOR) 10 MG tablet Take 1 tablet (10 mg total) by mouth every evening. 90 tablet 2    sertraline (ZOLOFT) 100 MG tablet Take 2 tablets (200 mg total) by mouth once daily. 180 tablet 2    traZODone (DESYREL) 50 MG tablet May take 25mg to 50mg at bedtime as needed for insomnia 30 tablet 11    VENTOLIN HFA 90 mcg/actuation inhaler Inhale 2 puffs into the lungs every 6 (six) hours as needed for Shortness of Breath. 18 g 2     No current facility-administered medications for this  visit.       Review of patient's allergies indicates:   Allergen Reactions    Nsaids (non-steroidal anti-inflammatory drug) Nausea And Vomiting

## 2023-03-08 NOTE — H&P (VIEW-ONLY)
Subjective:      Patient ID: Eleanor Buckner is a 67 y.o. female.    Chief Complaint: Back Pain (Follow up from injections.  Pain at 7/10 . Tylenol not helping.  Pain radiated to right leg. /)    Referred by: No ref. provider found     HPI: Patient presents as a follow-up for pain status post postop right L4-L5 and L5-S1 medial branch block on 02/22/2023. She did not receive pain relief to her low back after her lumbar MBBs. Current back pain located to bilateral lower back, radiates to bilateral buttocks with R > L , then  radiates down right leg down to lateral thigh and stops just above right  knee. Attributes right leg pain interfering with her inability to walk.  No pain on left leg. No numbness to either foot.  No diabetes history reports lumbar     MBB did not help low back pain. Reports weakness to bilateral legs X 1 year and getting worse. She fell a year ago. She broke her right wrist and top of right foot. She is being treated for osteoporosis per PCP.     Interventional Pain History  02/22/2023: MBB Right L4/L5 and L5/S1  Prior lumbar epidural steroid injections in 2012. Unsure what levels and patient was unable to receive copy of notes from Dr. Zaragoza's office as it was too long ago. These injections worked for a year. She received a series of 3 lumbar ESIs. She was not having weakness to her legs only pain and right leg numbness.     ROS low back pain    Pertinent Labs 2022:  Normal GFR + Crea + platelets and neutrophils    MRI lumbar spine 01/09/2023:   FINDINGS:  For the purpose of this report, the most inferior well developed intervertebral disc space is presumed to represent L5-S1. Lumbar vertebrae stature is unremarkable alignment is preserved.  There is a meningioma which involves T11 vertebral body.  There are no acute marrow edematous signals.  The visualized thoracic cord is unremarkable. The conus medullaris terminates at L1-L2.  Disc segmental analysis is given below:     At L1-L2, disc  is unremarkable.  There is moderate central canal stenosis which is caused by facet arthropathy.  There are no narrowings of the neural foramen.     At L2-L3, disc is unremarkable.  Central canal is not stenosed and there are no narrowings of the neural foramen     At L3-L4, there is broad disc bulge which mildly compresses the ventral thecal sac.  Bilateral facet arthropathy and ligamentum flavum thickening.  These findings combine to cause moderate central canal stenosis.  There are no narrowings of the neural foramen.     At L4-L5, there is disc bulge which slightly flattens the ventral thecal sac.  There is ligamentum flavum thickening and facet arthropathy.  These findings result in moderate central canal stenosis.  There are no narrowings of the neural foramen.     At L5-S1, there is bulging of annulus fibrosis which slightly flattens the ventral thecal sac bilateral facet arthropathy.  There is no significant central canal stenosis.  There also no significant narrowings of the neural foramen     Impression:     Lumbar degenerative disc disease and spondylosis level by level discussed above.           Objective:          Physical Exam  General: Morbidlly overweight; A&O x 3; No anxiety/depression; NAD  Mental Status: Oriented to person, palce and time. Displays appropriate mood & affect.  Head: Norm cephalic and atraumatic  Neck: full ROM without deficits bilateral movements Land R, flexion and extension. No cervical paraspinal banding.   Eyes: normal conjunctiva, normal lids, normal pupils  ENT and mouth: normal external ear, nose, and no lesions noted on the lips.  Respiratory: Symmetrical, Unlabored  Cardiovascular: normal S1/S2. Normal rate and Rhythm. + swelling both ankles  Abdomen: Pendulous     Extremities:  Gen: No cyanosis or tenderness to palpation bilateral LE  Skin: Warm, pink, dry, no rashes, no lesions on the lumbar spine  Strength: 4/5 motor strength bilateral LE  ROM: Full ROM in right knee.  Left knee weakness. Bilateral ankles without pain or instability.     Neuro:  Gait: unable to stand due to pain right leg and weakness to leg. Unable to  gait. Presents in wheelchair. Normal toe and heel raise  DTR's: 2+ in bilateral patellar,  Sensory: Intact to light touch bilateral  upper and lower extremities with numbness to right knee only.      Spine:notable tenderness to palpation over bilateral low back and buttocks. .     L-spine ROM: Normal ROM to flexion, Limited ROM and pain with extension, bilateral rotation     Straight Leg Raise: Negative bilaterally  SI Joint: No tenderness to palpation bilaterally,  Sensory: Intact to light touch bilateral upper and lower extremities             Assessment:     Patient relays pain to low back Current back pain located to bilateral lower back, radiates to bilateral buttocks with R > L buttock. Radiates down right leg down to lateral thigh and stops at right  knee. No pain on left leg. No numbness to either foot.  No diabetes history. States she has bilateral leg weakness. States she attributes leg weakness to sciatica. She is not interested in NCS/EMG. To evaluate weakness of legs.  She has a history of osteoporosis and prior fractures. Takes Fosamax, Vit D, and a MVI.     MRI lumbar spine shows disc bulge to L3/L4 and  L4/L5.  I recommended Right TFESI to L3 +L4  No blood thinners, NSAIDS or DOACs  Follow up post op to evaluate        Encounter Diagnoses   Name Primary?    Lumbar spondylosis Yes    Lumbar radiculopathy, chronic          Plan:         MRI lumbar spine shows disc bulge to L3/L4 and  L4/L5.  I am recommending Right TFESI to L3 +L4  No blood thinners, NSAIDS or DOACs  Follow up post op to evaluate  Recommended NCS/EMG as patient stated she has leg weakness. She is not interested in completing this procedure.        Eleanor was seen today for back pain.    Diagnoses and all orders for this visit:    Lumbar spondylosis    Lumbar radiculopathy,  chronic             Past Medical History:   Diagnosis Date    Anxiety disorder, unspecified     Depression     Digestive disorder     reflux    Hiatal hernia     Hyperlipemia     Neuropathy     feet    Restless leg syndrome     Short of breath on exertion     Sleep apnea, unspecified     cpap       Past Surgical History:   Procedure Laterality Date    APPENDECTOMY      ARTHROPLASTY OF SHOULDER      CHOLECYSTECTOMY      COLONOSCOPY  01/13/2020    ESOPHAGOGASTRODUODENOSCOPY  06/10/2016    FRACTURE SURGERY  06-    INJECTION OF ANESTHETIC AGENT AROUND MEDIAL BRANCH NERVES INNERVATING LUMBAR FACET JOINT Right 02/22/2023    Procedure: Block-nerve-medial branch-lumbar;  Surgeon: Cassi Muñoz MD;  Location: [facility] OR;  Service: Pain Management;  Laterality: Right;  L4/L5 and L5/S1    JOINT REPLACEMENT  03-    OPEN REDUCTION AND INTERNAL FIXATION (ORIF) OF FRACTURE OF DISTAL RADIUS Right 07/14/2022    Procedure: ORIF, FRACTURE, RADIUS, DISTAL;  Surgeon: Emery Morel MD;  Location: [facility] OR;  Service: Orthopedics;  Laterality: Right;    PROCTOSCOPY  03/23/2016       Family History   Problem Relation Age of Onset    Heart failure Mother     Heart disease Mother     Osteoporosis Sister     Osteoporosis Brother     Gout Brother     Arthritis Father        Social History     Socioeconomic History    Marital status:    Tobacco Use    Smoking status: Never    Smokeless tobacco: Never    Tobacco comments:     None   Substance and Sexual Activity    Alcohol use: Never    Drug use: Never    Sexual activity: Not Currently     Partners: Male     Birth control/protection: Partner-Vasectomy       Current Outpatient Medications   Medication Sig Dispense Refill    albuterol (PROVENTIL/VENTOLIN HFA) 90 mcg/actuation inhaler Inhale into the lungs every 4 (four) hours as needed.      alendronate (FOSAMAX) 70 MG tablet Take 1 tablet (70 mg total) by mouth once a week. 12 tablet 2    amLODIPine (NORVASC) 2.5 MG tablet  Take 2.5 mg by mouth 2 (two) times daily.      baclofen (LIORESAL) 10 MG tablet Take 1 tablet (10 mg total) by mouth 3 (three) times daily. 270 tablet 2    budesonide (RINOCORT AQUA) 32 mcg/actuation nasal spray 1 spray by Nasal route once daily at 6am.      buPROPion (WELLBUTRIN XL) 150 MG TB24 tablet Take 1 tablet (150 mg total) by mouth once daily. 30 tablet 3    dexbrompheniramine maleate (ALA-HIST IR) 2 mg Tab Ala-Hist IR Take 1 tablet (oral) every 6 hours PRN - Congestion for 5 days 20220319 tablet every 6 hours oral 5 days suspended 2 mg      docusate sodium 100 mg capsule Take 100 mg by mouth daily as needed.      ergocalciferol (ERGOCALCIFEROL) 50,000 unit Cap Take 1 capsule (50,000 Units total) by mouth every 7 days. 12 capsule 2    esomeprazole (NEXIUM) 40 MG capsule Take 1 capsule (40 mg total) by mouth before breakfast. 90 capsule 2    fluticasone propionate (FLONASE) 50 mcg/actuation nasal spray 2 sprays by Each Nostril route once daily at 6am.      furosemide (LASIX) 20 MG tablet Take 1 tablet (20 mg total) by mouth daily as needed (swelling). 30 tablet 6    loratadine (CLARITIN) 10 mg tablet Take 1 tablet (10 mg total) by mouth once daily. 90 tablet 2    potassium chloride (KLOR-CON) 10 MEQ TbSR Take 10 mEq by mouth once daily.      pramipexole (MIRAPEX) 0.5 MG tablet Take 1 tablet (0.5 mg total) by mouth every evening. 90 tablet 2    ranolazine (RANEXA) 500 MG Tb12 Take 500 mg by mouth 2 (two) times daily.      rosuvastatin (CRESTOR) 10 MG tablet Take 1 tablet (10 mg total) by mouth every evening. 90 tablet 2    sertraline (ZOLOFT) 100 MG tablet Take 2 tablets (200 mg total) by mouth once daily. 180 tablet 2    traZODone (DESYREL) 50 MG tablet May take 25mg to 50mg at bedtime as needed for insomnia 30 tablet 11    VENTOLIN HFA 90 mcg/actuation inhaler Inhale 2 puffs into the lungs every 6 (six) hours as needed for Shortness of Breath. 18 g 2     No current facility-administered medications for this  visit.       Review of patient's allergies indicates:   Allergen Reactions    Nsaids (non-steroidal anti-inflammatory drug) Nausea And Vomiting

## 2023-03-17 ENCOUNTER — OFFICE VISIT (OUTPATIENT)
Dept: BEHAVIORAL HEALTH | Facility: CLINIC | Age: 68
End: 2023-03-17
Payer: COMMERCIAL

## 2023-03-17 DIAGNOSIS — F32.A MILD DEPRESSION: Primary | Chronic | ICD-10-CM

## 2023-03-17 DIAGNOSIS — G47.9 DIFFICULTY SLEEPING: Chronic | ICD-10-CM

## 2023-03-17 DIAGNOSIS — F41.9 ANXIETY: Chronic | ICD-10-CM

## 2023-03-17 PROCEDURE — 99443 PR PHYSICIAN TELEPHONE EVALUATION 21-30 MIN: ICD-10-PCS | Mod: 95,,, | Performed by: NURSE PRACTITIONER

## 2023-03-17 PROCEDURE — 99443 PR PHYSICIAN TELEPHONE EVALUATION 21-30 MIN: CPT | Mod: 95,,, | Performed by: NURSE PRACTITIONER

## 2023-03-17 RX ORDER — BUPROPION HYDROCHLORIDE 300 MG/1
300 TABLET ORAL DAILY
Qty: 30 TABLET | Refills: 3 | Status: SHIPPED | OUTPATIENT
Start: 2023-03-17 | End: 2023-09-19 | Stop reason: SDUPTHER

## 2023-03-17 NOTE — PROGRESS NOTES
Established Patient - Audio Only Telehealth Visit     The patient location is: home  The chief complaint leading to consultation is: depression, anxiety, med management  Visit type: Virtual visit with audio only (telephone)  Total time spent with patient: 30min    The reason for the audio only service rather than synchronous audio and video virtual visit was related to technical difficulties or patient preference/necessity.     Each patient to whom I provide medical services by telemedicine is:  (1) informed of the relationship between the physician and patient and the respective role of any other health care provider with respect to management of the patient; and (2) notified that they may decline to receive medical services by telemedicine and may withdraw from such care at any time. Patient verbally consented to receive this service via voice-only telephone call.    This service was not originating from a related E/M service provided within the previous 7 days nor will  to an E/M service or procedure within the next 24 hours or my soonest available appointment.  Prevailing standard of care was able to be met in this audio-only visit.      This provider was unable to connect via video.      Follow-up #2 03/17/2023  HPI: 68yo WFref[facility]  PMHx: HLD, GERD, Osteoporosis, Anxiety.    On her last visit, patient had been using a CPAP for about 3 months and was having difficulty getting used to it. She was sleeping about 6 hours but was still not sleeping well. Stated that she feels that she might feel better if she could sleep better. Trazodone 50mg was added for insomnia.   She was taking Zoloft 200mg q HS and Wellbutrin XL 150mg q day.   Stated that she was still anxious because she gets short of breath while trying to walk; feared that she would fall again.     Today, patient states that she is doing alright.  She states that she is taking Trazodone 25  and is sleeping better.   She is still taking the Zoloft 200mg at HS and Wellbutrin XL 150mg.     She states that she is still tired; has no energy. She would like to try an increase in the Wellbutrin XL. She finds that the Wellbutrin helps to decrease her anxiety. Discussed with patient the an increase in the Wellbutrin may not give her more energy but she would like to try.  Will increase to 300mg q day.     FU in 6 weeks. Patient requests audio call as she has great difficulty getting around.     PHQ score:  03/17/2023: 7 mild  01/30/2023: 8? - mild to moderate  07/27/2022: 16 - moderately severe    NELY-7:  03/17/2023: 13 moderate  01/30/2023: 6  mild  07/27/2022: 14 - moderate anxiety    Mental Status Evaluation:  Appearance:  Not assessed; telephone visit   Behavior:  normal, cooperative   Speech:  no latency; no press   Mood:  euthymic   Affect:  Not assessed; telephone visit   Thought Process:  normal and logical   Thought Content:  normal, no suicidality, no homicidality, delusions, or paranoia   Sensorium:  grossly intact   Cognition:  grossly intact   Insight:  intact   Judgment:  behavior is adequate to circumstances        Impression:  1. Depression - mild  2. Anxiety - moderate  3. Difficulty sleeping    Plan:  1. Continue Zoloft 200mg q HS  2. Increase Wellbutrin XL to 300mg q day  3. Continue Trazodone 25-50mg as needed for insomnia  4. FU in 6 weeks    Follow-up #1  01/30/2023  HPI: 66yo WFreferred by PCP to the [facility] and anxiety  PMHx: HLD, GERD, Osteoporosis, Anxiety.    On her initial visit, she was to continue the Zoloft 200mg q HS and Wellbutrin XL 150mg q day was added.  She did not make her FU appointment.    Today, She states that she feels that the Wellbutrin has been helpful.  She had a sleep study and now has a CPAP.   She states that she is having trouble getting used to the CPAP. She has had it for about 3 months.   When she turns, the CPAP will make a noise and  she wakes up and cannot go back to sleep.   She takes two 10mg Melatonin at night.  Believes that she is getting about 6 hours of sleep a night: 3 hours in her bed with the CPA and then 3 hours in her recliner without the CPAP.     She feels tired during the day.   States that if she could get sleep, she would feel better.     She is taking the Zoloft 200mg q HS and the Wellbutrin XL 150mg q day.   States that she is still anxious because she gets short of breath while trying to walk. She fears that she will fall again.     Will add Trazodone 50mg q HS.     PHQ score:  01/30/2023: 8? - mild to moderate  07/27/2022: 16 - moderately severe    NELY-7:  01/30/2023: 6  mild  07/27/2022: 14 - moderate anxiety    Mental Status Evaluation:  Appearance:  casually dressed, neatly groomed, obese, seated in wheelchair   Behavior:  cooperative; restless   Speech:  no latency; no press   Mood:  anxious   Affect:  guarded, mood-congruent   Thought Process:  normal and logical   Thought Content:  normal, no suicidality, no homicidality, delusions, or paranoia   Sensorium:  grossly intact   Cognition:  grossly intact   Insight:  intact   Judgment:  behavior is adequate to circumstances     Impression:  1. Depression - mod severe  2. Anxiety - moderate  3. Difficulty sleeping  4. May need PT    Plan:  1. Continue Zoloft 200mg q HS  2. Continue Wellbutrin XL 150mg q day  3. Start Trazodone 25-50mg as needed for insomnia  4. FU in 6 weeks      Initial Interview 07/27/2022  HPI: 66yo WFreferred by PCP to the [facility] depression and anxiety  PMHx: HLD, GERD, Osteoporosis, Anxiety.    Patient presents with her .   Patient explains that although she is taking Zoloft 200mg q HS, she is still anxious and has low energy. She gets discouraged because she cannot do the things that she used to do.   States that she used to be able to do housework but now she does not have the energy. She stopped doing housework 3 months ago.    States that she has been depressed all of her life; since around 8 or 9.   Her mother and father . Her father was an alcoholic. They would argue and she would get nervous. She is still nervous. Worries about everything and every body.     States that she stopped doing things 3 months ago because she has a hard time standing up. Her back gets weak.   She fell and broke her arm about a month ago and had surgery two weeks ago.   One month before breaking her arm, she broke her foot while climbing into the truck. She could not get her leg up high enough to step onto the step to get into the truck. The step broke.    In 2012, she fell and had to have shoulder replacement.     States that she has been taking Zoloft for about 10 years and it has worked well for her in the past.     Has not had a sleep study.   States that she does snore at night. Naps off and on during the day.   Does not feel that she could keep a mask on.   Requested PCP consider a sleep study.     Patient admits that she sits in the house a lot because she fears that she is going to fall. Both times that she fell, she reports that she had difficulty lifting her R leg. She fears that she is going to fall again.   Explained that the more she sits, the weaker she will become.  Strongly encouraged that she requests to go to PT to gain strength and confidence.     Will continue the Zoloft 200mg q HS.  Add Wellbutrin XL 150mg q day   FU in 3 weeks      Psychiatric History:   Reports a history of: depression and anxiety  History of mental health out-patient treatment:  History of in-patient psychiatric hospitalization: denies  History of suicidal ideations: denies  History of suicidal threats:  History of suicide attempts: denies  History of self mutilation: denies    History of psychotropic medications:   Strattera 40mg q day  Zoloft   Wellbutrin    Family Psychiatric History:  Mental Illness: denies  Alcohol abuse/addiction: father  Drug  addiction:    Substance Use History:  Hx of addiction or abuse: denies  Alcohol: denies  Amphetamines: denies  Benzodiazepines: denies  Cocaine: denies  Opiates: recent Rx for Noco after breaking her arm  Marijuana: denies  Tobacco: denies  Caffeine:    Social History:  Grew up in: [place]  Raised by: mother  Number of siblings: one sister and two brothers  Education: 9th grade  Sexual identity: heterosexual  Marital status:  x 51 years  Number of children: 4 adult sons  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    PHQ score:  07/27/2022: 16 - moderately severe  Over the last two weeks how often have you been bothered by little interest or pleasure in doing things Nearly every day   Over the last two weeks how often have you been bothered by feeling down, depressed or hopeless Nearly every day   Over the last two weeks how often have you been bothered by trouble falling or staying asleep, or sleeping too much Nearly every day     Over the last two weeks how often have you been bothered by feeling tired or having little energy Nearly every day   Over the last two weeks how often have you been bothered by a poor appetite or overeating Not at all   Over the last two weeks how often have you been bothered by feeling bad about yourself - or that you are a failure or have let yourself or your family down Several days   Over the last two weeks how often have you been bothered by trouble concentrating on things, such as reading the newspaper or watching television Not at all   Over the last two weeks how often have you been bothered by moving or speaking  so slowly that other people could have noticed. Or the opposite - being so fidgety or restless that you have been moving around a lot more than usual. Nearly every day   Over the last two weeks how often have you been bothered by thoughts that you would be better off dead, or of hurting yourself Not at all   If you checked off any problems, how difficult have these problems made it for you to do your work, take care of things at home or get along with other people? Very difficult   PHQ-9 Score 16   PHQ-9 Interpretation Moderately Severe     NELY-7:  07/27/2022: 14 - moderate anxiety  1. Feeling nervous, anxious, or on edge? Nearly everyday   2. Not being able to stop or control worrying? Nearly everyday   3. Worrying too much about different things? Nearly everyday   4. Trouble relaxing? Nearly everyday   5. Being so restless that it is hard to sit still? Not at all   6. Becoming easily annoyed or irritable? Several days   7. Feeling afraid as if something awful might happen? Several days   8. If you checked off any problems, how difficult have these problems made it for you to do your work, take care of things at home, or get along with other people? Very difficult   NELY-7 Score 14   Number answered (out of first 7) 7   Interpretation Moderate Anxiety     Mental Status Evaluation:  Appearance:  casually dressed, neatly groomed, obese, seated in wheelchair   Behavior:  cooperative; restless   Speech:  no latency; no press   Mood:  anxious   Affect:  guarded, mood-congruent   Thought Process:  normal and logical   Thought Content:  normal, no suicidality, no homicidality, delusions, or paranoia   Sensorium:  grossly intact   Cognition:  grossly intact   Insight:  intact   Judgment:  behavior is adequate to circumstances     Impression:  1. Depression - mod severe  2. Anxiety - moderate  3. Difficulty sleeping  4. May need PT    Plan:  1. Zoloft 200mg q HS  2. Wellbutrin XL 150mg q day  3. Sleep study???  4. FU in 3  weeks

## 2023-03-24 ENCOUNTER — LAB VISIT (OUTPATIENT)
Dept: LAB | Facility: HOSPITAL | Age: 68
End: 2023-03-24
Attending: NURSE PRACTITIONER
Payer: MEDICARE

## 2023-03-24 DIAGNOSIS — R73.01 IMPAIRED FASTING GLUCOSE: ICD-10-CM

## 2023-03-24 DIAGNOSIS — E55.9 VITAMIN D DEFICIENCY: Chronic | ICD-10-CM

## 2023-03-24 DIAGNOSIS — E78.5 HYPERLIPIDEMIA LDL GOAL <100: ICD-10-CM

## 2023-03-24 LAB
ALBUMIN SERPL-MCNC: 3.6 G/DL (ref 3.4–4.8)
ALBUMIN/GLOB SERPL: 0.9 RATIO (ref 1.1–2)
ALP SERPL-CCNC: 98 UNIT/L (ref 40–150)
ALT SERPL-CCNC: 15 UNIT/L (ref 0–55)
APPEARANCE UR: CLEAR
AST SERPL-CCNC: 15 UNIT/L (ref 5–34)
BACTERIA #/AREA URNS AUTO: ABNORMAL /HPF
BASOPHILS # BLD AUTO: 0.02 X10(3)/MCL (ref 0–0.2)
BASOPHILS NFR BLD AUTO: 0.3 %
BILIRUB UR QL STRIP.AUTO: NEGATIVE MG/DL
BILIRUBIN DIRECT+TOT PNL SERPL-MCNC: 0.5 MG/DL
BUN SERPL-MCNC: 19.9 MG/DL (ref 9.8–20.1)
CALCIUM SERPL-MCNC: 9.5 MG/DL (ref 8.4–10.2)
CHLORIDE SERPL-SCNC: 105 MMOL/L (ref 98–107)
CHOLEST SERPL-MCNC: 190 MG/DL
CHOLEST/HDLC SERPL: 3 {RATIO} (ref 0–5)
CO2 SERPL-SCNC: 29 MMOL/L (ref 23–31)
COLOR UR AUTO: YELLOW
CREAT SERPL-MCNC: 0.91 MG/DL (ref 0.55–1.02)
CREAT UR-MCNC: 115 MG/DL (ref 47–110)
DEPRECATED CALCIDIOL+CALCIFEROL SERPL-MC: 21.4 NG/ML (ref 30–80)
EOSINOPHIL # BLD AUTO: 0.22 X10(3)/MCL (ref 0–0.9)
EOSINOPHIL NFR BLD AUTO: 3.1 %
ERYTHROCYTE [DISTWIDTH] IN BLOOD BY AUTOMATED COUNT: 15.3 % (ref 11.5–17)
EST. AVERAGE GLUCOSE BLD GHB EST-MCNC: 114 MG/DL
GFR SERPLBLD CREATININE-BSD FMLA CKD-EPI: >60 MLS/MIN/1.73/M2
GLOBULIN SER-MCNC: 4.2 GM/DL (ref 2.4–3.5)
GLUCOSE SERPL-MCNC: 125 MG/DL (ref 82–115)
GLUCOSE UR QL STRIP.AUTO: NEGATIVE MG/DL
HBA1C MFR BLD: 5.6 %
HCT VFR BLD AUTO: 39.7 % (ref 37–47)
HDLC SERPL-MCNC: 69 MG/DL (ref 35–60)
HGB BLD-MCNC: 12.2 G/DL (ref 12–16)
IMM GRANULOCYTES # BLD AUTO: 0.02 X10(3)/MCL (ref 0–0.04)
IMM GRANULOCYTES NFR BLD AUTO: 0.3 %
KETONES UR QL STRIP.AUTO: NEGATIVE MG/DL
LDLC SERPL CALC-MCNC: 95 MG/DL (ref 50–140)
LEUKOCYTE ESTERASE UR QL STRIP.AUTO: ABNORMAL UNIT/L
LYMPHOCYTES # BLD AUTO: 1.71 X10(3)/MCL (ref 0.6–4.6)
LYMPHOCYTES NFR BLD AUTO: 24.4 %
MCH RBC QN AUTO: 27.5 PG (ref 27–31)
MCHC RBC AUTO-ENTMCNC: 30.7 G/DL (ref 33–36)
MCV RBC AUTO: 89.6 FL (ref 80–94)
MICROALBUMIN UR-MCNC: 20.8 UG/ML
MICROALBUMIN/CREAT RATIO PNL UR: 18.1 MG/GM CR (ref 0–30)
MONOCYTES # BLD AUTO: 0.37 X10(3)/MCL (ref 0.1–1.3)
MONOCYTES NFR BLD AUTO: 5.3 %
NEUTROPHILS # BLD AUTO: 4.67 X10(3)/MCL (ref 2.1–9.2)
NEUTROPHILS NFR BLD AUTO: 66.6 %
NITRITE UR QL STRIP.AUTO: NEGATIVE
PH UR STRIP.AUTO: 5.5 [PH]
PLATELET # BLD AUTO: 250 X10(3)/MCL (ref 130–400)
PMV BLD AUTO: 9.5 FL (ref 7.4–10.4)
POTASSIUM SERPL-SCNC: 4.2 MMOL/L (ref 3.5–5.1)
PROT SERPL-MCNC: 7.8 GM/DL (ref 5.8–7.6)
PROT UR QL STRIP.AUTO: NEGATIVE MG/DL
RBC # BLD AUTO: 4.43 X10(6)/MCL (ref 4.2–5.4)
RBC #/AREA URNS AUTO: ABNORMAL /HPF
RBC UR QL AUTO: ABNORMAL UNIT/L
SODIUM SERPL-SCNC: 143 MMOL/L (ref 136–145)
SP GR UR STRIP.AUTO: 1.02
SQUAMOUS #/AREA URNS AUTO: ABNORMAL /HPF
TRIGL SERPL-MCNC: 130 MG/DL (ref 37–140)
UROBILINOGEN UR STRIP-ACNC: 0.2 MG/DL
VLDLC SERPL CALC-MCNC: 26 MG/DL
WBC # SPEC AUTO: 7 X10(3)/MCL (ref 4.5–11.5)
WBC #/AREA URNS AUTO: ABNORMAL /HPF

## 2023-03-24 PROCEDURE — 80061 LIPID PANEL: CPT

## 2023-03-24 PROCEDURE — 36415 COLL VENOUS BLD VENIPUNCTURE: CPT

## 2023-03-24 PROCEDURE — 85025 COMPLETE CBC W/AUTO DIFF WBC: CPT

## 2023-03-24 PROCEDURE — 82043 UR ALBUMIN QUANTITATIVE: CPT

## 2023-03-24 PROCEDURE — 83036 HEMOGLOBIN GLYCOSYLATED A1C: CPT

## 2023-03-24 PROCEDURE — 82306 VITAMIN D 25 HYDROXY: CPT

## 2023-03-24 PROCEDURE — 80053 COMPREHEN METABOLIC PANEL: CPT

## 2023-03-24 PROCEDURE — 81001 URINALYSIS AUTO W/SCOPE: CPT

## 2023-03-29 ENCOUNTER — OFFICE VISIT (OUTPATIENT)
Dept: INTERNAL MEDICINE | Facility: CLINIC | Age: 68
End: 2023-03-29
Payer: MEDICARE

## 2023-03-29 DIAGNOSIS — M81.0 AGE-RELATED OSTEOPOROSIS WITHOUT CURRENT PATHOLOGICAL FRACTURE: Chronic | ICD-10-CM

## 2023-03-29 DIAGNOSIS — R05.1 ACUTE COUGH: ICD-10-CM

## 2023-03-29 DIAGNOSIS — G47.33 OSA ON CPAP: Chronic | ICD-10-CM

## 2023-03-29 DIAGNOSIS — E55.9 VITAMIN D DEFICIENCY: Chronic | ICD-10-CM

## 2023-03-29 DIAGNOSIS — E66.01 CLASS 3 SEVERE OBESITY DUE TO EXCESS CALORIES WITH SERIOUS COMORBIDITY AND BODY MASS INDEX (BMI) OF 60.0 TO 69.9 IN ADULT: Chronic | ICD-10-CM

## 2023-03-29 DIAGNOSIS — E78.5 HYPERLIPIDEMIA LDL GOAL <100: Primary | Chronic | ICD-10-CM

## 2023-03-29 PROCEDURE — 99214 PR OFFICE/OUTPT VISIT, EST, LEVL IV, 30-39 MIN: ICD-10-PCS | Mod: 95,,, | Performed by: NURSE PRACTITIONER

## 2023-03-29 PROCEDURE — 99214 OFFICE O/P EST MOD 30 MIN: CPT | Mod: 95,,, | Performed by: NURSE PRACTITIONER

## 2023-03-29 RX ORDER — GUAIFENESIN 1200 MG/1
1 TABLET, EXTENDED RELEASE ORAL 2 TIMES DAILY
Qty: 10 TABLET | Refills: 1 | Status: SHIPPED | OUTPATIENT
Start: 2023-03-29 | End: 2023-04-03

## 2023-03-29 RX ORDER — DEXTROMETHORPHAN POLISTIREX 30 MG/5ML
60 SUSPENSION ORAL 2 TIMES DAILY PRN
Qty: 200 ML | Refills: 0 | Status: SHIPPED | OUTPATIENT
Start: 2023-03-29 | End: 2023-04-08

## 2023-03-29 NOTE — PROGRESS NOTES
Patient ID: [id number]     Chief Complaint: Follow-up and Cough (Cough for 2 days - coughing up green./Audio call.)    HPI:     The patient location is: home  The chief complaint leading to consultation is: follow up    Visit type: audio only patient had difficulty connecting to virtual link,  patient called by ALEJANDRA Rutherford to assist with connection but patient still unable to connect. Audio visit performed.    Face to Face time with patient: 20 minutes  40 minutes of total time spent on the encounter, which includes face to face time and non-face to face time preparing to see the patient (eg, review of tests), Obtaining and/or reviewing separately obtained history, Documenting clinical information in the electronic or other health record, Independently interpreting results (not separately reported) and communicating results to the patient/family/caregiver, or Care coordination (not separately reported).   Each patient to whom he or she provides medical services by telemedicine is:  (1) informed of the relationship between the physician and patient and the respective role of any other health care provider with respect to management of the patient; and (2) notified that he or she may decline to receive medical services by telemedicine and may withdraw from such care at any time.    Eleanor Buckner is a 67 y.o. female with diagnosis of HLD, GERD, Osteoporosis, Hx of ORIF Right Distal Radius (07/14/2022), Chronic Low Back Pain, KI with CPAP, Obesity, Anxiety. Patient seen in clinic today for follow up on HLD. Patient last seen in clinic on 11/30/2023.  Patient states taking medications as prescribed, tolerating well. Patient currently prescribed Rosuvastatin 10 mg daily. Cholesterol levels WNL.   CT Chest ordered due to increased SOB, negative. Sleep Study completed on 09/12/2022; recommended CPAP. Patient states she is using it nightly. PFTs done 2020, normal.   Cardiology increased her Lasix to 20 mg daily prn  edema. Repeat Echo completed on 12/06/2022, normal EF.  Patient seen by Dr. Musa for BLE edema, angiogram scheduled.    Patient referred to pain management clinic for back pain. Has upcoming follow up appointment. Patient completed Physical Therapy without improvement. CT Lumbar done 12/2012, indicated Conus medullaris is normal; At L1-L2, there is bilateral facet arthropathy, There is minimal bulging of anulus fibrosis, There is no disc herniation or spinal stenosis; At L2-L3, there is moderate bilateral facet arthropathy, There is no disc herniation or spinal stenosis; At L3-L4, there is prominent bilateral facet arthropathy, There is mild ligamentum flaval hypertrophy, There is no spinal stenosis or  disc herniation; At L4-L5, there is very prominent bilateral facet arthropathy, There is ligamentum flavum hypertrophy, There is minimal bulging of anulus fibrosis, There is no disc herniation or significant appearing spinal stenosis; At L5-S1, there is severe bilateral facet arthropathy and hypertrophy, Mild bilateral upper foraminal stenosis is noted, There is no central stenosis or disc herniation.  Today, patient states cough x2 days. Patient denies increased SOB, chest pain, fever. Patient states he grandchildren were sick recently with same symptoms. Patient states taking Nyquil without relief. Patient denies any other acute complaints.     Patient followed by Mental Health Provider, Eleanor Anderson NP. Last appointment on 03/17/2023. Continue Zoloft 200 mg Qhs, Trazodone 25-50 mg Qhs prn, Increase Wellbutrin XL to 300 mg daily. Patient to follow up in 6 weeks.     Patient followed by Pain Management Clinic. Last appointment on 03/08/2023. Patient presents as a follow-up for pain status post postop right L4-L5 and L5-S1 medial branch block on 02/22/2023. She did not receive pain relief to her low back after her lumbar MBBs. Current back pain located to bilateral lower back, radiates to bilateral buttocks  with R > L , then  radiates down right leg down to lateral thigh and stops just above right  knee. Attributes right leg pain interfering with her inability to walk.  No pain on left leg. No numbness to either foot.  No diabetes history reports lumbar. MBB did not help low back pain. Reports weakness to bilateral legs X 1 year and getting worse. She fell a year ago. She broke her right wrist and top of right foot. She is being treated for osteoporosis per PCP. Dx: Lumbar spondylosis, Chronic Lumbar radiculopathy. MRI lumbar spine shows disc bulge to L3/L4 and  L4/L5. I am recommending Right TFESI to L3 +L4. No blood thinners, NSAIDS or DOACs. Follow up post op to evaluate. Recommended NCS/EMG as patient stated she has leg weakness. She is not interested in completing this procedure. Patient has follow up appointment scheduled for 04/05/2023.     Patient followed by Orthopedic Clinic. Last appointment on 02/23/2023.  Patient returns today for repeat evaluation of left knee.  Status post steroid injection on 01/26/2023.  She states the injection helped for about 3 days until she was walking and felt a pop in the back of her knee.  She denies any trauma.  She states she continues to have lot of pain about the posterior knee.  Denies any swelling at the time of the event.  Currently taking Tylenol as she can not take NSAIDs due to history of gastric issues.  She presents in a wheelchair today but states she normally uses a walker. Dx: Left Knee OA. Physical exam and previous imaging findings discussed with patient.  I believe she had a flare-up of her underlying arthritis had possibly ruptured a Baker's cyst.  Too soon for repeat injection today.  Patient is not a good surgical candidate due to elevated BMI.  We have discussed this today.  She will continue low-impact activities and Tylenol as tolerated.  I would like see the patient back in 2 months at this time. Patient has follow up appointment scheduled for  04/20/2023.      Patient followed by Cardiology Clinic. Last appointment on 11/04/2022. Eleanor Buckner 67 y.o. female with a past medical history of Morbid Obesity and HLD presents for follow up and results of Venous Insuffiencey Studies. RLE venous study on 10.3.22 showed no DVT and no substantial reflux study.  LLE venous study on 9.29.22 showed possible non-occlusive deep vein thrombus but no substantial reflux. Patient completed a Lexiscan Stress Test on 5.13.22 which was abnormal revealing a mild intensity, small sized, fixed perfusion abnormality consistent with scar in the apical wall(s) in the typical distribution of the LAD territory. Patient completed an chocardiogram in November 2021 which revealed an ejection fraction of approximately 55%. See report below. She also completed LINDY testing in November 2021 which revealed no evidence of significant arterial insufficiency. She completed a nuclear stress test on 9.19.19 that revealed no ischemia and no wall motion abnormality. She completed PFTs that revealed no abnormalities. Patient continues to endorse shortness of breath with ambulation as well as bilateral lower extremity edema.  Patient states the shortness of breath has worsened since last visit.  She denies chest pain, palpitations, or syncope.  Patient was recently diagnosed with KI and is compliant with her CPAP nightly. Patient states she is complaint with her current medication regimen. CANO - increased: Lexiscan Stress Test 5.13.22 showed  a mild intensity, small sized, fixed perfusion abnormality consistent with scar in the apical wall(s) in the typical distribution of the LAD territory; EF 55% per Echo 11.2021; EF 55% per Echo 7.3.19; Lexiscan stress test - negative for ischemia with no wall motion abnormalities (9.19.19); PFTs - normal; Will order Echocardiogram for worsening CANO; Counseled patient on weight loss and increased activity as tolerated. HLD: LDL at goal - 84; Continue  Crestor 10mg daily; Counseled on low-cholesterol, low-fat diet, and exercise as tolerated. Leg cramping/pain/edema: RLE venous study on 10.3.22 showed no DVT and no substantial reflux study; LLE venous study on 22 showed possible non-occlusive deep vein thrombus but no substantial reflux; LINDY testing 2021 - No evidence of significant arterial insufficiency was identified on the study; Will refer to Dr. Young for further evaluation of peripheral edema; Will increase Lasix to 20mg PO Qdprn; Recommend low salt diet, leg elevation, and compression stockings if able. Morbid obesity: Counseled patient on weight loss, heart healthy diet, and increased activity as tolerated. KI: Compliant with home CPAP nightly. Will order Echocardiogram. Will refer to Dr. Young for further evaluation of peripheral edema. Increase Lasix to 20mg PO Qdprn. Follow up in Cardiology Clinic in 3 months. Follow up with PCP as directed. Patient has follow up appointment scheduled 2023.     Review of patient's allergies indicates:   Allergen Reactions    Nsaids (non-steroidal anti-inflammatory drug) Nausea And Vomiting     Breast Cancer Screening: MMG negative on 2022  Cervical Cancer Screening: deferred due to age  Colorectal Cancer Screening: Colonoscopy on 2020 with recommended repeat in 10 years  Diabetic Eye Exam: N/A  Diabetic Foot Exam: N/A  Lung Cancer Screening: N/A  Prostate Cancer Screening: N/A  AAA Screening: N/A  Osteoporosis Screenin2021, osteoporosis; ordered 2023  Medicare Wellness: ordered  Immunizations:   Immunization History   Administered Date(s) Administered    COVID-19, MRNA, LN-S, PF (MODERNA FULL 0.5 ML DOSE) 2021, 2021    Pneumococcal Conjugate - 13 Valent 2019     Past Surgical History:   Procedure Laterality Date    APPENDECTOMY      ARTHROPLASTY OF SHOULDER      CHOLECYSTECTOMY      COLONOSCOPY  2020    ESOPHAGOGASTRODUODENOSCOPY  06/10/2016    FRACTURE  SURGERY  06-    INJECTION OF ANESTHETIC AGENT AROUND MEDIAL BRANCH NERVES INNERVATING LUMBAR FACET JOINT Right 02/22/2023    Procedure: Block-nerve-medial branch-lumbar;  Surgeon: Cassi Muñoz MD;  Location: [facility];  Service: Pain Management;  Laterality: Right;  L4/L5 and L5/S1    JOINT REPLACEMENT  03-    OPEN REDUCTION AND INTERNAL FIXATION (ORIF) OF FRACTURE OF DISTAL RADIUS Right 07/14/2022    Procedure: ORIF, FRACTURE, RADIUS, DISTAL;  Surgeon: Emery Morel MD;  Location: [facility] OR;  Service: Orthopedics;  Laterality: Right;    PROCTOSCOPY  03/23/2016     family history includes Arthritis in her father; Gout in her brother; Heart disease in her mother; Heart failure in her mother; Osteoporosis in her brother and sister.    Social History     Socioeconomic History    Marital status:    Tobacco Use    Smoking status: Never    Smokeless tobacco: Never    Tobacco comments:     None   Substance and Sexual Activity    Alcohol use: Never    Drug use: Never    Sexual activity: Not Currently     Partners: Male     Birth control/protection: Partner-Vasectomy     Current Outpatient Medications   Medication Instructions    albuterol (PROVENTIL/VENTOLIN HFA) 90 mcg/actuation inhaler Inhalation, Every 4 hours PRN    alendronate (FOSAMAX) 70 mg, Oral, Weekly    amLODIPine (NORVASC) 2.5 mg, Oral, 2 times daily    baclofen (LIORESAL) 10 mg, Oral, 3 times daily    budesonide (RINOCORT AQUA) 32 mcg/actuation nasal spray 1 spray, Nasal, Daily    buPROPion (WELLBUTRIN XL) 300 mg, Oral, Daily    dexbrompheniramine maleate (ALA-HIST IR) 2 mg Tab Ala-Hist IR Take 1 tablet (oral) every 6 hours PRN - Congestion for 5 days 20220319 tablet every 6 hours oral 5 days suspended 2 mg    dextromethorphan (DELSYM 12 HOUR) 60 mg, Oral, 2 times daily PRN    docusate sodium 100 mg, Oral, Daily PRN    ergocalciferol (ERGOCALCIFEROL) 50,000 Units, Oral, Every 7 days    esomeprazole (NEXIUM) 40 mg, Oral, Before breakfast     fluticasone propionate (FLONASE) 50 mcg/actuation nasal spray 2 sprays, Each Nostril, Daily    furosemide (LASIX) 20 mg, Oral, Daily PRN    guaiFENesin 1,200 mg Ta12 1 tablet, Oral, 2 times daily    loratadine (CLARITIN) 10 mg, Oral, Daily    potassium chloride (KLOR-CON) 10 MEQ TbSR 10 mEq, Oral, Daily    pramipexole (MIRAPEX) 0.5 mg, Oral, Nightly    ranolazine (RANEXA) 500 mg, Oral, 2 times daily    rosuvastatin (CRESTOR) 10 mg, Oral, Nightly    sertraline (ZOLOFT) 200 mg, Oral, Daily    traZODone (DESYREL) 50 MG tablet May take 25mg to 50mg at bedtime as needed for insomnia    VENTOLIN HFA 90 mcg/actuation inhaler 2 puffs, Inhalation, Every 6 hours PRN       Subjective:     Review of Systems   Constitutional: Negative.    HENT: Negative.     Eyes: Negative.    Respiratory:  Positive for cough.    Cardiovascular: Negative.    Gastrointestinal: Negative.    Endocrine: Negative.    Genitourinary: Negative.    Musculoskeletal: Negative.    Skin: Negative.    Allergic/Immunologic: Negative.    Neurological: Negative.    Hematological: Negative.    Psychiatric/Behavioral: Negative.       Objective:     There were no vitals taken for this visit.    Physical Exam  Neurological:      Mental Status: She is alert and oriented to person, place, and time.   Psychiatric:         Mood and Affect: Mood normal.       Labs Reviewed:     Hematology:  Lab Results   Component Value Date    WBC 7.0 03/24/2023    HGB 12.2 03/24/2023    HCT 39.7 03/24/2023     03/24/2023     Chemistry:  Lab Results   Component Value Date     03/24/2023    K 4.2 03/24/2023    CHLORIDE 105 03/24/2023    BUN 19.9 03/24/2023    CREATININE 0.91 03/24/2023    EGFRNORACEVR >60 03/24/2023    GLUCOSE 125 (H) 03/24/2023    CALCIUM 9.5 03/24/2023    ALKPHOS 98 03/24/2023    LABPROT 7.8 (H) 03/24/2023    ALBUMIN 3.6 03/24/2023    BILIDIR 0.2 11/04/2021    IBILI 0.30 11/04/2021    AST 15 03/24/2023    ALT 15 03/24/2023    FEPEGVWC71FC 21.4 (L)  03/24/2023     Lab Results   Component Value Date    HGBA1C 5.6 03/24/2023     Lipid Panel:  Lab Results   Component Value Date    CHOL 190 03/24/2023    HDL 69 (H) 03/24/2023    LDL 95.00 03/24/2023    TRIG 130 03/24/2023    TOTALCHOLEST 3 03/24/2023     Thyroid:  Lab Results   Component Value Date    TSH 4.6920 07/13/2022    T3FREE 2.29 03/11/2020     Urine:  Lab Results   Component Value Date    COLORUA Yellow 03/24/2023    APPEARANCEUA Clear 03/24/2023    SGUA 1.025 03/24/2023    PHUA 5.5 03/24/2023    PROTEINUA Negative 03/24/2023    GLUCOSEUA Negative 03/24/2023    KETONESUA Negative 03/24/2023    BLOODUA Trace-Intact (A) 03/24/2023    NITRITESUA Negative 03/24/2023    LEUKOCYTESUR Trace (A) 03/24/2023    RBCUA 0-2 03/24/2023    WBCUA 6-10 (A) 03/24/2023    BACTERIA Rare 03/24/2023    CREATRANDUR 115.0 (H) 03/24/2023        Assessment:       ICD-10-CM ICD-9-CM   1. Hyperlipidemia LDL goal <100  E78.5 272.4   2. Acute cough  R05.1 786.2   3. Age-related osteoporosis without current pathological fracture  M81.0 733.01   4. Vitamin D deficiency  E55.9 268.9   5. KI on CPAP  G47.33 327.23    Z99.89 V46.8   6. Class 3 severe obesity due to excess calories with serious comorbidity and body mass index (BMI) of 60.0 to 69.9 in adult  E66.01 278.01    Z68.44 V85.44        Plan:     1. Hyperlipidemia LDL goal <100  Continue Rosuvastatin 10 mg daily  Weight loss encouraged  Low fat/high fiber diet  Increase physical activity  Cholesterol Total   Date Value Ref Range Status   03/24/2023 190 <=200 mg/dL Final     HDL Cholesterol   Date Value Ref Range Status   03/24/2023 69 (H) 35 - 60 mg/dL Final     Triglyceride   Date Value Ref Range Status   03/24/2023 130 37 - 140 mg/dL Final     LDL Cholesterol   Date Value Ref Range Status   03/24/2023 95.00 50.00 - 140.00 mg/dL Final   - CBC Auto Differential; Future  - Comprehensive Metabolic Panel; Future  - Lipid Panel; Future    2. Acute cough  Start Guaifenesin 1,200 mg bid  x5 days  Start Dextromethorphan bid prn cough  Notify Clinic if no improvement    3. Age-related osteoporosis without current pathological fracture  Continue Fosamax 70 mg weekly  - DXA Bone Density Appendicular Skeleton; Future    4. Vitamin D deficiency  Vitamin D level: 21.4  Continue Vitamin D 50,000 units weekly  - Vitamin D; Future    5. KI on CPAP  Continue CPAP nightly    6. Class 3 severe obesity due to excess calories with serious comorbidity and body mass index (BMI) of 60.0 to 69.9 in adult  There is no height or weight on file to calculate BMI.  Thyroid Stimulating Hormone   Date Value Ref Range Status   07/13/2022 4.6920 0.3500 - 4.9400 uIU/mL Final     Vit D 25 OH   Date Value Ref Range Status   03/24/2023 21.4 (L) 30.0 - 80.0 ng/mL Final     Hemoglobin A1c   Date Value Ref Range Status   03/24/2023 5.6 <=7.0 % Final   Sleep Study: KI with CPAP  Weight Loss Encouraged  Increase Physical Activity       Follow up in about 6 months (around 9/29/2023) for Labs. In addition to their scheduled follow up, the patient has also been instructed to follow up on as needed basis.     Patience Conley, FRANCIS

## 2023-04-03 ENCOUNTER — ANESTHESIA EVENT (OUTPATIENT)
Dept: SURGERY | Facility: HOSPITAL | Age: 68
End: 2023-04-03
Payer: MEDICARE

## 2023-04-03 ENCOUNTER — PATIENT MESSAGE (OUTPATIENT)
Dept: ADMINISTRATIVE | Facility: OTHER | Age: 68
End: 2023-04-03
Payer: MEDICARE

## 2023-04-03 RX ORDER — ACETAMINOPHEN 500 MG
1000 TABLET ORAL NIGHTLY
COMMUNITY

## 2023-04-04 ENCOUNTER — PATIENT MESSAGE (OUTPATIENT)
Dept: ADMINISTRATIVE | Facility: OTHER | Age: 68
End: 2023-04-04
Payer: MEDICARE

## 2023-04-05 ENCOUNTER — HOSPITAL ENCOUNTER (OUTPATIENT)
Facility: HOSPITAL | Age: 68
Discharge: HOME | End: 2023-04-05
Attending: ANESTHESIOLOGY | Admitting: ANESTHESIOLOGY
Payer: MEDICARE

## 2023-04-05 ENCOUNTER — ANESTHESIA (OUTPATIENT)
Dept: SURGERY | Facility: HOSPITAL | Age: 68
End: 2023-04-05
Payer: MEDICARE

## 2023-04-05 DIAGNOSIS — G89.29 CHRONIC BACK PAIN GREATER THAN 3 MONTHS DURATION: ICD-10-CM

## 2023-04-05 DIAGNOSIS — M54.9 CHRONIC BACK PAIN GREATER THAN 3 MONTHS DURATION: ICD-10-CM

## 2023-04-05 PROCEDURE — 64483 NJX AA&/STRD TFRM EPI L/S 1: CPT | Mod: RT | Performed by: ANESTHESIOLOGY

## 2023-04-05 PROCEDURE — 64483 PR EPIDURAL INJ, ANES/STEROID, TRANSFORAMINAL, LUMB/SACR, SNGL LEVL: ICD-10-PCS | Mod: RT,,, | Performed by: ANESTHESIOLOGY

## 2023-04-05 PROCEDURE — 63600175 PHARM REV CODE 636 W HCPCS: Performed by: ANESTHESIOLOGY

## 2023-04-05 PROCEDURE — 63600175 PHARM REV CODE 636 W HCPCS

## 2023-04-05 PROCEDURE — D9220A PRA ANESTHESIA: ICD-10-PCS | Mod: CRNA,,,

## 2023-04-05 PROCEDURE — 25000003 PHARM REV CODE 250: Performed by: ANESTHESIOLOGY

## 2023-04-05 PROCEDURE — 25000003 PHARM REV CODE 250

## 2023-04-05 PROCEDURE — D9220A PRA ANESTHESIA: Mod: CRNA,,,

## 2023-04-05 PROCEDURE — 64483 NJX AA&/STRD TFRM EPI L/S 1: CPT | Mod: RT,,, | Performed by: ANESTHESIOLOGY

## 2023-04-05 PROCEDURE — D9220A PRA ANESTHESIA: Mod: ANES,,, | Performed by: ANESTHESIOLOGY

## 2023-04-05 PROCEDURE — D9220A PRA ANESTHESIA: ICD-10-PCS | Mod: ANES,,, | Performed by: ANESTHESIOLOGY

## 2023-04-05 PROCEDURE — 37000008 HC ANESTHESIA 1ST 15 MINUTES: Performed by: ANESTHESIOLOGY

## 2023-04-05 RX ORDER — BUPIVACAINE HYDROCHLORIDE 2.5 MG/ML
INJECTION, SOLUTION EPIDURAL; INFILTRATION; INTRACAUDAL
Status: DISCONTINUED | OUTPATIENT
Start: 2023-04-05 | End: 2023-04-05 | Stop reason: HOSPADM

## 2023-04-05 RX ORDER — LIDOCAINE HYDROCHLORIDE 20 MG/ML
INJECTION INTRAVENOUS
Status: DISCONTINUED | OUTPATIENT
Start: 2023-04-05 | End: 2023-04-05

## 2023-04-05 RX ORDER — BUPIVACAINE HYDROCHLORIDE 2.5 MG/ML
INJECTION, SOLUTION EPIDURAL; INFILTRATION; INTRACAUDAL
Status: DISCONTINUED
Start: 2023-04-05 | End: 2023-04-05 | Stop reason: HOSPADM

## 2023-04-05 RX ORDER — PROPOFOL 10 MG/ML
INJECTION, EMULSION INTRAVENOUS
Status: DISCONTINUED | OUTPATIENT
Start: 2023-04-05 | End: 2023-04-05

## 2023-04-05 RX ORDER — SODIUM CHLORIDE, SODIUM GLUCONATE, SODIUM ACETATE, POTASSIUM CHLORIDE AND MAGNESIUM CHLORIDE 30; 37; 368; 526; 502 MG/100ML; MG/100ML; MG/100ML; MG/100ML; MG/100ML
INJECTION, SOLUTION INTRAVENOUS CONTINUOUS
Status: DISCONTINUED | OUTPATIENT
Start: 2023-04-05 | End: 2023-04-05 | Stop reason: HOSPADM

## 2023-04-05 RX ORDER — LIDOCAINE HYDROCHLORIDE 10 MG/ML
INJECTION, SOLUTION EPIDURAL; INFILTRATION; INTRACAUDAL; PERINEURAL
Status: DISCONTINUED
Start: 2023-04-05 | End: 2023-04-05 | Stop reason: HOSPADM

## 2023-04-05 RX ORDER — ONDANSETRON 2 MG/ML
4 INJECTION INTRAMUSCULAR; INTRAVENOUS DAILY PRN
Status: CANCELLED | OUTPATIENT
Start: 2023-04-05

## 2023-04-05 RX ORDER — DEXAMETHASONE SODIUM PHOSPHATE 10 MG/ML
INJECTION INTRAMUSCULAR; INTRAVENOUS
Status: DISCONTINUED | OUTPATIENT
Start: 2023-04-05 | End: 2023-04-05 | Stop reason: HOSPADM

## 2023-04-05 RX ORDER — SODIUM CHLORIDE, SODIUM LACTATE, POTASSIUM CHLORIDE, CALCIUM CHLORIDE 600; 310; 30; 20 MG/100ML; MG/100ML; MG/100ML; MG/100ML
INJECTION, SOLUTION INTRAVENOUS CONTINUOUS
Status: DISCONTINUED | OUTPATIENT
Start: 2023-04-05 | End: 2023-04-05 | Stop reason: HOSPADM

## 2023-04-05 RX ORDER — SODIUM CHLORIDE, SODIUM GLUCONATE, SODIUM ACETATE, POTASSIUM CHLORIDE AND MAGNESIUM CHLORIDE 30; 37; 368; 526; 502 MG/100ML; MG/100ML; MG/100ML; MG/100ML; MG/100ML
INJECTION, SOLUTION INTRAVENOUS CONTINUOUS
Status: CANCELLED | OUTPATIENT
Start: 2023-04-05 | End: 2023-05-05

## 2023-04-05 RX ORDER — LIDOCAINE HYDROCHLORIDE 10 MG/ML
INJECTION, SOLUTION EPIDURAL; INFILTRATION; INTRACAUDAL; PERINEURAL
Status: DISCONTINUED | OUTPATIENT
Start: 2023-04-05 | End: 2023-04-05 | Stop reason: HOSPADM

## 2023-04-05 RX ORDER — DEXAMETHASONE SODIUM PHOSPHATE 10 MG/ML
INJECTION INTRAMUSCULAR; INTRAVENOUS
Status: DISCONTINUED
Start: 2023-04-05 | End: 2023-04-05 | Stop reason: HOSPADM

## 2023-04-05 RX ADMIN — PROPOFOL 50 MG: 10 INJECTION, EMULSION INTRAVENOUS at 10:04

## 2023-04-05 RX ADMIN — LIDOCAINE HYDROCHLORIDE 50 MG: 20 INJECTION INTRAVENOUS at 10:04

## 2023-04-05 NOTE — PLAN OF CARE
Pt tolerated procedure well. Ready for discharge.    Problem: Adult Inpatient Plan of Care  Goal: Plan of Care Review  Outcome: Met  Goal: Patient-Specific Goal (Individualized)  Outcome: Met  Goal: Absence of Hospital-Acquired Illness or Injury  Outcome: Met  Goal: Optimal Comfort and Wellbeing  Outcome: Met  Goal: Readiness for Transition of Care  Outcome: Met     Problem: Bariatric Environmental Safety  Goal: Safety Maintained with Care  Outcome: Met

## 2023-04-05 NOTE — ANESTHESIA POSTPROCEDURE EVALUATION
Anesthesia Post Evaluation    Patient: Eleanor Buckner    Procedure(s) Performed: Procedure(s) (LRB):  Injection,steroid,epidural,transforaminal approach (Right)    Final Anesthesia Type: general      Patient location during evaluation: PACU  Patient participation: Yes- Able to Participate  Level of consciousness: awake and alert  Post-procedure vital signs: reviewed and stable  Pain management: adequate  Airway patency: patent    PONV status at discharge: No PONV  Anesthetic complications: no      Cardiovascular status: hemodynamically stable  Respiratory status: unassisted  Hydration status: euvolemic  Follow-up not needed.          Vitals Value Taken Time   /73 04/05/23 1131   Temp ** 04/05/23 1219   Pulse 70 04/05/23 1131   Resp 20 04/05/23 1131   SpO2 98 % 04/05/23 1131         No case tracking events are documented in the log.      Pain/Nicko Score: Nicko Score: 10 (4/5/2023 11:07 AM)  Modified Nicko Score: 20 (4/5/2023 11:07 AM)

## 2023-04-05 NOTE — OP NOTE
Procedure:    Right L3  transforaminal epidural steroid injection    Right L4  transforaminal epidural steroid injection    Pre-Procedure Diagnoses:  Chronic back pain greater than 3 months  Lumbar degenerative disc disease  Lumbar radiculopathy  Lumbar disc displacement    Post-Procedure Diagnoses:  Chronic back pain greater than 3 months  Lumbar degenerative disc disease  Lumbar radiculopathy  Lumbar disc displacement    Anesthesia:  Local and MAC    Estimated Blood Loss:  < 2 ML    Consent:  The procedure, risks, benefits, and alternatives were discussed with the patient.  The patient voiced understanding and fully informed written consent was obtained.    Description of the Procedure:  The patient was taken to the operating room and placed in the prone position. The skin overlying the lumbar spine was prepped with Chloraprep and draped in the usual sterile fashion.  An oblique fluoroscopic view was obtained on the right side at L3, with the superior articular process of the inferior vertebral body aligned with the pedicle.  Skin anesthesia was achieved using 2 mL of lidocaine 1%.  A 22-gauge 7-inch Quinke spinal needle was inserted and advanced under intermittent fluoroscopic views into the epidural space. Proper needle position was confirmed under AP, oblique, and lateral fluoroscopic views.  Negative aspiration for blood or CSF was confirmed. 1 mL of contrast was injected, which revealed spread into the epidural space.  Then a combination of 5 mg of dexamethasone with 1 mL of 0.25% bupivacaine was easily injected.   There was no pain on injection. The needle was removed intact and bleeding was nil.  The same procedure was repeated in identical fashion on the right side at L4.  Sterile bandages were applied. The patient was taken to the recovery room for further observation in stable condition. The patient was then discharged home with no complications.    
Orthopedic

## 2023-04-05 NOTE — DISCHARGE SUMMARY
[facility] - Periop Services  Discharge Note  Short Stay    Procedure(s) (LRB):  Injection,steroid,epidural,transforaminal approach (Right)      OUTCOME: Patient tolerated treatment/procedure well without complication and is now ready for discharge.    DISPOSITION: Home or Self Care    FINAL DIAGNOSIS:  <principal problem not specified>    FOLLOWUP: In clinic    DISCHARGE INSTRUCTIONS:  No discharge procedures on file.     TIME SPENT ON DISCHARGE: 5 minutes

## 2023-04-05 NOTE — ANESTHESIA PREPROCEDURE EVALUATION
04/05/2023  Eleanor Buckner is a 67 y.o., female with ----------------------------  Anxiety disorder, unspecified  Asthma  Depression  Digestive disorder      Comment:  reflux  Hiatal hernia  Hyperlipemia  Neuropathy      Comment:  feet  PVD (peripheral vascular disease)  Restless leg syndrome  Short of breath on exertion  Sleep apnea, unspecified      Comment:  cpap   SuperMorbid Obesity with BMI of >67    And ----------------------------  Appendectomy  Cholecystectomy  Colonoscopy  Esophagogastroduodenoscopy  Injection of anesthetic agent around medial branch nerves innervating   lumbar facet joint      Comment:  Procedure: Block-nerve-medial branch-lumbar;  Surgeon:                Cassi Muñoz MD;  Location: [facility];  Service: Pain                Management;  Laterality: Right;  L4/L5 and L5/S1  Joint replacement      Comment:  total shoulder  Open reduction and internal fixation (orif) of fracture of distal   radius      Comment:  Procedure: ORIF, FRACTURE, RADIUS, DISTAL;  Surgeon:                Emery Morel MD;  Location: [facility];  Service:                Orthopedics;  Laterality: Right;  Proctoscopy    Presents for LESI.      Pre-op Assessment    I have reviewed the NPO Status.      Review of Systems      Physical Exam  General: Well nourished, Cooperative, Alert and Oriented    Airway:  Mallampati: III   Mouth Opening: Small, but > 3cm  Tongue: Normal, Large  Neck ROM: Normal ROM  Neck: Girth Increased    Dental:  Dentures    Chest/Lungs:  Clear to auscultation, Normal Respiratory Rate    Heart:  Rate: Normal  Rhythm: Regular Rhythm        Anesthesia Plan  Type of Anesthesia, risks & benefits discussed:    Anesthesia Type: Gen Natural Airway  Intra-op Monitoring Plan: Standard ASA Monitors  Post Op Pain Control Plan: IV/PO Opioids PRN  Induction:  IV  Airway Plan: Direct  Informed  Consent: Informed consent signed with the Patient and all parties understand the risks and agree with anesthesia plan.  All questions answered. Patient consented to blood products? No  ASA Score: 3  Day of Surgery Review of History & Physical: H&P Update referred to the surgeon/provider.  Anesthesia Plan Notes: Nasal cannula vs facemask supplemental oxygenation   For patients with KI/obesity, may consider SuperNoval Nasal CPAP      Ready For Surgery From Anesthesia Perspective.     .

## 2023-04-05 NOTE — TRANSFER OF CARE
"Anesthesia Transfer of Care Note    Patient: Eleanor Buckner    Procedure(s) Performed: Procedure(s) (LRB):  Injection,steroid,epidural,transforaminal approach (Right)    Patient location: OPS    Anesthesia Type: general    Transport from OR: Transported from OR on room air with adequate spontaneous ventilation    Post pain: adequate analgesia    Post assessment: no apparent anesthetic complications    Post vital signs: stable    Level of consciousness: awake, alert and oriented    Nausea/Vomiting: no nausea/vomiting    Complications: none    Transfer of care protocol was followed      Last vitals:   Visit Vitals  /62   Pulse 66   Temp 36.4 °C (97.5 °F)   Resp 18   Ht 5' 2" (1.575 m)   Wt (!) 163.3 kg (360 lb)   SpO2 96%   Breastfeeding No   BMI 65.84 kg/m²     "

## 2023-04-06 VITALS
OXYGEN SATURATION: 98 % | WEIGHT: 293 LBS | DIASTOLIC BLOOD PRESSURE: 73 MMHG | TEMPERATURE: 98 F | HEIGHT: 62 IN | SYSTOLIC BLOOD PRESSURE: 135 MMHG | RESPIRATION RATE: 20 BRPM | HEART RATE: 70 BPM | BODY MASS INDEX: 53.92 KG/M2

## 2023-04-19 ENCOUNTER — OFFICE VISIT (OUTPATIENT)
Dept: PAIN MEDICINE | Facility: CLINIC | Age: 68
End: 2023-04-19
Payer: MEDICARE

## 2023-04-19 VITALS
WEIGHT: 293 LBS | DIASTOLIC BLOOD PRESSURE: 62 MMHG | HEART RATE: 72 BPM | SYSTOLIC BLOOD PRESSURE: 123 MMHG | TEMPERATURE: 99 F | BODY MASS INDEX: 53.92 KG/M2 | HEIGHT: 62 IN

## 2023-04-19 DIAGNOSIS — M51.26 LUMBAR DISC DISPLACEMENT WITHOUT MYELOPATHY: Primary | ICD-10-CM

## 2023-04-19 DIAGNOSIS — G89.29 CHRONIC BILATERAL LOW BACK PAIN, UNSPECIFIED WHETHER SCIATICA PRESENT: ICD-10-CM

## 2023-04-19 DIAGNOSIS — M54.50 CHRONIC BILATERAL LOW BACK PAIN, UNSPECIFIED WHETHER SCIATICA PRESENT: ICD-10-CM

## 2023-04-19 DIAGNOSIS — M51.36 LUMBAR DEGENERATIVE DISC DISEASE: ICD-10-CM

## 2023-04-19 DIAGNOSIS — M54.16 LUMBAR RADICULOPATHY: ICD-10-CM

## 2023-04-19 PROCEDURE — 99214 PR OFFICE/OUTPT VISIT, EST, LEVL IV, 30-39 MIN: ICD-10-PCS | Mod: ,,, | Performed by: NURSE PRACTITIONER

## 2023-04-19 PROCEDURE — 99214 OFFICE O/P EST MOD 30 MIN: CPT | Mod: ,,, | Performed by: NURSE PRACTITIONER

## 2023-04-19 NOTE — PROGRESS NOTES
Subjective:      Patient ID: Eleanor Buckner is a 67 y.o. female.    Chief Complaint: Low-back Pain (Post-op injection, walking with Rolator,  pt states she received great relief, still has some pain when standing, pt denies pain today)    Referred by: No ref. provider found     HPI: Patient presents as a follow-up for lumbar radiculopathy with lumbar disc displacement status postop receipt of right L3 and L4 transforaminal epidural steroid injection on 04/05/2023. Patient had good pain control since receiving her lumbar WHIT. Current pain score is 6/10 vs last office visit was 7/10. She would like to obtain a repeat  Right L3 and right L4 transforaminal epidural steroid injections. SHe is not able to ambulate with her rollator instead of using a wheelchair. No new falls or injuries.      Current back pain located to bilateral lower back, radiates to bilateral buttocks with R > L , then  radiates down right leg down to lateral thigh and stops just above right  knee. Attributes right leg pain interfering with her inability to walk.  No pain on left leg. No numbness to either foot.  No diabetes history reports lumbar         Interventional Pain History  04/05/2023: Right L3 and right L4 transforaminal epidural steroid injections  02/22/2023: MBB Right L4/L5 and L5/S1  Prior lumbar epidural steroid injections in 2012. Unsure what levels and patient was unable to receive copy of notes from Dr. Zaragoza's office as it was too long ago. These injections worked for a year. She received a series of 3 lumbar ESIs. She was not having weakness to her legs only pain and right leg numbness.    Pertinent Labs 2022:  Normal GFR + Crea + platelets and neutrophils    ROS right sciatica    MRI lumbar spine 01/09/2023:   FINDINGS:  For the purpose of this report, the most inferior well developed intervertebral disc space is presumed to represent L5-S1. Lumbar vertebrae stature is unremarkable alignment is preserved.  There is a  meningioma which involves T11 vertebral body.  There are no acute marrow edematous signals.  The visualized thoracic cord is unremarkable. The conus medullaris terminates at L1-L2.  Disc segmental analysis is given below:     At L1-L2, disc is unremarkable.  There is moderate central canal stenosis which is caused by facet arthropathy.  There are no narrowings of the neural foramen.     At L2-L3, disc is unremarkable.  Central canal is not stenosed and there are no narrowings of the neural foramen     At L3-L4, there is broad disc bulge which mildly compresses the ventral thecal sac.  Bilateral facet arthropathy and ligamentum flavum thickening.  These findings combine to cause moderate central canal stenosis.  There are no narrowings of the neural foramen.     At L4-L5, there is disc bulge which slightly flattens the ventral thecal sac.  There is ligamentum flavum thickening and facet arthropathy.  These findings result in moderate central canal stenosis.  There are no narrowings of the neural foramen.     At L5-S1, there is bulging of annulus fibrosis which slightly flattens the ventral thecal sac bilateral facet arthropathy.  There is no significant central canal stenosis.  There also no significant narrowings of the neural foramen     Impression:     Lumbar degenerative disc disease and spondylosis level by level discussed above.                  Objective:          Physical Exam  General: Morbidlly obese  A&O x 3; No anxiety/depression; NAD  Mental Status: Oriented to person, palce and time. Displays appropriate mood & affect.  Head: Norm cephalic and atraumatic  Neck: full ROM without deficits bilateral movements Land R, flexion and extension. No cervical paraspinal banding.   Eyes: normal conjunctiva, normal lids, normal pupils  ENT and mouth: normal external ear, nose, and no lesions noted on the lips.  Respiratory: Symmetrical, Unlabored  Cardiovascular: normal S1/S2. Normal rate and Rhythm. + swelling both  ankles  Abdomen: Pendulous     Extremities:  Gen: No cyanosis or tenderness to palpation bilateral LE  Skin: Warm, pink, dry, no rashes, no lesions on the lumbar spine  Strength: 4/5 motor strength bilateral LE  ROM: Full ROM in right knee. Left knee weakness. Bilateral ankles without pain or instability.     Neuro:  Gait: unable to stand due to pain right leg and weakness to leg. Unable to  gait. Presents in wheelchair. Normal toe and heel raise  DTR's: 2+ in bilateral patellar,  Sensory: Intact to light touch bilateral  upper and lower extremities with numbness to right knee only.      Spine:notable tenderness to palpation over bilateral low back and buttocks. .     L-spine ROM: Normal ROM to flexion, Limited ROM and pain with extension, bilateral rotation     Straight Leg Raise: Negative bilaterally  SI Joint: No tenderness to palpation bilaterally,  Sensory: Intact to light touch bilateral upper and lower extremities           Assessment:     A/P patient had good relief to right sciatica and has been more mobile using Rollator  now instead of her wheelchair. She still has sciatica in L4 dermatome region to bilateral legs and would like to repeat TFESI to bilateral L4.   No blood thinners  Follow up with me post op  Suggested dietician consult with PCP for weight loss. SHe will follow up.     Encounter Diagnoses   Name Primary?    Lumbar disc displacement without myelopathy Yes    Lumbar radiculopathy     Lumbar degenerative disc disease     Chronic bilateral low back pain, unspecified whether sciatica present          Plan:       Eleanor was seen today for low-back pain.    Diagnoses and all orders for this visit:    Lumbar disc displacement without myelopathy    Lumbar radiculopathy    Lumbar degenerative disc disease    Chronic bilateral low back pain, unspecified whether sciatica present               Past Medical History:   Diagnosis Date    Anxiety disorder, unspecified     Asthma     Depression      Digestive disorder     reflux    Hiatal hernia     Hyperlipemia     Neuropathy     feet    PVD (peripheral vascular disease)     Restless leg syndrome     Short of breath on exertion     Sleep apnea, unspecified     cpap       Past Surgical History:   Procedure Laterality Date    APPENDECTOMY      CHOLECYSTECTOMY      COLONOSCOPY  01/13/2020    ESOPHAGOGASTRODUODENOSCOPY  06/10/2016    INJECTION OF ANESTHETIC AGENT AROUND MEDIAL BRANCH NERVES INNERVATING LUMBAR FACET JOINT Right 02/22/2023    Procedure: Block-nerve-medial branch-lumbar;  Surgeon: Cassi Muñoz MD;  Location: [facility] OR;  Service: Pain Management;  Laterality: Right;  L4/L5 and L5/S1    JOINT REPLACEMENT Right 03-    total shoulder    OPEN REDUCTION AND INTERNAL FIXATION (ORIF) OF FRACTURE OF DISTAL RADIUS Right 07/14/2022    Procedure: ORIF, FRACTURE, RADIUS, DISTAL;  Surgeon: Emery Morel MD;  Location: [facility];  Service: Orthopedics;  Laterality: Right;    PROCTOSCOPY  03/23/2016    TRANSFORAMINAL EPIDURAL INJECTION OF STEROID Right 4/5/2023    Procedure: Injection,steroid,epidural,transforaminal approach;  Surgeon: Cassi Muñoz MD;  Location: [facility];  Service: Pain Management;  Laterality: Right;  TFESI on Right at L3/L4       Family History   Problem Relation Age of Onset    Heart failure Mother     Heart disease Mother     Osteoporosis Sister     Osteoporosis Brother     Gout Brother     Arthritis Father        Social History     Socioeconomic History    Marital status:    Tobacco Use    Smoking status: Never    Smokeless tobacco: Never    Tobacco comments:     None   Substance and Sexual Activity    Alcohol use: Never    Drug use: Never    Sexual activity: Yes     Partners: Male     Birth control/protection: Partner-Vasectomy       Current Outpatient Medications   Medication Sig Dispense Refill    acetaminophen (TYLENOL) 500 MG tablet Take 1,000 mg by mouth nightly.      albuterol (PROVENTIL/VENTOLIN HFA) 90  mcg/actuation inhaler Inhale into the lungs every 4 (four) hours as needed.      alendronate (FOSAMAX) 70 MG tablet Take 1 tablet (70 mg total) by mouth once a week. 12 tablet 2    amLODIPine (NORVASC) 2.5 MG tablet Take 2.5 mg by mouth 2 (two) times daily.      baclofen (LIORESAL) 10 MG tablet Take 1 tablet (10 mg total) by mouth 3 (three) times daily. 270 tablet 2    budesonide (RINOCORT AQUA) 32 mcg/actuation nasal spray 1 spray by Nasal route once daily at 6am.      buPROPion (WELLBUTRIN XL) 300 MG 24 hr tablet Take 1 tablet (300 mg total) by mouth once daily. 30 tablet 3    dexbrompheniramine maleate (ALA-HIST IR) 2 mg Tab Ala-Hist IR Take 1 tablet (oral) every 6 hours PRN - Congestion for 5 days [id number] tablet every 6 hours oral 5 days suspended 2 mg      docusate sodium 100 mg capsule Take 100 mg by mouth daily as needed.      ergocalciferol (ERGOCALCIFEROL) 50,000 unit Cap Take 1 capsule (50,000 Units total) by mouth every 7 days. 12 capsule 2    esomeprazole (NEXIUM) 40 MG capsule Take 1 capsule (40 mg total) by mouth before breakfast. 90 capsule 2    fluticasone propionate (FLONASE) 50 mcg/actuation nasal spray 2 sprays by Each Nostril route once daily at 6am.      furosemide (LASIX) 20 MG tablet Take 1 tablet (20 mg total) by mouth daily as needed (swelling). 30 tablet 6    loratadine (CLARITIN) 10 mg tablet Take 1 tablet (10 mg total) by mouth once daily. 90 tablet 2    potassium chloride (KLOR-CON) 10 MEQ TbSR Take 10 mEq by mouth daily as needed.      pramipexole (MIRAPEX) 0.5 MG tablet Take 1 tablet (0.5 mg total) by mouth every evening. 90 tablet 2    ranolazine (RANEXA) 500 MG Tb12 Take 500 mg by mouth 2 (two) times daily.      rosuvastatin (CRESTOR) 10 MG tablet Take 1 tablet (10 mg total) by mouth every evening. 90 tablet 2    sertraline (ZOLOFT) 100 MG tablet Take 2 tablets (200 mg total) by mouth once daily. (Patient taking differently: Take 200 mg by mouth every evening.) 180 tablet 2     traZODone (DESYREL) 50 MG tablet May take 25mg to 50mg at bedtime as needed for insomnia (Patient taking differently: Take 50 mg by mouth every evening. May take 25mg to 50mg at bedtime as needed for insomnia) 30 tablet 11    VENTOLIN HFA 90 mcg/actuation inhaler Inhale 2 puffs into the lungs every 6 (six) hours as needed for Shortness of Breath. 18 g 2     No current facility-administered medications for this visit.       Review of patient's allergies indicates:   Allergen Reactions    Nsaids (non-steroidal anti-inflammatory drug) Nausea And Vomiting

## 2023-04-19 NOTE — H&P (VIEW-ONLY)
Subjective:      Patient ID: Eleanor Buckner is a 67 y.o. female.    Chief Complaint: Low-back Pain (Post-op injection, walking with Rolator,  pt states she received great relief, still has some pain when standing, pt denies pain today)    Referred by: No ref. provider found     HPI: Patient presents as a follow-up for lumbar radiculopathy with lumbar disc displacement status postop receipt of right L3 and L4 transforaminal epidural steroid injection on 04/05/2023. Patient had good pain control since receiving her lumbar WHIT. Current pain score is 6/10 vs last office visit was 7/10. She would like to obtain a repeat  Right L3 and right L4 transforaminal epidural steroid injections. SHe is not able to ambulate with her rollator instead of using a wheelchair. No new falls or injuries.      Current back pain located to bilateral lower back, radiates to bilateral buttocks with R > L , then  radiates down right leg down to lateral thigh and stops just above right  knee. Attributes right leg pain interfering with her inability to walk.  No pain on left leg. No numbness to either foot.  No diabetes history reports lumbar         Interventional Pain History  04/05/2023: Right L3 and right L4 transforaminal epidural steroid injections  02/22/2023: MBB Right L4/L5 and L5/S1  Prior lumbar epidural steroid injections in 2012. Unsure what levels and patient was unable to receive copy of notes from Dr. Zaragoza's office as it was too long ago. These injections worked for a year. She received a series of 3 lumbar ESIs. She was not having weakness to her legs only pain and right leg numbness.    Pertinent Labs 2022:  Normal GFR + Crea + platelets and neutrophils    ROS right sciatica    MRI lumbar spine 01/09/2023:   FINDINGS:  For the purpose of this report, the most inferior well developed intervertebral disc space is presumed to represent L5-S1. Lumbar vertebrae stature is unremarkable alignment is preserved.  There is a  meningioma which involves T11 vertebral body.  There are no acute marrow edematous signals.  The visualized thoracic cord is unremarkable. The conus medullaris terminates at L1-L2.  Disc segmental analysis is given below:     At L1-L2, disc is unremarkable.  There is moderate central canal stenosis which is caused by facet arthropathy.  There are no narrowings of the neural foramen.     At L2-L3, disc is unremarkable.  Central canal is not stenosed and there are no narrowings of the neural foramen     At L3-L4, there is broad disc bulge which mildly compresses the ventral thecal sac.  Bilateral facet arthropathy and ligamentum flavum thickening.  These findings combine to cause moderate central canal stenosis.  There are no narrowings of the neural foramen.     At L4-L5, there is disc bulge which slightly flattens the ventral thecal sac.  There is ligamentum flavum thickening and facet arthropathy.  These findings result in moderate central canal stenosis.  There are no narrowings of the neural foramen.     At L5-S1, there is bulging of annulus fibrosis which slightly flattens the ventral thecal sac bilateral facet arthropathy.  There is no significant central canal stenosis.  There also no significant narrowings of the neural foramen     Impression:     Lumbar degenerative disc disease and spondylosis level by level discussed above.                  Objective:          Physical Exam  General: Morbidlly obese  A&O x 3; No anxiety/depression; NAD  Mental Status: Oriented to person, palce and time. Displays appropriate mood & affect.  Head: Norm cephalic and atraumatic  Neck: full ROM without deficits bilateral movements Land R, flexion and extension. No cervical paraspinal banding.   Eyes: normal conjunctiva, normal lids, normal pupils  ENT and mouth: normal external ear, nose, and no lesions noted on the lips.  Respiratory: Symmetrical, Unlabored  Cardiovascular: normal S1/S2. Normal rate and Rhythm. + swelling both  ankles  Abdomen: Pendulous     Extremities:  Gen: No cyanosis or tenderness to palpation bilateral LE  Skin: Warm, pink, dry, no rashes, no lesions on the lumbar spine  Strength: 4/5 motor strength bilateral LE  ROM: Full ROM in right knee. Left knee weakness. Bilateral ankles without pain or instability.     Neuro:  Gait: unable to stand due to pain right leg and weakness to leg. Unable to  gait. Presents in wheelchair. Normal toe and heel raise  DTR's: 2+ in bilateral patellar,  Sensory: Intact to light touch bilateral  upper and lower extremities with numbness to right knee only.      Spine:notable tenderness to palpation over bilateral low back and buttocks. .     L-spine ROM: Normal ROM to flexion, Limited ROM and pain with extension, bilateral rotation     Straight Leg Raise: Negative bilaterally  SI Joint: No tenderness to palpation bilaterally,  Sensory: Intact to light touch bilateral upper and lower extremities           Assessment:     A/P patient had good relief to right sciatica and has been more mobile using Rollator  now instead of her wheelchair. She still has sciatica in L4 dermatome region to bilateral legs and would like to repeat TFESI to bilateral L4.   No blood thinners  Follow up with me post op  Suggested dietician consult with PCP for weight loss. SHe will follow up.     Encounter Diagnoses   Name Primary?    Lumbar disc displacement without myelopathy Yes    Lumbar radiculopathy     Lumbar degenerative disc disease     Chronic bilateral low back pain, unspecified whether sciatica present          Plan:       Eleanor was seen today for low-back pain.    Diagnoses and all orders for this visit:    Lumbar disc displacement without myelopathy    Lumbar radiculopathy    Lumbar degenerative disc disease    Chronic bilateral low back pain, unspecified whether sciatica present               Past Medical History:   Diagnosis Date    Anxiety disorder, unspecified     Asthma     Depression      Digestive disorder     reflux    Hiatal hernia     Hyperlipemia     Neuropathy     feet    PVD (peripheral vascular disease)     Restless leg syndrome     Short of breath on exertion     Sleep apnea, unspecified     cpap       Past Surgical History:   Procedure Laterality Date    APPENDECTOMY      CHOLECYSTECTOMY      COLONOSCOPY  01/13/2020    ESOPHAGOGASTRODUODENOSCOPY  06/10/2016    INJECTION OF ANESTHETIC AGENT AROUND MEDIAL BRANCH NERVES INNERVATING LUMBAR FACET JOINT Right 02/22/2023    Procedure: Block-nerve-medial branch-lumbar;  Surgeon: Cassi Muñoz MD;  Location: [facility] OR;  Service: Pain Management;  Laterality: Right;  L4/L5 and L5/S1    JOINT REPLACEMENT Right 03-    total shoulder    OPEN REDUCTION AND INTERNAL FIXATION (ORIF) OF FRACTURE OF DISTAL RADIUS Right 07/14/2022    Procedure: ORIF, FRACTURE, RADIUS, DISTAL;  Surgeon: Emery Morel MD;  Location: [facility];  Service: Orthopedics;  Laterality: Right;    PROCTOSCOPY  03/23/2016    TRANSFORAMINAL EPIDURAL INJECTION OF STEROID Right 4/5/2023    Procedure: Injection,steroid,epidural,transforaminal approach;  Surgeon: Cassi Muñoz MD;  Location: [facility];  Service: Pain Management;  Laterality: Right;  TFESI on Right at L3/L4       Family History   Problem Relation Age of Onset    Heart failure Mother     Heart disease Mother     Osteoporosis Sister     Osteoporosis Brother     Gout Brother     Arthritis Father        Social History     Socioeconomic History    Marital status:    Tobacco Use    Smoking status: Never    Smokeless tobacco: Never    Tobacco comments:     None   Substance and Sexual Activity    Alcohol use: Never    Drug use: Never    Sexual activity: Yes     Partners: Male     Birth control/protection: Partner-Vasectomy       Current Outpatient Medications   Medication Sig Dispense Refill    acetaminophen (TYLENOL) 500 MG tablet Take 1,000 mg by mouth nightly.      albuterol (PROVENTIL/VENTOLIN HFA) 90  mcg/actuation inhaler Inhale into the lungs every 4 (four) hours as needed.      alendronate (FOSAMAX) 70 MG tablet Take 1 tablet (70 mg total) by mouth once a week. 12 tablet 2    amLODIPine (NORVASC) 2.5 MG tablet Take 2.5 mg by mouth 2 (two) times daily.      baclofen (LIORESAL) 10 MG tablet Take 1 tablet (10 mg total) by mouth 3 (three) times daily. 270 tablet 2    budesonide (RINOCORT AQUA) 32 mcg/actuation nasal spray 1 spray by Nasal route once daily at 6am.      buPROPion (WELLBUTRIN XL) 300 MG 24 hr tablet Take 1 tablet (300 mg total) by mouth once daily. 30 tablet 3    dexbrompheniramine maleate (ALA-HIST IR) 2 mg Tab Ala-Hist IR Take 1 tablet (oral) every 6 hours PRN - Congestion for 5 days [id number] tablet every 6 hours oral 5 days suspended 2 mg      docusate sodium 100 mg capsule Take 100 mg by mouth daily as needed.      ergocalciferol (ERGOCALCIFEROL) 50,000 unit Cap Take 1 capsule (50,000 Units total) by mouth every 7 days. 12 capsule 2    esomeprazole (NEXIUM) 40 MG capsule Take 1 capsule (40 mg total) by mouth before breakfast. 90 capsule 2    fluticasone propionate (FLONASE) 50 mcg/actuation nasal spray 2 sprays by Each Nostril route once daily at 6am.      furosemide (LASIX) 20 MG tablet Take 1 tablet (20 mg total) by mouth daily as needed (swelling). 30 tablet 6    loratadine (CLARITIN) 10 mg tablet Take 1 tablet (10 mg total) by mouth once daily. 90 tablet 2    potassium chloride (KLOR-CON) 10 MEQ TbSR Take 10 mEq by mouth daily as needed.      pramipexole (MIRAPEX) 0.5 MG tablet Take 1 tablet (0.5 mg total) by mouth every evening. 90 tablet 2    ranolazine (RANEXA) 500 MG Tb12 Take 500 mg by mouth 2 (two) times daily.      rosuvastatin (CRESTOR) 10 MG tablet Take 1 tablet (10 mg total) by mouth every evening. 90 tablet 2    sertraline (ZOLOFT) 100 MG tablet Take 2 tablets (200 mg total) by mouth once daily. (Patient taking differently: Take 200 mg by mouth every evening.) 180 tablet 2     traZODone (DESYREL) 50 MG tablet May take 25mg to 50mg at bedtime as needed for insomnia (Patient taking differently: Take 50 mg by mouth every evening. May take 25mg to 50mg at bedtime as needed for insomnia) 30 tablet 11    VENTOLIN HFA 90 mcg/actuation inhaler Inhale 2 puffs into the lungs every 6 (six) hours as needed for Shortness of Breath. 18 g 2     No current facility-administered medications for this visit.       Review of patient's allergies indicates:   Allergen Reactions    Nsaids (non-steroidal anti-inflammatory drug) Nausea And Vomiting

## 2023-05-09 RX ORDER — FOLIC ACID 1 MG/1
1 TABLET ORAL DAILY
COMMUNITY

## 2023-05-09 RX ORDER — CHOLECALCIFEROL (VITAMIN D3) 25 MCG
1000 TABLET ORAL DAILY
COMMUNITY
End: 2024-02-09

## 2023-05-14 ENCOUNTER — PATIENT MESSAGE (OUTPATIENT)
Dept: ADMINISTRATIVE | Facility: OTHER | Age: 68
End: 2023-05-14
Payer: MEDICARE

## 2023-05-15 ENCOUNTER — ANESTHESIA EVENT (OUTPATIENT)
Dept: SURGERY | Facility: HOSPITAL | Age: 68
End: 2023-05-15
Payer: MEDICARE

## 2023-05-15 ENCOUNTER — PATIENT MESSAGE (OUTPATIENT)
Dept: ADMINISTRATIVE | Facility: OTHER | Age: 68
End: 2023-05-15
Payer: MEDICARE

## 2023-05-16 ENCOUNTER — PATIENT MESSAGE (OUTPATIENT)
Dept: ADMINISTRATIVE | Facility: OTHER | Age: 68
End: 2023-05-16
Payer: MEDICARE

## 2023-05-17 ENCOUNTER — ANESTHESIA (OUTPATIENT)
Dept: SURGERY | Facility: HOSPITAL | Age: 68
End: 2023-05-17
Payer: MEDICARE

## 2023-05-17 ENCOUNTER — HOSPITAL ENCOUNTER (OUTPATIENT)
Facility: HOSPITAL | Age: 68
Discharge: HOME | End: 2023-05-17
Attending: ANESTHESIOLOGY | Admitting: ANESTHESIOLOGY
Payer: MEDICARE

## 2023-05-17 VITALS
HEART RATE: 74 BPM | OXYGEN SATURATION: 96 % | TEMPERATURE: 97 F | BODY MASS INDEX: 53.92 KG/M2 | HEIGHT: 62 IN | SYSTOLIC BLOOD PRESSURE: 132 MMHG | RESPIRATION RATE: 20 BRPM | WEIGHT: 293 LBS | DIASTOLIC BLOOD PRESSURE: 70 MMHG

## 2023-05-17 DIAGNOSIS — G89.29 CHRONIC BACK PAIN GREATER THAN 3 MONTHS DURATION: ICD-10-CM

## 2023-05-17 DIAGNOSIS — M54.9 CHRONIC BACK PAIN GREATER THAN 3 MONTHS DURATION: ICD-10-CM

## 2023-05-17 PROCEDURE — 64483 NJX AA&/STRD TFRM EPI L/S 1: CPT | Mod: LT | Performed by: ANESTHESIOLOGY

## 2023-05-17 PROCEDURE — 25000003 PHARM REV CODE 250: Performed by: NURSE ANESTHETIST, CERTIFIED REGISTERED

## 2023-05-17 PROCEDURE — 64483 NJX AA&/STRD TFRM EPI L/S 1: CPT | Mod: 50,,, | Performed by: ANESTHESIOLOGY

## 2023-05-17 PROCEDURE — 64483 PR EPIDURAL INJ, ANES/STEROID, TRANSFORAMINAL, LUMB/SACR, SNGL LEVL: ICD-10-PCS | Mod: 50,,, | Performed by: ANESTHESIOLOGY

## 2023-05-17 PROCEDURE — 63600175 PHARM REV CODE 636 W HCPCS: Performed by: ANESTHESIOLOGY

## 2023-05-17 PROCEDURE — D9220A PRA ANESTHESIA: ICD-10-PCS | Mod: ANES,,, | Performed by: STUDENT IN AN ORGANIZED HEALTH CARE EDUCATION/TRAINING PROGRAM

## 2023-05-17 PROCEDURE — 63600175 PHARM REV CODE 636 W HCPCS: Performed by: NURSE ANESTHETIST, CERTIFIED REGISTERED

## 2023-05-17 PROCEDURE — D9220A PRA ANESTHESIA: Mod: ANES,,, | Performed by: STUDENT IN AN ORGANIZED HEALTH CARE EDUCATION/TRAINING PROGRAM

## 2023-05-17 PROCEDURE — 37000008 HC ANESTHESIA 1ST 15 MINUTES: Performed by: ANESTHESIOLOGY

## 2023-05-17 PROCEDURE — D9220A PRA ANESTHESIA: Mod: CRNA,,, | Performed by: NURSE ANESTHETIST, CERTIFIED REGISTERED

## 2023-05-17 PROCEDURE — 25000003 PHARM REV CODE 250: Performed by: ANESTHESIOLOGY

## 2023-05-17 PROCEDURE — D9220A PRA ANESTHESIA: ICD-10-PCS | Mod: CRNA,,, | Performed by: NURSE ANESTHETIST, CERTIFIED REGISTERED

## 2023-05-17 RX ORDER — LIDOCAINE HYDROCHLORIDE 10 MG/ML
INJECTION, SOLUTION EPIDURAL; INFILTRATION; INTRACAUDAL; PERINEURAL
Status: DISCONTINUED | OUTPATIENT
Start: 2023-05-17 | End: 2023-05-17 | Stop reason: HOSPADM

## 2023-05-17 RX ORDER — PROPOFOL 10 MG/ML
VIAL (ML) INTRAVENOUS
Status: DISCONTINUED | OUTPATIENT
Start: 2023-05-17 | End: 2023-05-17

## 2023-05-17 RX ORDER — BUPIVACAINE HYDROCHLORIDE 2.5 MG/ML
INJECTION, SOLUTION EPIDURAL; INFILTRATION; INTRACAUDAL
Status: DISCONTINUED
Start: 2023-05-17 | End: 2023-05-17 | Stop reason: HOSPADM

## 2023-05-17 RX ORDER — RANOLAZINE 500 MG/1
500 TABLET, EXTENDED RELEASE ORAL 2 TIMES DAILY
COMMUNITY

## 2023-05-17 RX ORDER — LIDOCAINE HYDROCHLORIDE 10 MG/ML
INJECTION, SOLUTION EPIDURAL; INFILTRATION; INTRACAUDAL; PERINEURAL
Status: COMPLETED
Start: 2023-05-17 | End: 2023-05-17

## 2023-05-17 RX ORDER — DEXAMETHASONE SODIUM PHOSPHATE 10 MG/ML
INJECTION INTRAMUSCULAR; INTRAVENOUS
Status: DISCONTINUED | OUTPATIENT
Start: 2023-05-17 | End: 2023-05-17 | Stop reason: HOSPADM

## 2023-05-17 RX ORDER — BUPIVACAINE HYDROCHLORIDE 2.5 MG/ML
INJECTION, SOLUTION EPIDURAL; INFILTRATION; INTRACAUDAL
Status: DISCONTINUED | OUTPATIENT
Start: 2023-05-17 | End: 2023-05-17 | Stop reason: HOSPADM

## 2023-05-17 RX ORDER — LIDOCAINE HYDROCHLORIDE 10 MG/ML
INJECTION, SOLUTION EPIDURAL; INFILTRATION; INTRACAUDAL; PERINEURAL
Status: DISCONTINUED | OUTPATIENT
Start: 2023-05-17 | End: 2023-05-17

## 2023-05-17 RX ORDER — DEXAMETHASONE SODIUM PHOSPHATE 10 MG/ML
INJECTION INTRAMUSCULAR; INTRAVENOUS
Status: DISCONTINUED
Start: 2023-05-17 | End: 2023-05-17 | Stop reason: HOSPADM

## 2023-05-17 RX ADMIN — LIDOCAINE HYDROCHLORIDE 3 ML: 10 INJECTION, SOLUTION EPIDURAL; INFILTRATION; INTRACAUDAL; PERINEURAL at 09:05

## 2023-05-17 RX ADMIN — PROPOFOL 150 MG: 10 INJECTION, EMULSION INTRAVENOUS at 09:05

## 2023-05-17 NOTE — OP NOTE
Procedure:    Left L4  transforaminal epidural steroid injection    Right L4  transforaminal epidural steroid injection    Pre-Procedure Diagnoses:  Chronic back pain greater than 3 months  Lumbar degenerative disc disease  Lumbar radiculopathy  Lumbar disc displacement    Post-Procedure Diagnoses:  Chronic back pain greater than 3 months  Lumbar degenerative disc disease  Lumbar radiculopathy  Lumbar disc displacement    Anesthesia:  Local and MAC    Estimated Blood Loss:  < 2 ML    Consent:  The procedure, risks, benefits, and alternatives were discussed with the patient.  The patient voiced understanding and fully informed written consent was obtained.    Description of the Procedure:  The patient was taken to the operating room and placed in the prone position. The skin overlying the lumbar spine was prepped with Chloraprep and draped in the usual sterile fashion.  An oblique fluoroscopic view was obtained on the left side at L4, with the superior articular process of the inferior vertebral body aligned with the pedicle.  Skin anesthesia was achieved using 2 mL of lidocaine 1%.  A 22-gauge 5 -inch Quinke spinal needle was inserted and advanced under intermittent fluoroscopic views into the epidural space. Proper needle position was confirmed under AP, oblique, and lateral fluoroscopic views.  Negative aspiration for blood or CSF was confirmed. 1 mL of contrast was injected, which revealed spread into the epidural space.  Then a combination of 5 mg of dexamethasone with 1 mL of 0.25% bupivacaine was easily injected.   There was no pain on injection. The needle was removed intact and bleeding was nil.  The same procedure was repeated in identical fashion on the right side at L4.  Sterile bandages were applied. The patient was taken to the recovery room for further observation in stable condition. The patient was then discharged home with no complications.

## 2023-05-17 NOTE — ANESTHESIA POSTPROCEDURE EVALUATION
Anesthesia Post Evaluation    Patient: Eleanor Buckner    Procedure(s) Performed: Procedure(s) (LRB):  Injection,steroid,epidural,transforaminal approach (Bilateral)    Final Anesthesia Type: general      Patient location during evaluation: PACU  Patient participation: Yes- Able to Participate  Level of consciousness: awake and alert  Post-procedure vital signs: reviewed and stable  Pain management: adequate  Airway patency: patent    PONV status at discharge: No PONV  Anesthetic complications: no      Respiratory status: unassisted  Hydration status: euvolemic  Follow-up not needed.          Vitals Value Taken Time   /70 05/17/23 1015   Temp nl 05/17/23 1308   Pulse 74 05/17/23 1015   Resp nl 05/17/23 1308   SpO2 96 % 05/17/23 1015         No case tracking events are documented in the log.      Pain/Nicko Score: Nicko Score: 10 (5/17/2023  9:48 AM)

## 2023-05-17 NOTE — ANESTHESIA PREPROCEDURE EVALUATION
05/17/2023  Eleanor Buckner is a 68 y.o., female.    Other Medical History   Hyperlipemia Depression   Sleep apnea, unspecified Hiatal hernia   Short of breath on exertion Digestive disorder   Restless leg syndrome Anxiety disorder, unspecified   Neuropathy Asthma   PVD (peripheral vascular disease)      Surgical History  COLONOSCOPY ESOPHAGOGASTRODUODENOSCOPY   PROCTOSCOPY APPENDECTOMY   CHOLECYSTECTOMY OPEN REDUCTION AND INTERNAL FIXATION (ORIF) OF FRACTURE OF DISTAL RADIUS   JOINT REPLACEMENT INJECTION OF ANESTHETIC AGENT AROUND MEDIAL BRANCH NERVES INNERVATING LUMBAR FACET JOINT   TRANSFORAMINAL EPIDURAL INJECTION OF STEROID        Pre-op Assessment    I have reviewed the Patient Summary Reports.     I have reviewed the Nursing Notes. I have reviewed the NPO Status.   I have reviewed the Medications.     Review of Systems  Anesthesia Hx:  No problems with previous Anesthesia    Social:  Non-Smoker    Hematology/Oncology:  Hematology Normal   Oncology Normal     EENT/Dental:EENT/Dental Normal   Cardiovascular:   Exercise tolerance: good Hypertension hyperlipidemia  Functional Capacity good / => 4 METS    Pulmonary:   Shortness of breath Sleep Apnea    Renal/:  Renal/ Normal     Hepatic/GI:   Hiatal Hernia, GERD    Musculoskeletal:   Chronic back pain   Spondylosis  Radiculopathy Spine Disorders:    Neurological:   Neuromuscular Disease,    Endocrine:  Morbid Obesity / BMI > 40  Dermatological:  Skin Normal    Psych:   Psychiatric History depression          Physical Exam  General: Well nourished, Cooperative, Alert and Oriented    Airway:  Mallampati: III   Mouth Opening: Small, but > 3cm  Tongue: Normal, Large  Neck ROM: Normal ROM    Dental:  Dentures        Anesthesia Plan  Type of Anesthesia, risks & benefits discussed:    Anesthesia Type: Gen Natural Airway  Intra-op Monitoring Plan: Standard  ASA Monitors  Post Op Pain Control Plan: IV/PO Opioids PRN  Induction:  IV  Informed Consent: Informed consent signed with the Patient and all parties understand the risks and agree with anesthesia plan.  All questions answered. Patient consented to blood products? No  ASA Score: 3  Day of Surgery Review of History & Physical: H&P Update referred to the surgeon/provider.    Ready For Surgery From Anesthesia Perspective.     .

## 2023-05-17 NOTE — TRANSFER OF CARE
"Anesthesia Transfer of Care Note    Patient: Eleanor Buckner    Procedure(s) Performed: Procedure(s) (LRB):  Injection,steroid,epidural,transforaminal approach (Bilateral)    Patient location: OPS    Anesthesia Type: general    Transport from OR: Transported from OR on room air with adequate spontaneous ventilation    Post pain: adequate analgesia    Post assessment: no apparent anesthetic complications    Post vital signs: stable    Level of consciousness: awake, alert and oriented    Nausea/Vomiting: no nausea/vomiting    Complications: none    Transfer of care protocol was followed      Last vitals:   Visit Vitals  BP (!) 157/75   Pulse 71   Temp 36.2 °C (97.1 °F) (Tympanic)   Resp 20   Ht 5' 2" (1.575 m)   Wt (!) 163.3 kg (360 lb)   SpO2 96%   Breastfeeding No   BMI 65.84 kg/m²     "

## 2023-05-23 ENCOUNTER — PATIENT MESSAGE (OUTPATIENT)
Dept: RESEARCH | Facility: HOSPITAL | Age: 68
End: 2023-05-23
Payer: MEDICARE

## 2023-05-31 ENCOUNTER — OFFICE VISIT (OUTPATIENT)
Dept: PAIN MEDICINE | Facility: CLINIC | Age: 68
End: 2023-05-31
Payer: MEDICARE

## 2023-05-31 VITALS
TEMPERATURE: 98 F | HEART RATE: 75 BPM | SYSTOLIC BLOOD PRESSURE: 120 MMHG | BODY MASS INDEX: 53.92 KG/M2 | DIASTOLIC BLOOD PRESSURE: 52 MMHG | HEIGHT: 62 IN | WEIGHT: 293 LBS

## 2023-05-31 DIAGNOSIS — E66.01 MORBID OBESITY: ICD-10-CM

## 2023-05-31 DIAGNOSIS — M51.26 LUMBAR DISC DISPLACEMENT WITHOUT MYELOPATHY: Primary | ICD-10-CM

## 2023-05-31 DIAGNOSIS — M54.16 LUMBAR RADICULOPATHY: ICD-10-CM

## 2023-05-31 PROCEDURE — 99214 OFFICE O/P EST MOD 30 MIN: CPT | Mod: ,,, | Performed by: NURSE PRACTITIONER

## 2023-05-31 PROCEDURE — 99214 PR OFFICE/OUTPT VISIT, EST, LEVL IV, 30-39 MIN: ICD-10-PCS | Mod: ,,, | Performed by: NURSE PRACTITIONER

## 2023-05-31 NOTE — PROGRESS NOTES
Dictation #1  MRN:[id number]  [facility]:[id number] Subjective:      Patient ID: Eleanor Buckner is a 68 y.o. female.    Chief Complaint: Low-back Pain (Post-op bilateral L4 TFESI, pt states she received great relief which has lasted to date, c/o bilateral leg weakness, no P.T., pt denies pain today)    Referred by: No ref. provider found     HPI: Patient presents as a follow-up for bilateral sciatica after completing a transforaminal epidural steroid injection to bilateral L4 on 05/17/2023.  Patient stated she received excellent pain relief which is still ongoing.  She has not completed physical therapy.  Pain score is 0/10. Both of her legs are still weak with prolonged standing.  Satisfied with her pain control and plans to work with a dietitian for weight loss.     Current back pain located to bilateral lower back, radiates to bilateral buttocks with R > L , then  radiates down right leg down to lateral thigh and stops just above right  knee. Pain less intense. Attributes right leg pain interfering with her inability to walk.  No pain on left leg. No numbness to either foot.  No diabetes history reports lumbar. She has osteopenia and takes Calcium, Vitamin D and Fosamax for prior prevention of fractures.        Interventional Pain History  05/17/2023: Transforaminal epidural steroid injection bilateral L4  04/05/2023: Right L3 and right L4 transforaminal epidural steroid injections    Pertinent Labs 2022:  Normal GFR + Crea + platelets and neutrophils    ROS:  Low back pain    MRI lumbar spine 01/09/2023:   FINDINGS:  For the purpose of this report, the most inferior well developed intervertebral disc space is presumed to represent L5-S1. Lumbar vertebrae stature is unremarkable alignment is preserved.  There is a meningioma which involves T11 vertebral body.  There are no acute marrow edematous signals.  The visualized thoracic cord is unremarkable. The conus medullaris terminates at L1-L2.  Disc segmental analysis  is given below:     At L1-L2, disc is unremarkable.  There is moderate central canal stenosis which is caused by facet arthropathy.  There are no narrowings of the neural foramen.     At L2-L3, disc is unremarkable.  Central canal is not stenosed and there are no narrowings of the neural foramen     At L3-L4, there is broad disc bulge which mildly compresses the ventral thecal sac.  Bilateral facet arthropathy and ligamentum flavum thickening.  These findings combine to cause moderate central canal stenosis.  There are no narrowings of the neural foramen.     At L4-L5, there is disc bulge which slightly flattens the ventral thecal sac.  There is ligamentum flavum thickening and facet arthropathy.  These findings result in moderate central canal stenosis.  There are no narrowings of the neural foramen.     At L5-S1, there is bulging of annulus fibrosis which slightly flattens the ventral thecal sac bilateral facet arthropathy.  There is no significant central canal stenosis.  There also no significant narrowings of the neural foramen     Impression:     Lumbar degenerative disc disease and spondylosis level by level discussed above.                    Objective:          Physical Exam  General: Morbidlly obese  A&O x 3; No anxiety/depression; NAD  Mental Status: Oriented to person, palce and time. Displays appropriate mood & affect.  Head: Norm cephalic and atraumatic  Neck: full ROM without deficits bilateral movements Land R, flexion and extension. No cervical paraspinal banding.   Eyes: normal conjunctiva, normal lids, normal pupils  ENT and mouth: normal external ear, nose, and no lesions noted on the lips.  Respiratory: Symmetrical, Unlabored  Cardiovascular: normal S1/S2. Normal rate and Rhythm. + swelling both ankles  Abdomen: Pendulous     Extremities:  Gen: No cyanosis or tenderness to palpation bilateral LE  Skin: Warm, pink, dry, no rashes, no lesions on the lumbar spine  Strength: 4/5 motor strength  bilateral LE  ROM: Full ROM in right knee. Left knee weakness. Bilateral ankles without pain or instability.     Neuro:  Gait: antalgic gait improved since last visit as she was not able to stand on her right leg. Ambulates with rolling walker. Guarded  gait.  Normal toe and heel raise  DTR's: 2+ in bilateral patellar,  Sensory: Intact to light touch bilateral  upper and lower extremities with numbness to right knee only.        Past Medical History:   Diagnosis Date    Anxiety disorder, unspecified     Asthma     Depression     Digestive disorder     reflux    Fibromyalgia     Hiatal hernia     Hyperlipemia     Neuropathy     feet    PVD (peripheral vascular disease)     Restless leg syndrome     Short of breath on exertion     Sleep apnea, unspecified     cpap       Past Surgical History:   Procedure Laterality Date    APPENDECTOMY      CHOLECYSTECTOMY      COLONOSCOPY  01/13/2020    ESOPHAGOGASTRODUODENOSCOPY  06/10/2016    INJECTION OF ANESTHETIC AGENT AROUND MEDIAL BRANCH NERVES INNERVATING LUMBAR FACET JOINT Right 02/22/2023    Procedure: Block-nerve-medial branch-lumbar;  Surgeon: Cassi Muñoz MD;  Location: [facility];  Service: Pain Management;  Laterality: Right;  L4/L5 and L5/S1    JOINT REPLACEMENT Right 03-    total shoulder    OPEN REDUCTION AND INTERNAL FIXATION (ORIF) OF FRACTURE OF DISTAL RADIUS Right 07/14/2022    Procedure: ORIF, FRACTURE, RADIUS, DISTAL;  Surgeon: Emery Morel MD;  Location: [facility];  Service: Orthopedics;  Laterality: Right;    PROCTOSCOPY  03/23/2016    TRANSFORAMINAL EPIDURAL INJECTION OF STEROID Right 4/5/2023    Procedure: Injection,steroid,epidural,transforaminal approach;  Surgeon: Cassi Muñoz MD;  Location: [facility];  Service: Pain Management;  Laterality: Right;  TFESI on Right at L3/L4    TRANSFORAMINAL EPIDURAL INJECTION OF STEROID Bilateral 5/17/2023    Procedure: Injection,steroid,epidural,transforaminal approach;  Surgeon: Cassi Muñoz MD;   Location: [facility] OR;  Service: Pain Management;  Laterality: Bilateral;  Transforaminal Epidural Steroid Injection  Bilateral L4       Family History   Problem Relation Age of Onset    Heart failure Mother     Heart disease Mother     Osteoporosis Sister     Osteoporosis Brother     Gout Brother     Arthritis Father        Social History     Socioeconomic History    Marital status:    Tobacco Use    Smoking status: Never    Smokeless tobacco: Never    Tobacco comments:     None   Substance and Sexual Activity    Alcohol use: Never    Drug use: Never    Sexual activity: Yes     Partners: Male     Birth control/protection: Partner-Vasectomy       Current Outpatient Medications   Medication Sig Dispense Refill    acetaminophen (TYLENOL) 500 MG tablet Take 1,000 mg by mouth nightly.      albuterol (PROVENTIL/VENTOLIN HFA) 90 mcg/actuation inhaler Inhale into the lungs every 4 (four) hours as needed.      alendronate (FOSAMAX) 70 MG tablet Take 1 tablet (70 mg total) by mouth once a week. (Patient taking differently: Take 70 mg by mouth every 7 days.) 12 tablet 2    amLODIPine (NORVASC) 2.5 MG tablet Take 2.5 mg by mouth 2 (two) times daily.      baclofen (LIORESAL) 10 MG tablet Take 1 tablet (10 mg total) by mouth 3 (three) times daily. (Patient taking differently: Take 10 mg by mouth nightly.) 270 tablet 2    budesonide (RINOCORT AQUA) 32 mcg/actuation nasal spray 1 spray by Nasal route as needed.      buPROPion (WELLBUTRIN XL) 300 MG 24 hr tablet Take 1 tablet (300 mg total) by mouth once daily. 30 tablet 3    dexbrompheniramine maleate (ALA-HIST IR) 2 mg Tab Take 1 tablet by mouth as needed.      docusate sodium 100 mg capsule Take 100 mg by mouth daily as needed.      ergocalciferol (ERGOCALCIFEROL) 50,000 unit Cap Take 1 capsule (50,000 Units total) by mouth every 7 days. 12 capsule 2    esomeprazole (NEXIUM) 40 MG capsule Take 1 capsule (40 mg total) by mouth before breakfast. 90 capsule 2    fluticasone  propionate (FLONASE) 50 mcg/actuation nasal spray 2 sprays by Each Nostril route as needed.      folic acid (FOLVITE) 1 MG tablet Take 1 mg by mouth once daily.      furosemide (LASIX) 20 MG tablet Take 1 tablet (20 mg total) by mouth daily as needed (swelling). 30 tablet 6    loratadine (CLARITIN) 10 mg tablet Take 1 tablet (10 mg total) by mouth once daily. 90 tablet 2    potassium chloride (KLOR-CON) 10 MEQ TbSR Take 10 mEq by mouth daily as needed.      pramipexole (MIRAPEX) 0.5 MG tablet Take 1 tablet (0.5 mg total) by mouth every evening. 90 tablet 2    ranolazine (RANEXA) 500 MG Tb12 Take 500 mg by mouth 2 (two) times daily.      ranolazine (RANEXA) 500 MG Tb12 Take 500 mg by mouth 2 (two) times daily.      rosuvastatin (CRESTOR) 10 MG tablet Take 1 tablet (10 mg total) by mouth every evening. 90 tablet 2    sertraline (ZOLOFT) 100 MG tablet Take 2 tablets (200 mg total) by mouth once daily. (Patient taking differently: Take 200 mg by mouth every evening.) 180 tablet 2    traZODone (DESYREL) 50 MG tablet May take 25mg to 50mg at bedtime as needed for insomnia (Patient taking differently: Take 50 mg by mouth every evening. May take 25mg to 50mg at bedtime as needed for insomnia) 30 tablet 11    VENTOLIN HFA 90 mcg/actuation inhaler Inhale 2 puffs into the lungs every 6 (six) hours as needed for Shortness of Breath. 18 g 2    vitamin D (VITAMIN D3) 1000 units Tab Take 1,000 Units by mouth once daily.       No current facility-administered medications for this visit.       Review of patient's allergies indicates:   Allergen Reactions    Nsaids (non-steroidal anti-inflammatory drug) Nausea And Vomiting     w back and buttocks. .     L-spine ROM: Normal ROM to flexion, Limited ROM and pain with extension, bilateral rotation     Straight Leg Raise: Negative bilaterally  SI Joint: No tenderness to palpation bilaterally,  Sensory: Intact to light touch bilateral upper and lower extremities            Assessment:      Patient had excellent pain control with bilateral sciatica after completing her bilateral L4 epidural steroid injections 05/17/2023.  She is working with her cardiologist and dietitian to lose weight.  She is made remarkable strides since being seen for sciatica.  This was the 1st time she was able to ambulate with a Rollator.  During prior office visits she was only able to be pushed or push herself with a wheelchair.  Commended her on her plans to lose weight and current pain control.  She is not interested in physical therapy at this time and would like to lose more weight 1st.    Requested follow-up appointment with me in 3 months to evaluate bilateral sciatica.      Plan:       Eleanor was seen today for low-back pain.    Diagnoses and all orders for this visit:    Lumbar disc displacement without myelopathy    Lumbar radiculopathy    Morbid obesity

## 2023-09-08 ENCOUNTER — OFFICE VISIT (OUTPATIENT)
Dept: CARDIOLOGY | Facility: CLINIC | Age: 68
End: 2023-09-08
Payer: MEDICARE

## 2023-09-08 DIAGNOSIS — E78.5 HYPERLIPIDEMIA LDL GOAL <100: Primary | Chronic | ICD-10-CM

## 2023-09-08 DIAGNOSIS — G47.33 OSA ON CPAP: Chronic | ICD-10-CM

## 2023-09-08 DIAGNOSIS — R06.09 DYSPNEA ON EXERTION: ICD-10-CM

## 2023-09-08 PROCEDURE — 99214 OFFICE O/P EST MOD 30 MIN: CPT | Mod: 95,,, | Performed by: NURSE PRACTITIONER

## 2023-09-08 PROCEDURE — 99214 PR OFFICE/OUTPT VISIT, EST, LEVL IV, 30-39 MIN: ICD-10-PCS | Mod: 95,,, | Performed by: NURSE PRACTITIONER

## 2023-09-08 NOTE — PROGRESS NOTES
This is a telemedicine note. Patient was treated using telemedicine, real time audio and video, according to [facility] protocols. IJose Luis FNP, conducted the visit from the [facility]. The patient participated in the visit at a non-[facility] location selected by the patient, identified below. I am licensed in the state where the patient stated they are located. The patient stated that they understood and accepted the privacy and security risks to their information at their location. This visit is not recorded.    Patient was located at the patient's home.       CHIEF COMPLAINT:   Chief Complaint   Patient presents with    F/U 3 month visit     Patient c/o having SOB on exertion relieved with rest                                                  HPI:  Eleanor Buckner 68 y.o. female with a past medical history of Morbid Obesity and HLD presents for follow up and results of testing via telemedicine.  Patient completed a Lexiscan Stress Test on 5.13.22 which was abnormal revealing a mild intensity, small sized, fixed perfusion abnormality consistent with scar in the apical wall(s) in the typical distribution of the LAD territory that is being medically managed.  Last clinic visit the patient endorsed increased shortness of breath with exertion.  Echocardiogram obtained 12.6.22 revealed an estimated ejection fraction 55% which is stable from previous Echo in November 2021.    Patient presents today endorsing stable shortness of breath with exertion that has not progressed since last seen.  She states her ADLs remain unchanged but does note that she requires some baseline assistance with showering.  She denies any chest pain, orthopnea, PND, palpitations, lightheadedness or syncope.  She continues to endorse occasional peripheral edema that is managed with her p.r.n. diuretic and compression pumps. Of note, the patient reports that she was evaluated by Dr. Young at [facility] and was directed to  follow up as needed following her procedure. She states that she is generally sedentary due to chronic back pain which limits her activity.  She reports compliance with her current medications and is tolerating without issue.       CARDIAC TESTING:  Echo 12.6.22    Interpretation Summary  · Mild tricuspid regurgitation.  · The left ventricle is normal in size with normal systolic function.  · The estimated ejection fraction is 55%.  · Normal right ventricular size with normal right ventricular systolic function.  · Mild left atrial enlargement.  · There is mild pulmonary hypertension.    RLE Venous Insuffiencey Study 10.3.22:  There is no evidence of a right lower extremity DVT.  There was no substantial reflux identified in the visualized veins of the right leg deep system, GSV, saphenofemoral junction, or SSV.     LLE Venous Insuffiencey Study 9.29.22:                                                                                                                                                                         There was a thin linear echogenic filling defect in the femoral vein at the level of the mid thigh. This finding could represent a non-occlusive chronic deep vein thrombus. The femoral vein appears patent and compressible where visualized.   There was no substantial reflux identified in the visualized veins of the left leg deep system, GSV, saphenofemoral junction, or SSV.     Nuclear Stress Test 5.13.22:  Abnormal myocardial perfusion scan.  There is a mild intensity, small sized, fixed perfusion abnormality consistent with scar in the apical wall(s) in the typical distribution of the LAD territory.  There are no other significant perfusion abnormalities.  The gated perfusion images showed an ejection fraction of 64% post stress.  The patient reported no chest pain during the stress test.     Echocardiogram November 2021:  Left ventricular ejection fraction is measured at approximately  55%.  Structurally normal mitral valve.  Structurally trileaflet aortic valve.  No evidence of tricuspid regurgitation.  No evidence of pulmonic regurgitation.  Mildly dilated left atrium.  Normal right atrial size.  Right ventricular cavity is normal in size.  No evidence of a pericardial effusion.  No evidence of pleural effusion.  IVC is normal.    LINDY testing November 2021:  No evidence of significant arterial insufficiency was identified on the study.    Lexiscan 9.19.19:  EF 59% with no wall motion abnormalities  No ischemia, inferior scar    TTE 7.13.19:  Left ventricular ejection fraction measured approximately 55%  Moderate dilated left atrium  There is mild tricuspid regurgitation with estimated RVSP of 36 mmHg  Trace pulmonic regurgitation present                                                                                                                                                                                                                                                                                                                                                                                                                                                                                       Patient Active Problem List   Diagnosis    Osteoporosis    GERD (gastroesophageal reflux disease)    Hyperlipidemia LDL goal <100    Anxiety    Dyspnea on exertion    Class 3 severe obesity due to excess calories with serious comorbidity and body mass index (BMI) of 60.0 to 69.9 in adult    Closed fracture of right distal radius    Peripheral edema    Muscle weakness (generalized)    Bilateral chronic knee pain    Unsteadiness on feet    Multiple falls    Vitamin D deficiency    KI on CPAP    Bilateral lower extremity edema    Impaired fasting glucose    Low back pain with sciatica    Mild depression    Difficulty sleeping     Past Surgical History:   Procedure Laterality Date    APPENDECTOMY       CHOLECYSTECTOMY      COLONOSCOPY  01/13/2020    ESOPHAGOGASTRODUODENOSCOPY  06/10/2016    INJECTION OF ANESTHETIC AGENT AROUND MEDIAL BRANCH NERVES INNERVATING LUMBAR FACET JOINT Right 02/22/2023    Procedure: Block-nerve-medial branch-lumbar;  Surgeon: Cassi Muñoz MD;  Location: [facility];  Service: Pain Management;  Laterality: Right;  L4/L5 and L5/S1    JOINT REPLACEMENT Right 03-    total shoulder    OPEN REDUCTION AND INTERNAL FIXATION (ORIF) OF FRACTURE OF DISTAL RADIUS Right 07/14/2022    Procedure: ORIF, FRACTURE, RADIUS, DISTAL;  Surgeon: Emery Morel MD;  Location: [facility];  Service: Orthopedics;  Laterality: Right;    PROCTOSCOPY  03/23/2016    TRANSFORAMINAL EPIDURAL INJECTION OF STEROID Right 4/5/2023    Procedure: Injection,steroid,epidural,transforaminal approach;  Surgeon: Cassi Muñoz MD;  Location: [facility];  Service: Pain Management;  Laterality: Right;  TFESI on Right at L3/L4    TRANSFORAMINAL EPIDURAL INJECTION OF STEROID Bilateral 5/17/2023    Procedure: Injection,steroid,epidural,transforaminal approach;  Surgeon: Cassi Muñoz MD;  Location: [facility];  Service: Pain Management;  Laterality: Bilateral;  Transforaminal Epidural Steroid Injection  Bilateral L4     Social History     Socioeconomic History    Marital status:    Tobacco Use    Smoking status: Never    Smokeless tobacco: Never    Tobacco comments:     None   Substance and Sexual Activity    Alcohol use: Never    Drug use: Never    Sexual activity: Yes     Partners: Male     Birth control/protection: Partner-Vasectomy        Family History   Problem Relation Age of Onset    Heart failure Mother     Heart disease Mother     Osteoporosis Sister     Osteoporosis Brother     Gout Brother     Arthritis Father      Review of patient's allergies indicates:   Allergen Reactions    Nsaids (non-steroidal anti-inflammatory drug) Nausea And Vomiting         ROS:  Review of Systems   Respiratory:  Positive for shortness  of breath.    Cardiovascular:  Positive for leg swelling.                                                                                                                                                                                Negative except as stated in the history of present illness. See HPI for details.    PHYSICAL EXAM:  There were no vitals taken for this visit.    Physical Exam  Physical Exam: LIMITED DUE TO TELEMEDICINE RESTRICTIONS.  General: Alert and oriented, No acute distress.  Head: Normocephalic, Atraumatic.  Eye: Sclera non-icteric.  Neck/Thyroid:  Full range of motion.  Respiratory: Non-labored respirations, Symmetrical chest wall expansion.  Musculoskeletal: Normal range of motion.  Integumentary:  No visible suspicious lesions or rashes. No diaphoresis.   Neurologic: No focal deficits  Psychiatric: Normal interaction, Coherent speech, Euthymic mood, Appropriate affect     Current Outpatient Medications   Medication Instructions    acetaminophen (TYLENOL) 1,000 mg, Oral, Nightly    albuterol (PROVENTIL/VENTOLIN HFA) 90 mcg/actuation inhaler Inhalation, Every 4 hours PRN    alendronate (FOSAMAX) 70 mg, Oral, Weekly    amLODIPine (NORVASC) 2.5 mg, Oral, 2 times daily    baclofen (LIORESAL) 10 mg, Oral, 3 times daily    budesonide (RINOCORT AQUA) 32 mcg/actuation nasal spray 1 spray, Nasal, As needed (PRN)    buPROPion (WELLBUTRIN XL) 300 mg, Oral, Daily    dexbrompheniramine maleate (ALA-HIST IR) 2 mg Tab 1 tablet, Oral, As needed (PRN)    docusate sodium 100 mg, Oral, Daily PRN    ergocalciferol (ERGOCALCIFEROL) 50,000 Units, Oral, Every 7 days    esomeprazole (NEXIUM) 40 mg, Oral, Before breakfast    fluticasone propionate (FLONASE) 50 mcg/actuation nasal spray 2 sprays, Each Nostril, As needed (PRN)    folic acid (FOLVITE) 1 mg, Oral, Daily    furosemide (LASIX) 20 mg, Oral, Daily PRN    loratadine (CLARITIN) 10 mg, Oral, Daily    potassium chloride (KLOR-CON) 10 MEQ TbSR 10 mEq, Oral, Daily  PRN    pramipexole (MIRAPEX) 0.5 mg, Oral, Nightly    ranolazine (RANEXA) 500 mg, Oral, 2 times daily    ranolazine (RANEXA) 500 mg, Oral, 2 times daily    rosuvastatin (CRESTOR) 10 mg, Oral, Nightly    sertraline (ZOLOFT) 200 mg, Oral, Daily    traZODone (DESYREL) 50 MG tablet May take 25mg to 50mg at bedtime as needed for insomnia    VENTOLIN HFA 90 mcg/actuation inhaler 2 puffs, Inhalation, Every 6 hours PRN    vitamin D (VITAMIN D3) 1,000 Units, Oral, Daily        All medications, laboratory studies, cardiac diagnostic imaging reviewed.     Lab Results   Component Value Date    LDL 95.00 03/24/2023    LDL 84.00 07/13/2022    TRIG 130 03/24/2023    TRIG 166 (H) 07/13/2022    CREATININE 0.91 03/24/2023    K 4.2 03/24/2023        ASSESSMENT/PLAN:  CANO - stable   - EF - 55% per ECHO 12.6.22  - EF 55% per Echo 11.2021  - EF 55% per Echo 7.3.19  - Lexiscan Stress Test 5.13.22 showed  a mild intensity, small sized, fixed perfusion abnormality consistent with scar in the apical wall(s) in the typical distribution of the LAD territory  - Lexiscan stress test - negative for ischemia with no wall motion abnormalities (9.19.19)  - PFTs - normal  - Counseled patient on weight loss and increased activity as tolerated    HLD  - LDL at goal   - Continue Crestor 10mg daily   - Counseled on low-cholesterol, low-fat diet, and exercise as tolerated    Peripheral edema  - RLE venous study on 10.3.22 showed no DVT and no substantial reflux study  - LLE venous study on 9.29.22 showed possible non-occlusive deep vein thrombus but no substantial reflux  - LINDY testing Nov. 2021 - No evidence of significant arterial insufficiency was identified on the study  - Continue Lasix to 20mg PO Qd prn and compression pumps   - Follow up with [facility], Dr. Hannah mcneil     Morbid obesity  - Counseled patient on weight loss, heart healthy diet, and increased activity as tolerated     KI  - Compliant with home CPAP nightly       Home /72    Follow up  in Cardiology Clinic in 6 months or sooner if needed   Follow up with PCP as directed         No follow-ups on file. In addition to their scheduled follow up, the patient has also been instructed to follow up on as needed basis.     Future Appointments   Date Time Provider Department Center   9/26/2023 12:15 PM [facility]        Video Time Documentation:  Spent 10 minutes with patient face to face discussed health concerns. More than 50% of this time was spent in counseling and coordination of care.    FRANCIS Salcedo

## 2023-09-08 NOTE — PATIENT INSTRUCTIONS
Follow up in Cardiology Clinic in 6 months or sooner if needed   Follow up with PCP as directed

## 2023-09-19 ENCOUNTER — OFFICE VISIT (OUTPATIENT)
Dept: BEHAVIORAL HEALTH | Facility: CLINIC | Age: 68
End: 2023-09-19
Payer: MEDICARE

## 2023-09-19 VITALS
HEART RATE: 98 BPM | BODY MASS INDEX: 53.92 KG/M2 | WEIGHT: 293 LBS | SYSTOLIC BLOOD PRESSURE: 121 MMHG | TEMPERATURE: 98 F | DIASTOLIC BLOOD PRESSURE: 73 MMHG | HEIGHT: 62 IN

## 2023-09-19 DIAGNOSIS — G47.9 DIFFICULTY SLEEPING: Chronic | ICD-10-CM

## 2023-09-19 DIAGNOSIS — F32.A MILD DEPRESSION: Chronic | ICD-10-CM

## 2023-09-19 DIAGNOSIS — F41.9 ANXIETY: Chronic | ICD-10-CM

## 2023-09-19 PROCEDURE — 99214 OFFICE O/P EST MOD 30 MIN: CPT | Mod: PBBFAC,PN | Performed by: NURSE PRACTITIONER

## 2023-09-19 PROCEDURE — 99213 OFFICE O/P EST LOW 20 MIN: CPT | Mod: S$PBB,,, | Performed by: NURSE PRACTITIONER

## 2023-09-19 PROCEDURE — 99213 PR OFFICE/OUTPT VISIT, EST, LEVL III, 20-29 MIN: ICD-10-PCS | Mod: S$PBB,,, | Performed by: NURSE PRACTITIONER

## 2023-09-19 RX ORDER — SERTRALINE HYDROCHLORIDE 100 MG/1
100 TABLET, FILM COATED ORAL DAILY
Qty: 90 TABLET | Refills: 1 | Status: SHIPPED | OUTPATIENT
Start: 2023-09-19 | End: 2023-09-19 | Stop reason: DRUGHIGH

## 2023-09-19 RX ORDER — TRAZODONE HYDROCHLORIDE 50 MG/1
25 TABLET ORAL NIGHTLY
Qty: 45 TABLET | Refills: 1 | Status: SHIPPED | OUTPATIENT
Start: 2023-09-19

## 2023-09-19 RX ORDER — BUSPIRONE HYDROCHLORIDE 5 MG/1
5 TABLET ORAL 3 TIMES DAILY
Qty: 90 TABLET | Refills: 3 | Status: SHIPPED | OUTPATIENT
Start: 2023-09-19

## 2023-09-19 RX ORDER — SERTRALINE HYDROCHLORIDE 100 MG/1
200 TABLET, FILM COATED ORAL NIGHTLY
Qty: 180 TABLET | Refills: 1 | Status: SHIPPED | OUTPATIENT
Start: 2023-09-19

## 2023-09-19 RX ORDER — BUPROPION HYDROCHLORIDE 300 MG/1
300 TABLET ORAL DAILY
Qty: 90 TABLET | Refills: 1 | Status: SHIPPED | OUTPATIENT
Start: 2023-09-19

## 2023-09-19 NOTE — PROGRESS NOTES
Follow-up #3  09/19/2023/2023  HPI: 69yo WFreferred by PCP to the [facility] for [facility] and [facility]  PMHx: HLD, GERD, Osteoporosis, Anxiety.    Patient did not make her 6 week follow-up after her last visit in March. At that time, the Wellbutrin XL was increased to 300mg a day.    Today she states that she is taking the Zoloft 200mg and the Wellbutrin XL 300mg and that   She states that she is still tired; does not have any energy.   She is still having back pain. She has taken two shots and she has to take one more. She stated that there has been some pain relief when she takes the shots.   She states that she is unable to stand for 5 minutes without getting tired and having increased back pain.     She states that with the combination of the Trazodone and her CPAP, she is sleeping better. She states that she is resting better but does not have any more energy. Once she gets up, she naps in her chair.     Will continue Zoloft and Wellbutrin XL.  Will add Buspar 5mg TID.  FU in 6 weeks  - virtual    Patient then states that Dr. Conway had given her something to lose weight and that she is taking it: Contrave. She had some left over from her original prescription. She thinks that she has lost about 10lbs.   She states that she wanted to see if it would help her to lose weight.   She states that she stopped taking it after it was prescribed by Dr. Conway because she did not think that it was working.   She has not talked to anyone about weight loss surgery.   She will FU with her PCP later this month.       PHQ score:  09/19/2023: 10 moderate  03/17/2023: 7 mild  01/30/2023: 8? - mild to moderate  07/27/2022: 16 - moderately severe    NELY-7:  09/19/2023: 13 moderate  03/17/2023: 13 moderate  01/30/2023: 6  mild  07/27/2022: 14 - moderate anxiety    Mental Status Evaluation:  Appearance:  Not assessed; telephone visit   Behavior:  normal, cooperative   Speech:  no latency; no press   Mood:  euthymic   Affect:   Not assessed; telephone visit   Thought Process:  normal and logical   Thought Content:  normal, no suicidality, no homicidality, delusions, or paranoia   Sensorium:  grossly intact   Cognition:  grossly intact   Insight:  intact   Judgment:  behavior is adequate to circumstances        Impression:  1. Depression - mild  2. Anxiety - moderate  3. Difficulty sleeping    Plan:  1. Continue Zoloft 200mg q HS  2. Continue Wellbutrin XL to 300mg q day  3. Continue Trazodone 25-50mg as needed for insomnia  4. Add Buspar 5mg three times a day  5. FU in 6 weeks - virtual      Follow-up #2 03/17/2023  HPI: 66yo WFreferred by PCP to the [facility]  PMHx: HLD, GERD, Osteoporosis, Anxiety.    On her last visit, patient had been using a CPAP for about 3 months and was having difficulty getting used to it. She was sleeping about 6 hours but was still not sleeping well. Stated that she feels that she might feel better if she could sleep better. Trazodone 50mg was added for insomnia.   She was taking Zoloft 200mg q HS and Wellbutrin XL 150mg q day.   Stated that she was still anxious because she gets short of breath while trying to walk; feared that she would fall again.     Today, patient states that she is doing alright.  She states that she is taking Trazodone 25 and is sleeping better.   She is still taking the Zoloft 200mg at HS and Wellbutrin XL 150mg.     She states that she is still tired; has no energy. She would like to try an increase in the Wellbutrin XL. She finds that the Wellbutrin helps to decrease her anxiety. Discussed with patient the an increase in the Wellbutrin may not give her more energy but she would like to try.  Will increase to 300mg q day.     FU in 6 weeks. Patient requests audio call as she has great difficulty getting around.     PHQ score:  03/17/2023: 7 mild  01/30/2023: 8? - mild to moderate  07/27/2022: 16 - moderately severe    NELY-7:  03/17/2023: 13  moderate  01/30/2023: 6  mild  07/27/2022: 14 - moderate anxiety    Mental Status Evaluation:  Appearance:  Not assessed; telephone visit   Behavior:  normal, cooperative   Speech:  no latency; no press   Mood:  euthymic   Affect:  Not assessed; telephone visit   Thought Process:  normal and logical   Thought Content:  normal, no suicidality, no homicidality, delusions, or paranoia   Sensorium:  grossly intact   Cognition:  grossly intact   Insight:  intact   Judgment:  behavior is adequate to circumstances        Impression:  1. Depression - mild  2. Anxiety - moderate  3. Difficulty sleeping    Plan:  1. Continue Zoloft 200mg q HS  2. Increase Wellbutrin XL to 300mg q day  3. Continue Trazodone 25-50mg as needed for insomnia  4. FU in 6 weeks    Follow-up #1  01/30/2023  HPI: 66yo WFreferred by PCP to the [facility] for [facility] and anxiety  PMHx: HLD, GERD, Osteoporosis, Anxiety.    On her initial visit, she was to continue the Zoloft 200mg q HS and Wellbutrin XL 150mg q day was added.  She did not make her FU appointment.    Today, She states that she feels that the Wellbutrin has been helpful.  She had a sleep study and now has a CPAP.   She states that she is having trouble getting used to the CPAP. She has had it for about 3 months.   When she turns, the CPAP will make a noise and she wakes up and cannot go back to sleep.   She takes two 10mg Melatonin at night.  Believes that she is getting about 6 hours of sleep a night: 3 hours in her bed with the CPA and then 3 hours in her recliner without the CPAP.     She feels tired during the day.   States that if she could get sleep, she would feel better.     She is taking the Zoloft 200mg q HS and the Wellbutrin XL 150mg q day.   States that she is still anxious because she gets short of breath while trying to walk. She fears that she will fall again.     Will add Trazodone 50mg q HS.     PHQ score:  01/30/2023: 8? - mild to moderate  07/27/2022: 16 -  moderately severe    NELY-7:  01/30/2023: 6  mild  07/27/2022: 14 - moderate anxiety    Mental Status Evaluation:  Appearance:  casually dressed, neatly groomed, obese, seated in wheelchair   Behavior:  cooperative; restless   Speech:  no latency; no press   Mood:  anxious   Affect:  guarded, mood-congruent   Thought Process:  normal and logical   Thought Content:  normal, no suicidality, no homicidality, delusions, or paranoia   Sensorium:  grossly intact   Cognition:  grossly intact   Insight:  intact   Judgment:  behavior is adequate to circumstances     Impression:  1. Depression - mod severe  2. Anxiety - moderate  3. Difficulty sleeping  4. May need PT    Plan:  1. Continue Zoloft 200mg q HS  2. Continue Wellbutrin XL 150mg q day  3. Start Trazodone 25-50mg as needed for insomnia  4. FU in 6 weeks      Initial Interview 07/27/2022  HPI: 66yo WFreferred by PCP to the [facility] and [facility]  PMHx: HLD, GERD, Osteoporosis, Anxiety.    Patient presents with her .   Patient explains that although she is taking Zoloft 200mg q HS, she is still anxious and has low energy. She gets discouraged because she cannot do the things that she used to do.   States that she used to be able to do housework but now she does not have the energy. She stopped doing housework 3 months ago.   States that she has been depressed all of her life; since around 8 or 9.   Her mother and father . Her father was an alcoholic. They would argue and she would get nervous. She is still nervous. Worries about everything and every body.     States that she stopped doing things 3 months ago because she has a hard time standing up. Her back gets weak.   She fell and broke her arm about a month ago and had surgery two weeks ago.   One month before breaking her arm, she broke her foot while climbing into the truck. She could not get her leg up high enough to step onto the step to get into the truck. The step  rima.    In 2012, she fell and had to have shoulder replacement.     States that she has been taking Zoloft for about 10 years and it has worked well for her in the past.     Has not had a sleep study.   States that she does snore at night. Naps off and on during the day.   Does not feel that she could keep a mask on.   Requested PCP consider a sleep study.     Patient admits that she sits in the house a lot because she fears that she is going to fall. Both times that she fell, she reports that she had difficulty lifting her R leg. She fears that she is going to fall again.   Explained that the more she sits, the weaker she will become.  Strongly encouraged that she requests to go to PT to gain strength and confidence.     Will continue the Zoloft 200mg q HS.  Add Wellbutrin XL 150mg q day   FU in 3 weeks      Psychiatric History:   Reports a history of: depression and anxiety  History of mental health out-patient treatment:  History of in-patient psychiatric hospitalization: denies  History of suicidal ideations: denies  History of suicidal threats:  History of suicide attempts: denies  History of self mutilation: denies    History of psychotropic medications:   Strattera 40mg q day  Zoloft   Wellbutrin    Family Psychiatric History:  Mental Illness: denies  Alcohol abuse/addiction: father  Drug addiction:    Substance Use History:  Hx of addiction or abuse: denies  Alcohol: denies  Amphetamines: denies  Benzodiazepines: denies  Cocaine: denies  Opiates: recent Rx for Noco after breaking her arm  Marijuana: denies  Tobacco: denies  Caffeine:    Social History:  Grew up in: [place]  Raised by: mother  Number of siblings: one sister and two brothers  Education: 9th grade  Sexual identity: heterosexual  Marital status:  x 51 years  Number of children: 4 adult sons  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    PHQ score:  07/27/2022: 16 - moderately severe  Over the last two weeks how often have you been bothered by little interest or pleasure in doing things Nearly every day   Over the last two weeks how often have you been bothered by feeling down, depressed or hopeless Nearly every day   Over the last two weeks how often have you been bothered by trouble falling or staying asleep, or sleeping too much Nearly every day     Over the last two weeks how often have you been bothered by feeling tired or having little energy Nearly every day   Over the last two weeks how often have you been bothered by a poor appetite or overeating Not at all   Over the last two weeks how often have you been bothered by feeling bad about yourself - or that you are a failure or have let yourself or your family down Several days   Over the last two weeks how often have you been bothered by trouble concentrating on things, such as reading the newspaper or watching television Not at all   Over the last two weeks how often have you been bothered by moving or speaking so slowly that other people could have noticed. Or the opposite - being so fidgety or restless that you have been moving around a lot more than usual. Nearly every day   Over the last two weeks how often have you been bothered by thoughts that you would be better off dead, or of hurting yourself Not at all   If you checked off any problems, how difficult have these problems made it for you to do your work, take care of things at home or get along with other people? Very difficult   PHQ-9 Score 16   PHQ-9 Interpretation Moderately Severe     NELY-7:  07/27/2022: 14 - moderate anxiety  1. Feeling nervous,  anxious, or on edge? Nearly everyday   2. Not being able to stop or control worrying? Nearly everyday   3. Worrying too much about different things? Nearly everyday   4. Trouble relaxing? Nearly everyday   5. Being so restless that it is hard to sit still? Not at all   6. Becoming easily annoyed or irritable? Several days   7. Feeling afraid as if something awful might happen? Several days   8. If you checked off any problems, how difficult have these problems made it for you to do your work, take care of things at home, or get along with other people? Very difficult   NELY-7 Score 14   Number answered (out of first 7) 7   Interpretation Moderate Anxiety     Mental Status Evaluation:  Appearance:  casually dressed, neatly groomed, obese, seated in wheelchair   Behavior:  cooperative; restless   Speech:  no latency; no press   Mood:  anxious   Affect:  guarded, mood-congruent   Thought Process:  normal and logical   Thought Content:  normal, no suicidality, no homicidality, delusions, or paranoia   Sensorium:  grossly intact   Cognition:  grossly intact   Insight:  intact   Judgment:  behavior is adequate to circumstances     Impression:  1. Depression - mod severe  2. Anxiety - moderate  3. Difficulty sleeping  4. May need PT    Plan:  1. Zoloft 200mg q HS  2. Wellbutrin XL 150mg q day  3. Sleep study???  4. FU in 3 weeks

## 2023-09-19 NOTE — PATIENT INSTRUCTIONS
Continue Zoloft 200mg q HS  2. Continue Wellbutrin XL to 300mg q day  3. Continue Trazodone 25-50mg as needed for insomnia  4. Add Buspar 5mg three times a day  5. FU in 6 weeks - virtual

## 2023-10-17 ENCOUNTER — OFFICE VISIT (OUTPATIENT)
Dept: PAIN MEDICINE | Facility: CLINIC | Age: 68
End: 2023-10-17
Payer: MEDICARE

## 2023-10-17 VITALS
DIASTOLIC BLOOD PRESSURE: 63 MMHG | SYSTOLIC BLOOD PRESSURE: 114 MMHG | HEART RATE: 66 BPM | BODY MASS INDEX: 53.92 KG/M2 | HEIGHT: 62 IN | WEIGHT: 293 LBS

## 2023-10-17 DIAGNOSIS — M54.42 CHRONIC BILATERAL LOW BACK PAIN WITH BILATERAL SCIATICA: Primary | ICD-10-CM

## 2023-10-17 DIAGNOSIS — M54.41 CHRONIC BILATERAL LOW BACK PAIN WITH BILATERAL SCIATICA: Primary | ICD-10-CM

## 2023-10-17 DIAGNOSIS — M51.16 INTERVERTEBRAL DISC DISORDERS WITH RADICULOPATHY, LUMBAR REGION: ICD-10-CM

## 2023-10-17 DIAGNOSIS — G89.29 CHRONIC BILATERAL LOW BACK PAIN WITH BILATERAL SCIATICA: Primary | ICD-10-CM

## 2023-10-17 PROCEDURE — 99214 OFFICE O/P EST MOD 30 MIN: CPT | Mod: 25,ICN,, | Performed by: NURSE PRACTITIONER

## 2023-10-17 PROCEDURE — 96372 PR INJECTION,THERAP/PROPH/DIAG2ST, IM OR SUBCUT: ICD-10-PCS | Mod: ICN,,, | Performed by: NURSE PRACTITIONER

## 2023-10-17 PROCEDURE — 99214 PR OFFICE/OUTPT VISIT, EST, LEVL IV, 30-39 MIN: ICD-10-PCS | Mod: 25,ICN,, | Performed by: NURSE PRACTITIONER

## 2023-10-17 PROCEDURE — 96372 THER/PROPH/DIAG INJ SC/IM: CPT | Mod: ICN,,, | Performed by: NURSE PRACTITIONER

## 2023-10-17 RX ORDER — BETAMETHASONE SODIUM PHOSPHATE AND BETAMETHASONE ACETATE 3; 3 MG/ML; MG/ML
6 INJECTION, SUSPENSION INTRA-ARTICULAR; INTRALESIONAL; INTRAMUSCULAR; SOFT TISSUE ONCE
Status: COMPLETED | OUTPATIENT
Start: 2023-10-17 | End: 2023-10-17

## 2023-10-17 RX ADMIN — BETAMETHASONE SODIUM PHOSPHATE AND BETAMETHASONE ACETATE 6 MG: 3; 3 INJECTION, SUSPENSION INTRA-ARTICULAR; INTRALESIONAL; INTRAMUSCULAR; SOFT TISSUE at 04:10

## 2023-10-17 NOTE — H&P (VIEW-ONLY)
"Subjective:      Patient ID: Eleanor Buckner is a 68 y.o. female.    Chief Complaint: Low-back Pain (Pt having lower back pain, states Rt leg and hip hurts as well, C/O pain level 10, pt in wheelchair, Taking tylenol for pain, pain started 2 weeks ago states feels like its getting worse. No prior injury or sx.)    Referred by: No ref. provider found     HPI:  This is a pleasant 68-year-old  female patient presenting as a follow-up for three-month follow-up to evaluate bilateral sciatica.Her pain remains located to same area and has since her last transforaminal epidural steroid injection the bilateral L4 has worn off. Current back pain located to bilateral lower back, radiates to bilateral buttocks with R > L , then  radiates down right leg down to lateral thigh and stops just above right  knee. Pain returning. Attributes right leg pain interfering with her inability to walk.  No pain on left leg. No numbness to either foot.  No diabetes history reports lumbar. She has osteopenia and takes Calcium, Vitamin D and Fosamax for prior prevention of fractures.     Her pain has returned to same area to both legs. She would like to repeat her TFESI to Bilateral L4. No ASA , NSAIDS, or fish oil        Vital signs:   Vitals:    10/17/23 1517 10/17/23 1520   BP: 114/63    Pulse: 66    Weight: (!) 165.1 kg (364 lb)    Height: 5' 2" (1.575 m)    PainSc:  10-Worst pain ever     Body mass index is 66.58 kg/m².  Pain Disability Index (PDI): 66       Interventional Pain History  05/17/2023: Transforaminal epidural steroid injection bilateral L4  04/05/2023: Right L3 and right L4 transforaminal epidural steroid injections    ROS: :  Bilateral low back and leg pain right greater than left.     Pertinent Labs 2022:  Normal GFR + Crea + platelets and neutrophils     ROS:  Low back pain     MRI lumbar spine 01/09/2023:   FINDINGS:  For the purpose of this report, the most inferior well developed intervertebral disc space is " presumed to represent L5-S1. Lumbar vertebrae stature is unremarkable alignment is preserved.  There is a meningioma which involves T11 vertebral body.  There are no acute marrow edematous signals.  The visualized thoracic cord is unremarkable. The conus medullaris terminates at L1-L2.  Disc segmental analysis is given below:     At L1-L2, disc is unremarkable.  There is moderate central canal stenosis which is caused by facet arthropathy.  There are no narrowings of the neural foramen.     At L2-L3, disc is unremarkable.  Central canal is not stenosed and there are no narrowings of the neural foramen     At L3-L4, there is broad disc bulge which mildly compresses the ventral thecal sac.  Bilateral facet arthropathy and ligamentum flavum thickening.  These findings combine to cause moderate central canal stenosis.  There are no narrowings of the neural foramen.     At L4-L5, there is disc bulge which slightly flattens the ventral thecal sac.  There is ligamentum flavum thickening and facet arthropathy.  These findings result in moderate central canal stenosis.  There are no narrowings of the neural foramen.     At L5-S1, there is bulging of annulus fibrosis which slightly flattens the ventral thecal sac bilateral facet arthropathy.  There is no significant central canal stenosis.  There also no significant narrowings of the neural foramen     Impression:     Lumbar degenerative disc disease and spondylosis level by level discussed above.           Objective:      Physical Exam  General: Morbidlly obese  A&O x 3; No anxiety/depression; NAD  Mental Status: Oriented to person, place and time. Displays appropriate mood & affect.  Head: Norm cephalic and atraumatic  Neck: full ROM without deficits bilateral movements Land R, flexion and extension. No cervical paraspinal banding.   Eyes: normal conjunctiva, normal lids, normal pupils  ENT and mouth: normal external ear, nose, and no lesions noted on the  lips.  Respiratory: Symmetrical, Unlabored  Cardiovascular Normal rate and Rhythm. + swelling both ankles  Abdomen: Pendulous     Extremities:  Gen: No cyanosis or tenderness to palpation bilateral LE  Skin: Warm, pink, dry, no rashes, no lesions on the lumbar spine  Strength: 4/5 motor strength bilateral LE  ROM: Full ROM in right knee. Left knee weakness. Bilateral ankles without pain or instability.     Neuro:  Gait: antalgic gait improved since last visit as she was not able to stand on her right leg. Presented in clinic with wheel chair. Unable to ambulate without rollator.. .  Normal toe and heel raise  DTR's: 2+ in bilateral patellar,  Sensory: Intact to light touch bilateral  upper and lower extremities with numbness to right knee only.          Assessment:     A/P:  Her pain has returned to same area to both legs.   She would like to repeat her TFESI to Bilateral L4. Request submitted.     No ASA , NSAIDS, or fish oil   Steroid injection ordered IM in clinic today. See nurse/MA note for details  Follow up for pre op if indicated.          Encounter Diagnosis   Name Primary?    Chronic bilateral low back pain with bilateral sciatica Yes         Plan:       Eleanor was seen today for low-back pain.    Diagnoses and all orders for this visit:    Chronic bilateral low back pain with bilateral sciatica  -     betamethasone acetate-betamethasone sodium phosphate injection 6 mg               Past Medical History:   Diagnosis Date    Anxiety disorder, unspecified     Asthma     Depression     Digestive disorder     reflux    Fibromyalgia     Hiatal hernia     Hyperlipemia     Neuropathy     feet    PVD (peripheral vascular disease)     Restless leg syndrome     Short of breath on exertion     Sleep apnea, unspecified     cpap       Past Surgical History:   Procedure Laterality Date    APPENDECTOMY      CHOLECYSTECTOMY      COLONOSCOPY  01/13/2020    ESOPHAGOGASTRODUODENOSCOPY  06/10/2016    INJECTION OF ANESTHETIC  AGENT AROUND MEDIAL BRANCH NERVES INNERVATING LUMBAR FACET JOINT Right 02/22/2023    Procedure: Block-nerve-medial branch-lumbar;  Surgeon: Cassi Muñoz MD;  Location: [facility] OR;  Service: Pain Management;  Laterality: Right;  L4/L5 and L5/S1    JOINT REPLACEMENT Right 03-    total shoulder    OPEN REDUCTION AND INTERNAL FIXATION (ORIF) OF FRACTURE OF DISTAL RADIUS Right 07/14/2022    Procedure: ORIF, FRACTURE, RADIUS, DISTAL;  Surgeon: Emery Morel MD;  Location: [facility] OR;  Service: Orthopedics;  Laterality: Right;    PROCTOSCOPY  03/23/2016    TRANSFORAMINAL EPIDURAL INJECTION OF STEROID Right 4/5/2023    Procedure: Injection,steroid,epidural,transforaminal approach;  Surgeon: Cassi Muñoz MD;  Location: [facility] OR;  Service: Pain Management;  Laterality: Right;  TFESI on Right at L3/L4    TRANSFORAMINAL EPIDURAL INJECTION OF STEROID Bilateral 5/17/2023    Procedure: Injection,steroid,epidural,transforaminal approach;  Surgeon: Cassi Muñoz MD;  Location: [facility] OR;  Service: Pain Management;  Laterality: Bilateral;  Transforaminal Epidural Steroid Injection  Bilateral L4       Family History   Problem Relation Age of Onset    Heart failure Mother     Heart disease Mother     Osteoporosis Sister     Osteoporosis Brother     Gout Brother     Arthritis Father        Social History     Socioeconomic History    Marital status:    Tobacco Use    Smoking status: Never    Smokeless tobacco: Never    Tobacco comments:     None   Substance and Sexual Activity    Alcohol use: Never    Drug use: Never    Sexual activity: Yes     Partners: Male     Birth control/protection: Partner-Vasectomy       Current Outpatient Medications   Medication Sig Dispense Refill    acetaminophen (TYLENOL) 500 MG tablet Take 1,000 mg by mouth nightly.      albuterol (PROVENTIL/VENTOLIN HFA) 90 mcg/actuation inhaler Inhale into the lungs every 4 (four) hours as needed.      alendronate (FOSAMAX) 70 MG tablet Take 1 tablet  (70 mg total) by mouth once a week. (Patient taking differently: Take 70 mg by mouth every 7 days.) 12 tablet 2    amLODIPine (NORVASC) 2.5 MG tablet Take 2.5 mg by mouth 2 (two) times daily.      baclofen (LIORESAL) 10 MG tablet Take 1 tablet (10 mg total) by mouth 3 (three) times daily. (Patient taking differently: Take 10 mg by mouth nightly.) 270 tablet 2    budesonide (RINOCORT AQUA) 32 mcg/actuation nasal spray 1 spray by Nasal route as needed.      buPROPion (WELLBUTRIN XL) 300 MG 24 hr tablet Take 1 tablet (300 mg total) by mouth once daily. 90 tablet 1    busPIRone (BUSPAR) 5 MG Tab Take 1 tablet (5 mg total) by mouth 3 (three) times daily. 90 tablet 3    dexbrompheniramine maleate (ALA-HIST IR) 2 mg Tab Take 1 tablet by mouth as needed.      docusate sodium 100 mg capsule Take 100 mg by mouth daily as needed.      ergocalciferol (ERGOCALCIFEROL) 50,000 unit Cap Take 1 capsule (50,000 Units total) by mouth every 7 days. 12 capsule 2    esomeprazole (NEXIUM) 40 MG capsule Take 1 capsule (40 mg total) by mouth before breakfast. 90 capsule 2    fluticasone propionate (FLONASE) 50 mcg/actuation nasal spray 2 sprays by Each Nostril route as needed.      folic acid (FOLVITE) 1 MG tablet Take 1 mg by mouth once daily.      furosemide (LASIX) 20 MG tablet Take 1 tablet (20 mg total) by mouth daily as needed (swelling). 30 tablet 6    loratadine (CLARITIN) 10 mg tablet Take 1 tablet (10 mg total) by mouth once daily. 90 tablet 2    potassium chloride (KLOR-CON) 10 MEQ TbSR Take 10 mEq by mouth daily as needed.      pramipexole (MIRAPEX) 0.5 MG tablet Take 1 tablet (0.5 mg total) by mouth every evening. 90 tablet 2    ranolazine (RANEXA) 500 MG Tb12 Take 500 mg by mouth 2 (two) times daily.      ranolazine (RANEXA) 500 MG Tb12 Take 500 mg by mouth 2 (two) times daily.      rosuvastatin (CRESTOR) 10 MG tablet Take 1 tablet (10 mg total) by mouth every evening. 90 tablet 2    sertraline (ZOLOFT) 100 MG tablet Take 2  tablets (200 mg total) by mouth every evening. 180 tablet 1    traZODone (DESYREL) 50 MG tablet Take 0.5 tablets (25 mg total) by mouth every evening. 45 tablet 1    VENTOLIN HFA 90 mcg/actuation inhaler Inhale 2 puffs into the lungs every 6 (six) hours as needed for Shortness of Breath. 18 g 2    vitamin D (VITAMIN D3) 1000 units Tab Take 1,000 Units by mouth once daily.       Current Facility-Administered Medications   Medication Dose Route Frequency Provider Last Rate Last Admin    betamethasone acetate-betamethasone sodium phosphate injection 6 mg  6 mg Intramuscular Once Adamaris Oliver, NP           Review of patient's allergies indicates:   Allergen Reactions    Nsaids (non-steroidal anti-inflammatory drug) Nausea And Vomiting

## 2023-10-17 NOTE — PROGRESS NOTES
"Subjective:      Patient ID: Eleanor Buckner is a 68 y.o. female.    Chief Complaint: Low-back Pain (Pt having lower back pain, states Rt leg and hip hurts as well, C/O pain level 10, pt in wheelchair, Taking tylenol for pain, pain started 2 weeks ago states feels like its getting worse. No prior injury or sx.)    Referred by: No ref. provider found     HPI:  This is a pleasant 68-year-old  female patient presenting as a follow-up for three-month follow-up to evaluate bilateral sciatica.Her pain remains located to same area and has since her last transforaminal epidural steroid injection the bilateral L4 has worn off. Current back pain located to bilateral lower back, radiates to bilateral buttocks with R > L , then  radiates down right leg down to lateral thigh and stops just above right  knee. Pain returning. Attributes right leg pain interfering with her inability to walk.  No pain on left leg. No numbness to either foot.  No diabetes history reports lumbar. She has osteopenia and takes Calcium, Vitamin D and Fosamax for prior prevention of fractures.     Her pain has returned to same area to both legs. She would like to repeat her TFESI to Bilateral L4. No ASA , NSAIDS, or fish oil        Vital signs:   Vitals:    10/17/23 1517 10/17/23 1520   BP: 114/63    Pulse: 66    Weight: (!) 165.1 kg (364 lb)    Height: 5' 2" (1.575 m)    PainSc:  10-Worst pain ever     Body mass index is 66.58 kg/m².  Pain Disability Index (PDI): 66       Interventional Pain History  05/17/2023: Transforaminal epidural steroid injection bilateral L4  04/05/2023: Right L3 and right L4 transforaminal epidural steroid injections    ROS: :  Bilateral low back and leg pain right greater than left.     Pertinent Labs 2022:  Normal GFR + Crea + platelets and neutrophils     ROS:  Low back pain     MRI lumbar spine 01/09/2023:   FINDINGS:  For the purpose of this report, the most inferior well developed intervertebral disc space is " presumed to represent L5-S1. Lumbar vertebrae stature is unremarkable alignment is preserved.  There is a meningioma which involves T11 vertebral body.  There are no acute marrow edematous signals.  The visualized thoracic cord is unremarkable. The conus medullaris terminates at L1-L2.  Disc segmental analysis is given below:     At L1-L2, disc is unremarkable.  There is moderate central canal stenosis which is caused by facet arthropathy.  There are no narrowings of the neural foramen.     At L2-L3, disc is unremarkable.  Central canal is not stenosed and there are no narrowings of the neural foramen     At L3-L4, there is broad disc bulge which mildly compresses the ventral thecal sac.  Bilateral facet arthropathy and ligamentum flavum thickening.  These findings combine to cause moderate central canal stenosis.  There are no narrowings of the neural foramen.     At L4-L5, there is disc bulge which slightly flattens the ventral thecal sac.  There is ligamentum flavum thickening and facet arthropathy.  These findings result in moderate central canal stenosis.  There are no narrowings of the neural foramen.     At L5-S1, there is bulging of annulus fibrosis which slightly flattens the ventral thecal sac bilateral facet arthropathy.  There is no significant central canal stenosis.  There also no significant narrowings of the neural foramen     Impression:     Lumbar degenerative disc disease and spondylosis level by level discussed above.           Objective:      Physical Exam  General: Morbidlly obese  A&O x 3; No anxiety/depression; NAD  Mental Status: Oriented to person, place and time. Displays appropriate mood & affect.  Head: Norm cephalic and atraumatic  Neck: full ROM without deficits bilateral movements Land R, flexion and extension. No cervical paraspinal banding.   Eyes: normal conjunctiva, normal lids, normal pupils  ENT and mouth: normal external ear, nose, and no lesions noted on the  lips.  Respiratory: Symmetrical, Unlabored  Cardiovascular Normal rate and Rhythm. + swelling both ankles  Abdomen: Pendulous     Extremities:  Gen: No cyanosis or tenderness to palpation bilateral LE  Skin: Warm, pink, dry, no rashes, no lesions on the lumbar spine  Strength: 4/5 motor strength bilateral LE  ROM: Full ROM in right knee. Left knee weakness. Bilateral ankles without pain or instability.     Neuro:  Gait: antalgic gait improved since last visit as she was not able to stand on her right leg. Presented in clinic with wheel chair. Unable to ambulate without rollator.. .  Normal toe and heel raise  DTR's: 2+ in bilateral patellar,  Sensory: Intact to light touch bilateral  upper and lower extremities with numbness to right knee only.          Assessment:     A/P:  Her pain has returned to same area to both legs.   She would like to repeat her TFESI to Bilateral L4. Request submitted.     No ASA , NSAIDS, or fish oil   Steroid injection ordered IM in clinic today. See nurse/MA note for details  Follow up for pre op if indicated.          Encounter Diagnosis   Name Primary?    Chronic bilateral low back pain with bilateral sciatica Yes         Plan:       Eleanor was seen today for low-back pain.    Diagnoses and all orders for this visit:    Chronic bilateral low back pain with bilateral sciatica  -     betamethasone acetate-betamethasone sodium phosphate injection 6 mg               Past Medical History:   Diagnosis Date    Anxiety disorder, unspecified     Asthma     Depression     Digestive disorder     reflux    Fibromyalgia     Hiatal hernia     Hyperlipemia     Neuropathy     feet    PVD (peripheral vascular disease)     Restless leg syndrome     Short of breath on exertion     Sleep apnea, unspecified     cpap       Past Surgical History:   Procedure Laterality Date    APPENDECTOMY      CHOLECYSTECTOMY      COLONOSCOPY  01/13/2020    ESOPHAGOGASTRODUODENOSCOPY  06/10/2016    INJECTION OF ANESTHETIC  AGENT AROUND MEDIAL BRANCH NERVES INNERVATING LUMBAR FACET JOINT Right 02/22/2023    Procedure: Block-nerve-medial branch-lumbar;  Surgeon: Cassi Muñoz MD;  Location: [facility] OR;  Service: Pain Management;  Laterality: Right;  L4/L5 and L5/S1    JOINT REPLACEMENT Right 03-    total shoulder    OPEN REDUCTION AND INTERNAL FIXATION (ORIF) OF FRACTURE OF DISTAL RADIUS Right 07/14/2022    Procedure: ORIF, FRACTURE, RADIUS, DISTAL;  Surgeon: Emery Morel MD;  Location: [facility] OR;  Service: Orthopedics;  Laterality: Right;    PROCTOSCOPY  03/23/2016    TRANSFORAMINAL EPIDURAL INJECTION OF STEROID Right 4/5/2023    Procedure: Injection,steroid,epidural,transforaminal approach;  Surgeon: Cassi Muñoz MD;  Location: [facility] OR;  Service: Pain Management;  Laterality: Right;  TFESI on Right at L3/L4    TRANSFORAMINAL EPIDURAL INJECTION OF STEROID Bilateral 5/17/2023    Procedure: Injection,steroid,epidural,transforaminal approach;  Surgeon: Cassi Muñoz MD;  Location: [facility] OR;  Service: Pain Management;  Laterality: Bilateral;  Transforaminal Epidural Steroid Injection  Bilateral L4       Family History   Problem Relation Age of Onset    Heart failure Mother     Heart disease Mother     Osteoporosis Sister     Osteoporosis Brother     Gout Brother     Arthritis Father        Social History     Socioeconomic History    Marital status:    Tobacco Use    Smoking status: Never    Smokeless tobacco: Never    Tobacco comments:     None   Substance and Sexual Activity    Alcohol use: Never    Drug use: Never    Sexual activity: Yes     Partners: Male     Birth control/protection: Partner-Vasectomy       Current Outpatient Medications   Medication Sig Dispense Refill    acetaminophen (TYLENOL) 500 MG tablet Take 1,000 mg by mouth nightly.      albuterol (PROVENTIL/VENTOLIN HFA) 90 mcg/actuation inhaler Inhale into the lungs every 4 (four) hours as needed.      alendronate (FOSAMAX) 70 MG tablet Take 1 tablet  (70 mg total) by mouth once a week. (Patient taking differently: Take 70 mg by mouth every 7 days.) 12 tablet 2    amLODIPine (NORVASC) 2.5 MG tablet Take 2.5 mg by mouth 2 (two) times daily.      baclofen (LIORESAL) 10 MG tablet Take 1 tablet (10 mg total) by mouth 3 (three) times daily. (Patient taking differently: Take 10 mg by mouth nightly.) 270 tablet 2    budesonide (RINOCORT AQUA) 32 mcg/actuation nasal spray 1 spray by Nasal route as needed.      buPROPion (WELLBUTRIN XL) 300 MG 24 hr tablet Take 1 tablet (300 mg total) by mouth once daily. 90 tablet 1    busPIRone (BUSPAR) 5 MG Tab Take 1 tablet (5 mg total) by mouth 3 (three) times daily. 90 tablet 3    dexbrompheniramine maleate (ALA-HIST IR) 2 mg Tab Take 1 tablet by mouth as needed.      docusate sodium 100 mg capsule Take 100 mg by mouth daily as needed.      ergocalciferol (ERGOCALCIFEROL) 50,000 unit Cap Take 1 capsule (50,000 Units total) by mouth every 7 days. 12 capsule 2    esomeprazole (NEXIUM) 40 MG capsule Take 1 capsule (40 mg total) by mouth before breakfast. 90 capsule 2    fluticasone propionate (FLONASE) 50 mcg/actuation nasal spray 2 sprays by Each Nostril route as needed.      folic acid (FOLVITE) 1 MG tablet Take 1 mg by mouth once daily.      furosemide (LASIX) 20 MG tablet Take 1 tablet (20 mg total) by mouth daily as needed (swelling). 30 tablet 6    loratadine (CLARITIN) 10 mg tablet Take 1 tablet (10 mg total) by mouth once daily. 90 tablet 2    potassium chloride (KLOR-CON) 10 MEQ TbSR Take 10 mEq by mouth daily as needed.      pramipexole (MIRAPEX) 0.5 MG tablet Take 1 tablet (0.5 mg total) by mouth every evening. 90 tablet 2    ranolazine (RANEXA) 500 MG Tb12 Take 500 mg by mouth 2 (two) times daily.      ranolazine (RANEXA) 500 MG Tb12 Take 500 mg by mouth 2 (two) times daily.      rosuvastatin (CRESTOR) 10 MG tablet Take 1 tablet (10 mg total) by mouth every evening. 90 tablet 2    sertraline (ZOLOFT) 100 MG tablet Take 2  tablets (200 mg total) by mouth every evening. 180 tablet 1    traZODone (DESYREL) 50 MG tablet Take 0.5 tablets (25 mg total) by mouth every evening. 45 tablet 1    VENTOLIN HFA 90 mcg/actuation inhaler Inhale 2 puffs into the lungs every 6 (six) hours as needed for Shortness of Breath. 18 g 2    vitamin D (VITAMIN D3) 1000 units Tab Take 1,000 Units by mouth once daily.       Current Facility-Administered Medications   Medication Dose Route Frequency Provider Last Rate Last Admin    betamethasone acetate-betamethasone sodium phosphate injection 6 mg  6 mg Intramuscular Once Adamaris Oliver, NP           Review of patient's allergies indicates:   Allergen Reactions    Nsaids (non-steroidal anti-inflammatory drug) Nausea And Vomiting

## 2023-10-27 ENCOUNTER — TELEPHONE (OUTPATIENT)
Dept: INTERNAL MEDICINE | Facility: CLINIC | Age: 68
End: 2023-10-27
Payer: MEDICARE

## 2023-10-27 RX ORDER — PRAMIPEXOLE DIHYDROCHLORIDE 0.5 MG/1
0.5 TABLET ORAL NIGHTLY
Qty: 90 TABLET | Refills: 0 | Status: SHIPPED | OUTPATIENT
Start: 2023-10-27 | End: 2024-01-23 | Stop reason: SDUPTHER

## 2023-10-27 NOTE — TELEPHONE ENCOUNTER
Contacted No labs done . Instructed that Patience need her to do blood work . Last appointment was in March.She voiced understanding.

## 2023-11-01 ENCOUNTER — OFFICE VISIT (OUTPATIENT)
Dept: BEHAVIORAL HEALTH | Facility: CLINIC | Age: 68
End: 2023-11-01
Payer: MEDICARE

## 2023-11-01 DIAGNOSIS — F41.9 ANXIETY: Chronic | ICD-10-CM

## 2023-11-01 DIAGNOSIS — F32.A MILD DEPRESSION: Primary | Chronic | ICD-10-CM

## 2023-11-01 PROCEDURE — 99212 OFFICE O/P EST SF 10 MIN: CPT | Mod: 95,,, | Performed by: NURSE PRACTITIONER

## 2023-11-01 PROCEDURE — 99212 PR OFFICE/OUTPT VISIT, EST, LEVL II, 10-19 MIN: ICD-10-PCS | Mod: 95,,, | Performed by: NURSE PRACTITIONER

## 2023-11-01 NOTE — PROGRESS NOTES
Follow-up #4  11/01/2023  HPI: 67yo WFreferred by PCP to the [facility] and [facility]  PMHx: HLD, GERD, Osteoporosis, Anxiety.      On her last visit, Buspar 5mg three times a day was added to the Zoloft and Wellbutrin.     Today, patient stats that she is doing alright.  She states that she does not notice any great difference. She is taking the Buspar twice a day. Encouraged to take three times a day if needed.   She states that she is not too anxious.     She states that she is sleeping good at night and sleeps some during the day. She is cutting back on the dose of Trazodone.    Follow-up in 3 months - virtual      PHQ score:  11/01/2023: virtual  09/19/2023: 10 moderate  03/17/2023: 7 mild  01/30/2023: 8? - mild to moderate  07/27/2022: 16 - moderately severe    NELY-7:  11/01/2023: virtual  09/19/2023: 13 moderate  03/17/2023: 13 moderate  01/30/2023: 6  mild  07/27/2022: 14 - moderate anxiety    Mental Status Evaluation:  Appearance:  appropriate   Behavior:  normal, cooperative   Speech:  no latency; no press   Mood:  euthymic   Affect:  mood congruent   Thought Process:  normal and logical   Thought Content:  normal, no suicidality, no homicidality, delusions, or paranoia   Sensorium:  grossly intact   Cognition:  grossly intact   Insight:  intact   Judgment:  behavior is adequate to circumstances        Impression:  1. Depression - mild  2. Anxiety - moderate  3. Difficulty sleeping    Plan:  1. Continue Zoloft 200mg q HS  2. Continue Wellbutrin XL to 300mg q day  3. Continue Trazodone 25-50mg as needed for insomnia  4. Continue Buspar 5mg three times a day  5. FU in 3 months - virtual      Follow-up #3  09/19/2023/2023  HPI: 67yo WFreferred by PCP to the [facility] for depression and anxiety  PMHx: HLD, GERD, Osteoporosis, Anxiety.    Patient did not make her 6 week follow-up after her last visit in March. At that time, the Wellbutrin XL was increased to 300mg a day.    Today she  states that she is taking the Zoloft 200mg and the Wellbutrin XL 300mg and that   She states that she is still tired; does not have any energy.   She is still having back pain. She has taken two shots and she has to take one more. She stated that there has been some pain relief when she takes the shots.   She states that she is unable to stand for 5 minutes without getting tired and having increased back pain.     She states that with the combination of the Trazodone and her CPAP, she is sleeping better. She states that she is resting better but does not have any more energy. Once she gets up, she naps in her chair.     Will continue Zoloft and Wellbutrin XL.  Will add Buspar 5mg TID.  FU in 6 weeks  - virtual    Patient then states that Dr. Conway had given her something to lose weight and that she is taking it: Contrave. She had some left over from her original prescription. She thinks that she has lost about 10lbs.   She states that she wanted to see if it would help her to lose weight.   She states that she stopped taking it after it was prescribed by Dr. Conway because she did not think that it was working.   She has not talked to anyone about weight loss surgery.   She will FU with her PCP later this month.       PHQ score:  09/19/2023: 10 moderate  03/17/2023: 7 mild  01/30/2023: 8? - mild to moderate  07/27/2022: 16 - moderately severe    NELY-7:  09/19/2023: 13 moderate  03/17/2023: 13 moderate  01/30/2023: 6  mild  07/27/2022: 14 - moderate anxiety    Mental Status Evaluation:  Appearance:  Not assessed; telephone visit   Behavior:  normal, cooperative   Speech:  no latency; no press   Mood:  euthymic   Affect:  Not assessed; telephone visit   Thought Process:  normal and logical   Thought Content:  normal, no suicidality, no homicidality, delusions, or paranoia   Sensorium:  grossly intact   Cognition:  grossly intact   Insight:  intact   Judgment:  behavior is adequate to circumstances         Impression:  1. Depression - mild  2. Anxiety - moderate  3. Difficulty sleeping    Plan:  1. Continue Zoloft 200mg q HS  2. Continue Wellbutrin XL to 300mg q day  3. Continue Trazodone 25-50mg as needed for insomnia  4. Add Buspar 5mg three times a day  5. FU in 6 weeks - virtual      Follow-up #2 03/17/2023  HPI: 68yo WFreferred by PCP to the [facility] for depression and anxiety  PMHx: HLD, GERD, Osteoporosis, Anxiety.    On her last visit, patient had been using a CPAP for about 3 months and was having difficulty getting used to it. She was sleeping about 6 hours but was still not sleeping well. Stated that she feels that she might feel better if she could sleep better. Trazodone 50mg was added for insomnia.   She was taking Zoloft 200mg q HS and Wellbutrin XL 150mg q day.   Stated that she was still anxious because she gets short of breath while trying to walk; feared that she would fall again.     Today, patient states that she is doing alright.  She states that she is taking Trazodone 25 and is sleeping better.   She is still taking the Zoloft 200mg at HS and Wellbutrin XL 150mg.     She states that she is still tired; has no energy. She would like to try an increase in the Wellbutrin XL. She finds that the Wellbutrin helps to decrease her anxiety. Discussed with patient the an increase in the Wellbutrin may not give her more energy but she would like to try.  Will increase to 300mg q day.     FU in 6 weeks. Patient requests audio call as she has great difficulty getting around.     PHQ score:  03/17/2023: 7 mild  01/30/2023: 8? - mild to moderate  07/27/2022: 16 - moderately severe    NELY-7:  03/17/2023: 13 moderate  01/30/2023: 6  mild  07/27/2022: 14 - moderate anxiety    Mental Status Evaluation:  Appearance:  Not assessed; telephone visit   Behavior:  normal, cooperative   Speech:  no latency; no press   Mood:  euthymic   Affect:  Not assessed; telephone visit   Thought Process:  normal and  logical   Thought Content:  normal, no suicidality, no homicidality, delusions, or paranoia   Sensorium:  grossly intact   Cognition:  grossly intact   Insight:  intact   Judgment:  behavior is adequate to circumstances        Impression:  1. Depression - mild  2. Anxiety - moderate  3. Difficulty sleeping    Plan:  1. Continue Zoloft 200mg q HS  2. Increase Wellbutrin XL to 300mg q day  3. Continue Trazodone 25-50mg as needed for insomnia  4. FU in 6 weeks    Follow-up #1  01/30/2023  HPI: 68yo WFreferred by PCP to the [facility]  PMHx: HLD, GERD, Osteoporosis, Anxiety.    On her initial visit, she was to continue the Zoloft 200mg q HS and Wellbutrin XL 150mg q day was added.  She did not make her FU appointment.    Today, She states that she feels that the Wellbutrin has been helpful.  She had a sleep study and now has a CPAP.   She states that she is having trouble getting used to the CPAP. She has had it for about 3 months.   When she turns, the CPAP will make a noise and she wakes up and cannot go back to sleep.   She takes two 10mg Melatonin at night.  Believes that she is getting about 6 hours of sleep a night: 3 hours in her bed with the CPA and then 3 hours in her recliner without the CPAP.     She feels tired during the day.   States that if she could get sleep, she would feel better.     She is taking the Zoloft 200mg q HS and the Wellbutrin XL 150mg q day.   States that she is still anxious because she gets short of breath while trying to walk. She fears that she will fall again.     Will add Trazodone 50mg q HS.     PHQ score:  01/30/2023: 8? - mild to moderate  07/27/2022: 16 - moderately severe    NELY-7:  01/30/2023: 6  mild  07/27/2022: 14 - moderate anxiety    Mental Status Evaluation:  Appearance:  casually dressed, neatly groomed, obese, seated in wheelchair   Behavior:  cooperative; restless   Speech:  no latency; no press   Mood:  anxious   Affect:  guarded,  mood-congruent   Thought Process:  normal and logical   Thought Content:  normal, no suicidality, no homicidality, delusions, or paranoia   Sensorium:  grossly intact   Cognition:  grossly intact   Insight:  intact   Judgment:  behavior is adequate to circumstances     Impression:  1. Depression - mod severe  2. Anxiety - moderate  3. Difficulty sleeping  4. May need PT    Plan:  1. Continue Zoloft 200mg q HS  2. Continue Wellbutrin XL 150mg q day  3. Start Trazodone 25-50mg as needed for insomnia  4. FU in 6 weeks      Initial Interview 07/27/2022  HPI: 68yo WFreferred by PCP to the [facility] and anxiety  PMHx: HLD, GERD, Osteoporosis, Anxiety.    Patient presents with her .   Patient explains that although she is taking Zoloft 200mg q HS, she is still anxious and has low energy. She gets discouraged because she cannot do the things that she used to do.   States that she used to be able to do housework but now she does not have the energy. She stopped doing housework 3 months ago.   States that she has been depressed all of her life; since around 8 or 9.   Her mother and father . Her father was an alcoholic. They would argue and she would get nervous. She is still nervous. Worries about everything and every body.     States that she stopped doing things 3 months ago because she has a hard time standing up. Her back gets weak.   She fell and broke her arm about a month ago and had surgery two weeks ago.   One month before breaking her arm, she broke her foot while climbing into the truck. She could not get her leg up high enough to step onto the step to get into the truck. The step broke.    In 2012, she fell and had to have shoulder replacement.     States that she has been taking Zoloft for about 10 years and it has worked well for her in the past.     Has not had a sleep study.   States that she does snore at night. Naps off and on during the day.   Does not feel that she  could keep a mask on.   Requested PCP consider a sleep study.     Patient admits that she sits in the house a lot because she fears that she is going to fall. Both times that she fell, she reports that she had difficulty lifting her R leg. She fears that she is going to fall again.   Explained that the more she sits, the weaker she will become.  Strongly encouraged that she requests to go to PT to gain strength and confidence.     Will continue the Zoloft 200mg q HS.  Add Wellbutrin XL 150mg q day   FU in 3 weeks      Psychiatric History:   Reports a history of: depression and anxiety  History of mental health out-patient treatment:  History of in-patient psychiatric hospitalization: denies  History of suicidal ideations: denies  History of suicidal threats:  History of suicide attempts: denies  History of self mutilation: denies    History of psychotropic medications:   Strattera 40mg q day  Zoloft   Wellbutrin    Family Psychiatric History:  Mental Illness: denies  Alcohol abuse/addiction: father  Drug addiction:    Substance Use History:  Hx of addiction or abuse: denies  Alcohol: denies  Amphetamines: denies  Benzodiazepines: denies  Cocaine: denies  Opiates: recent Rx for Noco after breaking her arm  Marijuana: denies  Tobacco: denies  Caffeine:    Social History:  Grew up in: [place]  Raised by: mother  Number of siblings: one sister and two brothers  Education: 9th grade  Sexual identity: heterosexual  Marital status:  x 51 years  Number of children: 4 adult sons  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    PHQ score:  07/27/2022: 16 -  moderately severe  Over the last two weeks how often have you been bothered by little interest or pleasure in doing things Nearly every day   Over the last two weeks how often have you been bothered by feeling down, depressed or hopeless Nearly every day   Over the last two weeks how often have you been bothered by trouble falling or staying asleep, or sleeping too much Nearly every day     Over the last two weeks how often have you been bothered by feeling tired or having little energy Nearly every day   Over the last two weeks how often have you been bothered by a poor appetite or overeating Not at all   Over the last two weeks how often have you been bothered by feeling bad about yourself - or that you are a failure or have let yourself or your family down Several days   Over the last two weeks how often have you been bothered by trouble concentrating on things, such as reading the newspaper or watching television Not at all   Over the last two weeks how often have you been bothered by moving or speaking so slowly that other people could have noticed. Or the opposite - being so fidgety or restless that you have been moving around a lot more than usual. Nearly every day   Over the last two weeks how often have you been bothered by thoughts that you would be better off dead, or of hurting yourself Not at all   If you checked off any problems, how difficult have these problems made it for you to do your work, take care of things at home or get along with other people? Very difficult   PHQ-9 Score 16   PHQ-9 Interpretation Moderately Severe     NELY-7:  07/27/2022: 14 - moderate anxiety  1. Feeling nervous, anxious, or on edge? Nearly everyday   2. Not being able to stop or control worrying? Nearly everyday   3. Worrying too much about different things? Nearly everyday   4. Trouble relaxing? Nearly everyday   5. Being so restless that it is hard to sit still? Not at all   6. Becoming easily annoyed or irritable?  Several days   7. Feeling afraid as if something awful might happen? Several days   8. If you checked off any problems, how difficult have these problems made it for you to do your work, take care of things at home, or get along with other people? Very difficult   NELY-7 Score 14   Number answered (out of first 7) 7   Interpretation Moderate Anxiety     Mental Status Evaluation:  Appearance:  casually dressed, neatly groomed, obese, seated in wheelchair   Behavior:  cooperative; restless   Speech:  no latency; no press   Mood:  anxious   Affect:  guarded, mood-congruent   Thought Process:  normal and logical   Thought Content:  normal, no suicidality, no homicidality, delusions, or paranoia   Sensorium:  grossly intact   Cognition:  grossly intact   Insight:  intact   Judgment:  behavior is adequate to circumstances     Impression:  1. Depression - mod severe  2. Anxiety - moderate  3. Difficulty sleeping  4. May need PT    Plan:  1. Zoloft 200mg q HS  2. Wellbutrin XL 150mg q day  3. Sleep study???  4. FU in 3 weeks

## 2023-11-09 ENCOUNTER — ANESTHESIA EVENT (OUTPATIENT)
Dept: SURGERY | Facility: HOSPITAL | Age: 68
End: 2023-11-09
Payer: MEDICARE

## 2023-11-14 ENCOUNTER — PATIENT MESSAGE (OUTPATIENT)
Dept: ADMINISTRATIVE | Facility: OTHER | Age: 68
End: 2023-11-14
Payer: MEDICARE

## 2023-11-15 ENCOUNTER — ANESTHESIA (OUTPATIENT)
Dept: SURGERY | Facility: HOSPITAL | Age: 68
End: 2023-11-15
Payer: MEDICARE

## 2023-11-15 ENCOUNTER — HOSPITAL ENCOUNTER (OUTPATIENT)
Facility: HOSPITAL | Age: 68
Discharge: HOME | End: 2023-11-15
Attending: ANESTHESIOLOGY | Admitting: ANESTHESIOLOGY
Payer: MEDICARE

## 2023-11-15 DIAGNOSIS — M54.9 CHRONIC BACK PAIN GREATER THAN 3 MONTHS DURATION: ICD-10-CM

## 2023-11-15 DIAGNOSIS — G89.29 CHRONIC BACK PAIN GREATER THAN 3 MONTHS DURATION: ICD-10-CM

## 2023-11-15 PROCEDURE — 37000008 HC ANESTHESIA 1ST 15 MINUTES: Performed by: ANESTHESIOLOGY

## 2023-11-15 PROCEDURE — D9220A PRA ANESTHESIA: Mod: CRNA,,,

## 2023-11-15 PROCEDURE — 25000003 PHARM REV CODE 250: Performed by: ANESTHESIOLOGY

## 2023-11-15 PROCEDURE — 63600175 PHARM REV CODE 636 W HCPCS

## 2023-11-15 PROCEDURE — 25000003 PHARM REV CODE 250

## 2023-11-15 PROCEDURE — 64483 NJX AA&/STRD TFRM EPI L/S 1: CPT | Mod: 50,,, | Performed by: ANESTHESIOLOGY

## 2023-11-15 PROCEDURE — 63600175 PHARM REV CODE 636 W HCPCS: Performed by: ANESTHESIOLOGY

## 2023-11-15 PROCEDURE — D9220A PRA ANESTHESIA: ICD-10-PCS | Mod: CRNA,,,

## 2023-11-15 PROCEDURE — 64483 NJX AA&/STRD TFRM EPI L/S 1: CPT | Mod: 50 | Performed by: ANESTHESIOLOGY

## 2023-11-15 PROCEDURE — D9220A PRA ANESTHESIA: Mod: ANES,,, | Performed by: ANESTHESIOLOGY

## 2023-11-15 PROCEDURE — D9220A PRA ANESTHESIA: ICD-10-PCS | Mod: ANES,,, | Performed by: ANESTHESIOLOGY

## 2023-11-15 PROCEDURE — 64483 PR EPIDURAL INJ, ANES/STEROID, TRANSFORAMINAL, LUMB/SACR, SNGL LEVL: ICD-10-PCS | Mod: 50,,, | Performed by: ANESTHESIOLOGY

## 2023-11-15 RX ORDER — LIDOCAINE HYDROCHLORIDE 10 MG/ML
INJECTION, SOLUTION EPIDURAL; INFILTRATION; INTRACAUDAL; PERINEURAL
Status: DISCONTINUED | OUTPATIENT
Start: 2023-11-15 | End: 2023-11-15 | Stop reason: HOSPADM

## 2023-11-15 RX ORDER — BUPIVACAINE HYDROCHLORIDE 2.5 MG/ML
INJECTION, SOLUTION EPIDURAL; INFILTRATION; INTRACAUDAL
Status: DISCONTINUED | OUTPATIENT
Start: 2023-11-15 | End: 2023-11-15 | Stop reason: HOSPADM

## 2023-11-15 RX ORDER — DEXAMETHASONE SODIUM PHOSPHATE 10 MG/ML
INJECTION INTRAMUSCULAR; INTRAVENOUS
Status: DISCONTINUED
Start: 2023-11-15 | End: 2023-11-15 | Stop reason: HOSPADM

## 2023-11-15 RX ORDER — LIDOCAINE HYDROCHLORIDE 10 MG/ML
INJECTION, SOLUTION EPIDURAL; INFILTRATION; INTRACAUDAL; PERINEURAL
Status: DISCONTINUED | OUTPATIENT
Start: 2023-11-15 | End: 2023-11-15

## 2023-11-15 RX ORDER — BUPIVACAINE HYDROCHLORIDE 2.5 MG/ML
INJECTION, SOLUTION EPIDURAL; INFILTRATION; INTRACAUDAL
Status: DISCONTINUED
Start: 2023-11-15 | End: 2023-11-15 | Stop reason: HOSPADM

## 2023-11-15 RX ORDER — PROPOFOL 10 MG/ML
VIAL (ML) INTRAVENOUS
Status: DISCONTINUED | OUTPATIENT
Start: 2023-11-15 | End: 2023-11-15

## 2023-11-15 RX ORDER — LIDOCAINE HYDROCHLORIDE 10 MG/ML
INJECTION, SOLUTION EPIDURAL; INFILTRATION; INTRACAUDAL; PERINEURAL
Status: DISCONTINUED
Start: 2023-11-15 | End: 2023-11-15 | Stop reason: HOSPADM

## 2023-11-15 RX ORDER — DEXAMETHASONE SODIUM PHOSPHATE 10 MG/ML
INJECTION INTRAMUSCULAR; INTRAVENOUS
Status: DISCONTINUED | OUTPATIENT
Start: 2023-11-15 | End: 2023-11-15 | Stop reason: HOSPADM

## 2023-11-15 RX ADMIN — LIDOCAINE HYDROCHLORIDE 50 MG: 10 INJECTION, SOLUTION EPIDURAL; INFILTRATION; INTRACAUDAL; PERINEURAL at 10:11

## 2023-11-15 RX ADMIN — PROPOFOL 60 MG: 10 INJECTION, EMULSION INTRAVENOUS at 10:11

## 2023-11-15 RX ADMIN — PROPOFOL 50 MG: 10 INJECTION, EMULSION INTRAVENOUS at 10:11

## 2023-11-15 RX ADMIN — PROPOFOL 20 MG: 10 INJECTION, EMULSION INTRAVENOUS at 10:11

## 2023-11-15 RX ADMIN — PROPOFOL 40 MG: 10 INJECTION, EMULSION INTRAVENOUS at 10:11

## 2023-11-15 NOTE — OP NOTE
Procedure:    Left L4  transforaminal epidural steroid injection    Right L4  transforaminal epidural steroid injection    Pre-Procedure Diagnoses:  Chronic back pain greater than 3 months  Lumbar degenerative disc disease  Lumbar radiculopathy  Lumbar disc displacement    Post-Procedure Diagnoses:  Chronic back pain greater than 3 months  Lumbar degenerative disc disease  Lumbar radiculopathy  Lumbar disc displacement    Anesthesia:  Local and MAC    Estimated Blood Loss:  < 2 ML    Consent:  The procedure, risks, benefits, and alternatives were discussed with the patient.  The patient voiced understanding and fully informed written consent was obtained.    Description of the Procedure:  The patient was taken to the operating room and placed in the prone position. The skin overlying the lumbar spine was prepped with Chloraprep and draped in the usual sterile fashion.  An oblique fluoroscopic view was obtained on the left side at L4, with the superior articular process of the inferior vertebral body aligned with the pedicle.  Skin anesthesia was achieved using 2 mL of lidocaine 1%.  A 22-gauge 7-inch Quinke spinal needle was inserted and advanced under intermittent fluoroscopic views into the epidural space. Proper needle position was confirmed under AP, oblique, and lateral fluoroscopic views.  Negative aspiration for blood or CSF was confirmed. 1 mL of contrast was injected, which revealed spread into the epidural space.  Then a combination of 5 mg of dexamethasone with 1 mL of 0.25% bupivacaine was easily injected.   There was no pain on injection. The needle was removed intact and bleeding was nil.  The same procedure was repeated in identical fashion on the right side at L4 .  Sterile bandages were applied. The patient was taken to the recovery room for further observation in stable condition. The patient was then discharged home with no complications.

## 2023-11-15 NOTE — ANESTHESIA POSTPROCEDURE EVALUATION
Anesthesia Post Evaluation    Patient: Eleanor Buckner    Procedure(s) Performed: Procedure(s) (LRB):  Injection,steroid,epidural,transforaminal approach (Bilateral)    Final Anesthesia Type: MAC      Patient location during evaluation: PACU  Patient participation: Yes- Able to Participate  Level of consciousness: awake and alert and oriented  Post-procedure vital signs: reviewed and stable  Pain management: adequate  Airway patency: patent  KI mitigation strategies: Verification of full reversal of neuromuscular block  PONV status at discharge: No PONV  Anesthetic complications: no      Cardiovascular status: blood pressure returned to baseline and stable  Respiratory status: spontaneous ventilation and unassisted  Hydration status: euvolemic  Follow-up not needed.  Comments: [facility]          Vitals Value Taken Time   /58 11/15/23 1130   Temp 36.1 °C (97 °F) 11/15/23 1100   Pulse 70 11/15/23 1130   Resp 20 11/15/23 1130   SpO2 96 % 11/15/23 1130         No case tracking events are documented in the log.      Pain/Nicko Score: Nicko Score: 10 (11/15/2023 11:18 AM)  Modified Nicko Score: 20 (11/15/2023 11:18 AM)

## 2023-11-15 NOTE — DISCHARGE SUMMARY
[facility] - Periop Services  Discharge Note  Short Stay    Procedure(s) (LRB):  Injection,steroid,epidural,transforaminal approach (Bilateral)      OUTCOME: Patient tolerated treatment/procedure well without complication and is now ready for discharge.    DISPOSITION: Home or Self Care    FINAL DIAGNOSIS:  <principal problem not specified>    FOLLOWUP: In clinic    DISCHARGE INSTRUCTIONS:  No discharge procedures on file.     TIME SPENT ON DISCHARGE: 5 minutes

## 2023-11-15 NOTE — DISCHARGE INSTRUCTIONS
MEDIAL BRANCH BLOCK/ EPIDURAL STEROID INJECTION, CARE AFTER    These instructions give you information on caring for yourself after your procedure. Your doctor may also give you specific instructions. Call your doctor if you have any problems or questions after your procedure.     HOME CARE  Do not drive or use any machinery for the next 24 hours.  Do not do any hard physical activity for the next 24 hours  No heavy lifting for one week  Apply ice pack for comfort  Do not use a heating pad in area of injection  You may go back to eating as usual  Remove bandaids tomorrow and then you may shower  No tub soaking for 3-5 days    SIDE EFFECTS THAT COULD HAPPEN UP TO 4 HOURS AFTER THE INJECTION  If you have leg weakness or numbness, have someone help you walk. If the weakness or numbness does not go away, or if it gets worse go to the emergency department.  If you have dizziness, lie down right away. This usually helps. Sit up slowly and then when you have been sitting for a few minutes then stand up.  If you have a mild headache, drink fluids (especially drinks with caffeine) Call your doctor if the headache gets worse or persists.  When the numbing medicine wears off you may feel some discomfort where you had the shot. It usually only lasts for a few days. You may put ice over the injection site. Leave ice on for 20 minutes at a time and protect your skin during use.   You may feel minor back pain and stiffness at the site of the shot. Call your doctor if this pain gets worse or does not improve. You may feel nauseous or vomit several hours after your procedure. If this happens, try drinking small amounts of clear liquids until you feel better. If you continue to feel nauseated or continue vomiting, get help right away.    Steroids may take several days to start working. The shot usually helps leg pain more than back pain. The shot will not fix what is causing the pain but may take away some of the pain. This pain  relief may last from 2 weeks to 6 months.     GET HELP RIGHT AWAY IF:  You have very bad pain, headache or neck pain or stiffness  There is a change in your vision (double or blurry vision)  You have a fever over 101 or chills  You have swelling or redness at the injection site  You get weaker  You are not able to control your bladder or bowels  You are not able to urinate

## 2023-11-15 NOTE — TRANSFER OF CARE
"Anesthesia Transfer of Care Note    Patient: Eleanor Buckner    Procedure(s) Performed: Procedure(s) (LRB):  Injection,steroid,epidural,transforaminal approach (Bilateral)    Patient location: OPS    Anesthesia Type: general    Transport from OR: Transported from OR on room air with adequate spontaneous ventilation    Post pain: adequate analgesia    Post assessment: no apparent anesthetic complications    Post vital signs: stable    Level of consciousness: awake    Nausea/Vomiting: no nausea/vomiting    Complications: none    Transfer of care protocol was followedComments: TIVA      Last vitals: Visit Vitals  BP (!) 121/52   Pulse 67   Temp 36.4 °C (97.6 °F) (Tympanic)   Resp 20   Ht 5' 1" (1.549 m)   Wt (!) 170.6 kg (376 lb)   SpO2 (!) 94%   Breastfeeding No   BMI 71.04 kg/m²     "

## 2023-11-15 NOTE — ANESTHESIA PREPROCEDURE EVALUATION
11/15/2023  Eleanor Buckner is a 68 y.o., female.  Injection,steroid,epidural,transforaminal approach (Bilateral: Back)  In Fac, [facility] OR 03  Anes: No responsible anesthesiologist  Provider: Cassi Muñoz MD  Bed Requested: None    Pre-op Assessment    I have reviewed the Patient Summary Reports.     I have reviewed the Nursing Notes. I have reviewed the NPO Status.   I have reviewed the Medications.     Review of Systems  Anesthesia Hx:  No problems with previous Anesthesia                Hematology/Oncology:  Hematology Normal   Oncology Normal                                   EENT/Dental:  EENT/Dental Normal           Cardiovascular:  Cardiovascular Normal Exercise tolerance: good                   Functional Capacity good / => 4 METS                         Pulmonary:    Asthma  Shortness of breath  Sleep Apnea                Renal/:   Denies Chronic Renal Disease.                Hepatic/GI:    Hiatal Hernia, GERD             Musculoskeletal:  Musculoskeletal Normal                Neurological:    Neuromuscular Disease,                                   Endocrine:  Endocrine Normal          Morbid Obesity / BMI > 40  Dermatological:  Skin Normal    Psych:  Psychiatric History                  Physical Exam  General: Alert, Oriented, Well nourished and Cooperative    Airway:  Mallampati: III   Mouth Opening: Normal  TM Distance: Normal  Tongue: Normal  Neck ROM: Normal ROM    Dental:  Intact    Chest/Lungs:  Clear to auscultation, Normal Respiratory Rate    Heart:  Rate: Normal  Rhythm: Regular Rhythm     Latest Reference Range & Units Most Recent   WBC 4.5 - 11.5 x10(3)/mcL 7.0  3/24/23 08:50   RBC 4.20 - 5.40 x10(6)/mcL 4.43  3/24/23 08:50   Hemoglobin 12.0 - 16.0 g/dL 12.2  3/24/23 08:50   Hematocrit 37.0 - 47.0 % 39.7  3/24/23 08:50   MCV 80.0 - 94.0 fL 89.6  3/24/23 08:50   MCH 27.0 - 31.0  pg 27.5  3/24/23 08:50   MCHC 33.0 - 36.0 g/dL 30.7 (L)  3/24/23 08:50   RDW 11.5 - 17.0 % 15.3  3/24/23 08:50   Platelet Count 130 - 400 x10(3)/mcL 250  3/24/23 08:50   MPV 7.4 - 10.4 fL 9.5  3/24/23 08:50   Neut % % 66.6  3/24/23 08:50   LYMPH % % 24.4  3/24/23 08:50   Mono % % 5.3  3/24/23 08:50   Eosinophil % % 3.1  3/24/23 08:50   Basophil % % 0.3  3/24/23 08:50   Immature Granulocytes % 0.3  3/24/23 08:50   Gran # (ANC) 2.10 - 9.20 x10(3)/mcL 3.36  11/4/21 10:25   Neut # 2.1 - 9.2 x10(3)/mcL 4.67  3/24/23 08:50   Lymph # 0.6 - 4.6 x10(3)/mcL 1.71  3/24/23 08:50   Mono # 0.1 - 1.3 x10(3)/mcL 0.37  3/24/23 08:50   Eos # 0 - 0.9 x10(3)/mcL 0.22  3/24/23 08:50   Baso # 0 - 0.2 x10(3)/mcL 0.02  3/24/23 08:50   Immature Grans (Abs) 0 - 0.04 x10(3)/mcL 0.02  3/24/23 08:50   nRBC 0.0 - 0.2 % 0.0  11/4/21 10:25   Sodium 136 - 145 mmol/L 143  3/24/23 08:50   Potassium 3.5 - 5.1 mmol/L 4.2  3/24/23 08:50   Chloride 98 - 107 mmol/L 105  3/24/23 08:50   CO2 23 - 31 mmol/L 29  3/24/23 08:50   BUN 9.8 - 20.1 mg/dL 19.9  3/24/23 08:50   Creatinine 0.55 - 1.02 mg/dL 0.91  3/24/23 08:50   eGFR mls/min/1.73/m2 >60  3/24/23 08:50   eGFR if non African American mls/min/1.73/m2 >60  7/13/22 14:11   eGFR if  >>=90  102  11/4/21 10:25   Glucose 82 - 115 mg/dL 125 (H)  3/24/23 08:50   Calcium 8.4 - 10.2 mg/dL 9.5  3/24/23 08:50   ALP 40 - 150 unit/L 98  3/24/23 08:50   PROTEIN TOTAL 5.8 - 7.6 gm/dL 7.8 (H)  3/24/23 08:50   Albumin 3.4 - 4.8 g/dL 3.6  3/24/23 08:50   Albumin/Globulin Ratio 1.1 - 2.0 ratio 0.9 (L)  3/24/23 08:50   BILIRUBIN TOTAL <=1.5 mg/dL 0.5  3/24/23 08:50   Bilirubin Direct 0.0 - 0.5 mg/dL 0.2  11/4/21 10:25   Bilirubin, Indirect 0.00 - 0.80 mg/dL 0.30  11/4/21 10:25   AST 5 - 34 unit/L 15  3/24/23 08:50   ALT 0 - 55 unit/L 15  3/24/23 08:50   Globulin, Total 2.4 - 3.5 gm/dL 4.2 (H)  3/24/23 08:50   Cholesterol Total <=200 mg/dL 190  3/24/23 08:50   HDL 35 - 60 mg/dL 69 (H)  3/24/23 08:50   Total  Cholesterol/HDL Ratio 0 - 5  3  3/24/23 08:50   Triglycerides 37 - 140 mg/dL 130  3/24/23 08:50   LDL Cholesterol 50.00 - 140.00 mg/dL 95.00  3/24/23 08:50   Very Low Density Lipoprotein  26  3/24/23 08:50   Vit D, 25-Hydroxy 30.0 - 80.0 ng/mL 21.4 (L)  3/24/23 08:50   Hemoglobin A1C External <=7.0 % 5.6  3/24/23 08:50   Estimated Avg Glucose mg/dL 114.0  3/24/23 08:50   TSH 0.3500 - 4.9400 uIU/mL 4.6920  7/13/22 14:11   T3, Free 2.18 - 3.98 pg/mL 2.29  3/11/20 09:04   Free T4 0.70 - 1.48 ng/dL 0.75  11/4/21 10:25   Color, UA Yellow, Light-Yellow, Dark Yellow, Ivette, Straw  Yellow  3/24/23 08:50   Appearance, UA Clear  Clear  3/24/23 08:50   Specific Gravity,UA  1.025  3/24/23 08:50   pH, UA 5.0 - 8.5  5.5  3/24/23 08:50   Protein, UA Negative mg/dL Negative  3/24/23 08:50   Glucose, UA Negative, Normal mg/dL Negative  3/24/23 08:50   Ketones, UA Negative mg/dL Negative  3/24/23 08:50   Occult Blood UA Negative unit/L Trace-Intact !  3/24/23 08:50   NITRITE UA Negative  Negative  3/24/23 08:50   UROBILINOGEN UA  1.0  4/7/22 14:17   Bilirubin (UA) Negative  Negative  4/7/22 14:17   Bilirubin, UA Negative mg/dL Negative  3/24/23 08:50   Urobilinogen, UA 0.2, 1.0, Normal mg/dL 0.2  3/24/23 08:50   Leukocytes, UA Negative unit/L Trace !  3/24/23 08:50   RBC, UA None Seen, 0-2, 3-5, 0-5 /HPF 0-2  3/24/23 08:50   WBC, UA None Seen, 0-2, 3-5, 0-5 /HPF 6-10 !  3/24/23 08:50   Bacteria, UA None Seen, Rare, Occasional /HPF Rare  3/24/23 08:50   Squam Epithel, UA None Seen, Rare, Occasional, Occ /HPF Moderate !  3/24/23 08:50   Creatinine, Urine 47.0 - 110.0 mg/dL 115.0 (H)  3/24/23 08:50   Urine Microalbumin <=30.0 ug/ml 20.8  3/24/23 08:50   MICROALB/CREAT RATIO 0.0 - 30.0 mg/gm Cr 18.1  3/24/23 08:50   Sample  unknown  9/12/22 10:27   POC Cardiac Troponin I <0.09 ng/mL 0.01  6/25/22 11:35   POC Creatinine 0.5 - 1.4 mg/dL 0.9  9/12/22 10:27   CT CHEST WITH CONTRAST  Rpt  9/12/22 10:54   CT LUMBAR SPINE WITH CONTRAST   Rpt  12/20/12 10:42   FL ESOPHAGRAM COMPLETE  Rpt  3/18/16 08:26   FL MYELOGRAM LUMBAR  Rpt  12/20/12 10:26   XR CHEST 1 VIEW  Rpt (E)  3/7/13 18:15   XR CHEST PA LATERAL WITH LORDOTIC VIEW  Rpt  5/21/21 10:26   XR FOOT 2 VIEW RIGHT  Rpt  4/7/22 14:34   XR FOREARM RIGHT  Rpt  7/14/22 13:45   XR HAND COMPLETE 3 VIEW RIGHT  Rpt  5/13/15 10:07   XR HUMERUS 2 VIEW LEFT  Rpt  5/7/21 15:01   XR KNEE 1 OR 2 VIEW RIGHT  Rpt  3/8/17 15:12   XR KNEE COMP 4 OR MORE VIEWS LEFT  Rpt  1/25/23 11:21   XR RIBS 2 VIEW RIGHT  Rpt  8/17/15 12:45   XR SHOULDER COMPLETE 2 OR MORE VIEWS BILATERAL  Rpt (E)  3/13/13 13:37   XR SHOULDER COMPLETE 2 OR MORE VIEWS RIGHT  Rpt  3/6/13 20:04   XR WRIST COMPLETE 3 VIEWS RIGHT  Rpt  10/12/22 08:49   XRAY PREVIOUS  Rpt  11/6/12 14:12   MAMMO DIGITAL SCREENING BILAT  Rpt  4/30/21 11:08   MAMMO DIGITAL SCREENING BILAT WITH BOO  Rpt  9/12/22 10:33   MRI LUMBAR SPINE WITHOUT CONTRAST  Rpt  1/9/23 09:17   DXA BONE DENSITY AXIAL SKELETON 1 OR MORE SITES  Rpt  5/24/23 13:09   EKG 12-LEAD  Rpt  8/4/22 10:14   NUCLEAR STRESS - CARDIOLOGY INTERPRETED (CUPID ONLY)  Rpt  5/13/22 09:58   ECHO (CUPID ONLY)  Rpt  12/6/22 12:10   Systolic blood pressure mmHg 113  5/13/22 09:58   Diastolic blood pressure mmHg 74  5/13/22 09:58   Ao peak enma m/s 1.62  12/6/22 12:10   Ao VTI cm 40.9  12/6/22 12:10   AV valve area cm2 2.02  12/6/22 12:10   AV index (prosthetic)  0.65  12/6/22 12:10   AV peak gradient mmHg 10  12/6/22 12:10   CARDIOLOGY REPORT  Rpt  11/4/21 21:46   CRC Recommendation External  Repeat colonoscopy in 10 years (E)  1/13/20 00:00   CV US LOWER VENOUS INSUFFICIENCY LEFT (CUPID ONLY)  Rpt !  9/29/22 13:27   CV US LOWER VENOUS INSUFFICIENCY RIGHT  Rpt  10/3/22 09:50   EF % 55  12/6/22 12:10   LVOT area cm2 3.1  12/6/22 12:10   Left Ventricular Outflow Tract Mean Gradient mmHg 2.37  12/6/22 12:10   Left Ventricular Outflow Tract Mean Velocity cm/s 0.72  12/6/22 12:10   MV valve area p 1/2 method cm2  4.18  12/6/22 12:10   MV valve area by continuity eq cm2 3.68  12/6/22 12:10   Nuc Rest Diastolic Volume Index  83  5/13/22 09:58   Nuc Rest Systolic Volume Index  30  5/13/22 09:58   Nuc Stress EF % 64  5/13/22 09:58   RPP  7,232  5/13/22 09:58   Summed difference  0  5/13/22 09:58   Summed rest score  1  5/13/22 09:58   Summed stress score  1  5/13/22 09:58   SURG - FL SURGERY FLUORO USAGE  Rpt  5/17/23 09:46   Triscuspid Valve Regurgitation Peak Gradient mmHg 33  12/6/22 12:10   XR HUMERUS AP LAT 2+ VIEWS  Rpt (E)  3/7/13 17:04   XR SHOULDER VIEWS  Rpt (E)  7/17/14 10:52   (L): Data is abnormally low  (H): Data is abnormally high  !: Data is abnormal  Rpt: View report in Results Review for more information  (E): External lab resultMild tricuspid regurgitation.  The left ventricle is normal in size with normal systolic function.  The estimated ejection fraction is 55%.  Normal right ventricular size with normal right ventricular systolic function.  Mild left atrial enlargement.  There is mild pulmonary hypertension.   est Reason : R06.00,     Vent. Rate : 060 BPM     Atrial Rate : 060 BPM      P-R Int : 206 ms          QRS Dur : 192 ms       QT Int : 500 ms       P-R-T Axes : -09 -77 122 degrees      QTc Int : 500 ms     AV dual-paced rhythm   Abnormal ECG   When compared with ECG of 04-AUG-2022 09:58,   Electronic ventricular pacemaker has replaced Sinus rhythm   Confirmed by Saleem Maria MD ([id number]     Anesthesia Plan  Type of Anesthesia, risks & benefits discussed:    Anesthesia Type: MAC  Intra-op Monitoring Plan: Standard ASA Monitors  Post Op Pain Control Plan: multimodal analgesia  Induction:  IV  Airway Plan: Direct  Informed Consent: Informed consent signed with the Patient and all parties understand the risks and agree with anesthesia plan.  All questions answered. Patient consented to blood products? Yes  ASA Score: 3  Day of Surgery Review of History & Physical: H&P Update referred to the  surgeon/provider.I have interviewed and examined the patient. I have reviewed the patient's H&P dated: There are no significant changes.     Ready For Surgery From Anesthesia Perspective.     .

## 2023-11-16 VITALS
OXYGEN SATURATION: 96 % | RESPIRATION RATE: 20 BRPM | DIASTOLIC BLOOD PRESSURE: 58 MMHG | HEART RATE: 70 BPM | BODY MASS INDEX: 55.32 KG/M2 | SYSTOLIC BLOOD PRESSURE: 116 MMHG | WEIGHT: 293 LBS | HEIGHT: 61 IN | TEMPERATURE: 97 F

## 2023-12-15 ENCOUNTER — OFFICE VISIT (OUTPATIENT)
Dept: PAIN MEDICINE | Facility: CLINIC | Age: 68
End: 2023-12-15
Payer: MEDICARE

## 2023-12-15 VITALS
SYSTOLIC BLOOD PRESSURE: 129 MMHG | WEIGHT: 293 LBS | BODY MASS INDEX: 55.32 KG/M2 | HEIGHT: 61 IN | DIASTOLIC BLOOD PRESSURE: 59 MMHG | HEART RATE: 65 BPM

## 2023-12-15 DIAGNOSIS — M54.42 CHRONIC BILATERAL LOW BACK PAIN WITH BILATERAL SCIATICA: ICD-10-CM

## 2023-12-15 DIAGNOSIS — E66.01 MORBID OBESITY: ICD-10-CM

## 2023-12-15 DIAGNOSIS — G89.29 CHRONIC BILATERAL LOW BACK PAIN WITH BILATERAL SCIATICA: ICD-10-CM

## 2023-12-15 DIAGNOSIS — M51.16 INTERVERTEBRAL DISC DISORDERS WITH RADICULOPATHY, LUMBAR REGION: Primary | ICD-10-CM

## 2023-12-15 DIAGNOSIS — M54.41 CHRONIC BILATERAL LOW BACK PAIN WITH BILATERAL SCIATICA: ICD-10-CM

## 2023-12-15 PROCEDURE — 99214 OFFICE O/P EST MOD 30 MIN: CPT | Mod: ,,, | Performed by: NURSE PRACTITIONER

## 2023-12-15 PROCEDURE — 99214 PR OFFICE/OUTPT VISIT, EST, LEVL IV, 30-39 MIN: ICD-10-PCS | Mod: ,,, | Performed by: NURSE PRACTITIONER

## 2023-12-15 NOTE — PROGRESS NOTES
"Subjective:      Patient ID: Eleanor Buckner is a 68 y.o. female.    Chief Complaint: Low-back Pain (Post op procedure 11/15/23 states procedure helped some still can't stand for a long time states feels like she will fall if she dont sit, not taking pain meds C/O pain level 5.)    Referred by: No ref. provider found     HPI:  This is a pleasant 68-year-old female patient presenting as a follow-up for pain associated with intervertebral disc disorders with radiculopathy in the lumbar region and bilateral sciatica after receiving a bilateral transforaminal epidural steroid injection to L4 on 11/15/2023.  Since this injection her pain is managed per report, with the exception of her ongoing bilateral low back pain that is more axial mediated in his worse when she stands too long twist at the waist and leans back.  Of note in the past she has tried a diagnostic medial branch lumbar block that was ineffective to treat her facet arthropathy.. No numbness to either foot.  No diabetes history    Her primary concern today is that her legs get weak and she feels like she is going to fall if she does not sit down.  She denies numbness to her feet.  She has not completed a nerve conduction study or EMG for her bilateral lower legs.  Patient's current weight is 376 lb she is 5 ft 1 in tall and has a body mass index of 71.      .She has osteopenia and takes Calcium, Vitamin D and Fosamax for prior prevention of fractures.      Vital signs:   Vitals:    12/15/23 1013 12/15/23 1015   BP: (!) 129/59    Pulse: 65    Weight: (!) 170.6 kg (376 lb)    Height: 5' 1" (1.549 m)    PainSc:    5     Body mass index is 71.04 kg/m².  Pain Disability Index (PDI): 54       Interventional Pain History  11/15/2023:  Bilateral transforaminal epidural steroid injection L4  05/17/2023: Transforaminal epidural steroid injection bilateral L4  04/05/2023: Right L3 and right L4 transforaminal epidural steroid injections     ROS:  Low back pain with " bilateral sciatica     MRI lumbar spine 01/09/2023:   FINDINGS:  For the purpose of this report, the most inferior well developed intervertebral disc space is presumed to represent L5-S1. Lumbar vertebrae stature is unremarkable alignment is preserved.  There is a meningioma which involves T11 vertebral body.  There are no acute marrow edematous signals.  The visualized thoracic cord is unremarkable. The conus medullaris terminates at L1-L2.  Disc segmental analysis is given below:     At L1-L2, disc is unremarkable.  There is moderate central canal stenosis which is caused by facet arthropathy.  There are no narrowings of the neural foramen.     At L2-L3, disc is unremarkable.  Central canal is not stenosed and there are no narrowings of the neural foramen     At L3-L4, there is broad disc bulge which mildly compresses the ventral thecal sac.  Bilateral facet arthropathy and ligamentum flavum thickening.  These findings combine to cause moderate central canal stenosis.  There are no narrowings of the neural foramen.     At L4-L5, there is disc bulge which slightly flattens the ventral thecal sac.  There is ligamentum flavum thickening and facet arthropathy.  These findings result in moderate central canal stenosis.  There are no narrowings of the neural foramen.     At L5-S1, there is bulging of annulus fibrosis which slightly flattens the ventral thecal sac bilateral facet arthropathy.  There is no significant central canal stenosis.  There also no significant narrowings of the neural foramen     Impression:     Lumbar degenerative disc disease and spondylosis level by level discussed above.            Objective:          Physical Exam  General: Morbidlly obese  A&O x 3; No anxiety/depression; NAD  Mental Status: Oriented to person, place and time. Displays appropriate mood & affect.  Head: Norm cephalic and atraumatic  Neck: full ROM without deficits bilateral movements Land R, flexion and extension. No cervical  paraspinal banding.   Eyes: normal conjunctiva, normal lids, normal pupils  ENT and mouth: normal external ear, nose, and no lesions noted on the lips.  Respiratory: Symmetrical, Unlabored  Cardiovascular Normal rate and Rhythm. + swelling both ankles  Abdomen: Pendulous     Extremities:  Gen: No cyanosis or tenderness to palpation bilateral LE  Skin: Warm, pink, dry, no rashes, no lesions on the lumbar spine  Strength: 4/5 motor strength bilateral LE  ROM: Full ROM in right knee. Left knee weakness. Bilateral ankles without pain or instability.     Neuro:  Gait: antalgic gait improved since last visit as she was not able to stand on her right leg. Presented in clinic with wheel chair. Unable to ambulate without rollator.. .  Normal toe and heel raise  DTR's: 2+ in bilateral patellar,  Sensory: Intact to light touch bilateral  upper and lower extremities with numbness to right knee only.       Assessment:     Excellent pain control bilateral low back and legs.   Pt concerned w/ braeden leg weakness no DM or peripheral neuropathy  Rec f/u PCP for dietician consult.   Pt would like to try weight loss before EMG/NCS to bilateral lower extremites  Goal weight loss 15 pounds by next office visit in 8 weeks + discuss healthy weight loss options. Mediterranean given to patient.    She has lumbar facet arthropathy throughout spine. In Feb of this year she completed MBBs to lumbar spine with no pain relief.     Cc sent to her PCP, Patience BLANCO for continuity of care.         Encounter Diagnoses   Name Primary?    Intervertebral disc disorders with radiculopathy, lumbar region Yes    Chronic bilateral low back pain with bilateral sciatica     Morbid obesity          Plan:       Eleanor was seen today for low-back pain.    Diagnoses and all orders for this visit:    Intervertebral disc disorders with radiculopathy, lumbar region    Chronic bilateral low back pain with bilateral sciatica    Morbid obesity               Past  Medical History:   Diagnosis Date    Anxiety disorder, unspecified     Asthma     Depression     Digestive disorder     reflux    Fibromyalgia     Hiatal hernia     Hyperlipemia     Neuropathy     feet    PVD (peripheral vascular disease)     Restless leg syndrome     Short of breath on exertion     Sleep apnea, unspecified     cpap       Past Surgical History:   Procedure Laterality Date    APPENDECTOMY      CHOLECYSTECTOMY      COLONOSCOPY  01/13/2020    ESOPHAGOGASTRODUODENOSCOPY  06/10/2016    INJECTION OF ANESTHETIC AGENT AROUND MEDIAL BRANCH NERVES INNERVATING LUMBAR FACET JOINT Right 02/22/2023    Procedure: Block-nerve-medial branch-lumbar;  Surgeon: Cassi Muñoz MD;  Location: [facility] OR;  Service: Pain Management;  Laterality: Right;  L4/L5 and L5/S1    JOINT REPLACEMENT Right 03-    total shoulder    OPEN REDUCTION AND INTERNAL FIXATION (ORIF) OF FRACTURE OF DISTAL RADIUS Right 07/14/2022    Procedure: ORIF, FRACTURE, RADIUS, DISTAL;  Surgeon: Emery Morel MD;  Location: [facility];  Service: Orthopedics;  Laterality: Right;    PROCTOSCOPY  03/23/2016    TRANSFORAMINAL EPIDURAL INJECTION OF STEROID Right 4/5/2023    Procedure: Injection,steroid,epidural,transforaminal approach;  Surgeon: Cassi Muñoz MD;  Location: [facility] OR;  Service: Pain Management;  Laterality: Right;  TFESI on Right at L3/L4    TRANSFORAMINAL EPIDURAL INJECTION OF STEROID Bilateral 5/17/2023    Procedure: Injection,steroid,epidural,transforaminal approach;  Surgeon: Cassi Muñoz MD;  Location: [facility] OR;  Service: Pain Management;  Laterality: Bilateral;  Transforaminal Epidural Steroid Injection  Bilateral L4    TRANSFORAMINAL EPIDURAL INJECTION OF STEROID Bilateral 11/15/2023    Procedure: Injection,steroid,epidural,transforaminal approach;  Surgeon: Cassi Muñoz MD;  Location: [facility] OR;  Service: Pain Management;  Laterality: Bilateral;  Bilateral TFESI L4       Family History   Problem Relation Age of Onset     Heart failure Mother     Heart disease Mother     Osteoporosis Sister     Osteoporosis Brother     Gout Brother     Arthritis Father        Social History     Socioeconomic History    Marital status:    Tobacco Use    Smoking status: Never    Smokeless tobacco: Never    Tobacco comments:     None   Substance and Sexual Activity    Alcohol use: Never    Drug use: Never    Sexual activity: Yes     Partners: Male     Birth control/protection: Partner-Vasectomy       Current Outpatient Medications   Medication Sig Dispense Refill    acetaminophen (TYLENOL) 500 MG tablet Take 1,000 mg by mouth nightly.      albuterol (PROVENTIL/VENTOLIN HFA) 90 mcg/actuation inhaler Inhale into the lungs every 4 (four) hours as needed.      alendronate (FOSAMAX) 70 MG tablet Take 1 tablet (70 mg total) by mouth once a week. (Patient taking differently: Take 70 mg by mouth every 7 days.) 12 tablet 2    amLODIPine (NORVASC) 2.5 MG tablet Take 2.5 mg by mouth 2 (two) times daily.      baclofen (LIORESAL) 10 MG tablet Take 1 tablet (10 mg total) by mouth 3 (three) times daily. (Patient taking differently: Take 10 mg by mouth nightly.) 270 tablet 2    budesonide (RINOCORT AQUA) 32 mcg/actuation nasal spray 1 spray by Nasal route as needed.      buPROPion (WELLBUTRIN XL) 300 MG 24 hr tablet Take 1 tablet (300 mg total) by mouth once daily. 90 tablet 1    busPIRone (BUSPAR) 5 MG Tab Take 1 tablet (5 mg total) by mouth 3 (three) times daily. 90 tablet 3    dexbrompheniramine maleate (ALA-HIST IR) 2 mg Tab Take 1 tablet by mouth as needed.      docusate sodium 100 mg capsule Take 100 mg by mouth daily as needed.      ergocalciferol (ERGOCALCIFEROL) 50,000 unit Cap Take 1 capsule (50,000 Units total) by mouth every 7 days. 12 capsule 2    esomeprazole (NEXIUM) 40 MG capsule Take 1 capsule (40 mg total) by mouth before breakfast. 90 capsule 2    fluticasone propionate (FLONASE) 50 mcg/actuation nasal spray 2 sprays by Each Nostril route as  needed.      folic acid (FOLVITE) 1 MG tablet Take 1 mg by mouth once daily.      loratadine (CLARITIN) 10 mg tablet Take 1 tablet (10 mg total) by mouth once daily. (Patient taking differently: Take 10 mg by mouth nightly.) 90 tablet 2    potassium chloride (KLOR-CON) 10 MEQ TbSR Take 10 mEq by mouth daily as needed.      pramipexole (MIRAPEX) 0.5 MG tablet Take 1 tablet (0.5 mg total) by mouth every evening. 90 tablet 0    ranolazine (RANEXA) 500 MG Tb12 Take 500 mg by mouth 2 (two) times daily.      ranolazine (RANEXA) 500 MG Tb12 Take 500 mg by mouth 2 (two) times daily.      rosuvastatin (CRESTOR) 10 MG tablet Take 1 tablet (10 mg total) by mouth every evening. 90 tablet 2    sertraline (ZOLOFT) 100 MG tablet Take 2 tablets (200 mg total) by mouth every evening. 180 tablet 1    traZODone (DESYREL) 50 MG tablet Take 0.5 tablets (25 mg total) by mouth every evening. 45 tablet 1    VENTOLIN HFA 90 mcg/actuation inhaler Inhale 2 puffs into the lungs every 6 (six) hours as needed for Shortness of Breath. 18 g 2    vitamin D (VITAMIN D3) 1000 units Tab Take 1,000 Units by mouth once daily.      furosemide (LASIX) 20 MG tablet Take 1 tablet (20 mg total) by mouth daily as needed (swelling). 30 tablet 6     No current facility-administered medications for this visit.       Review of patient's allergies indicates:   Allergen Reactions    Nsaids (non-steroidal anti-inflammatory drug) Nausea And Vomiting

## 2023-12-15 NOTE — TELEPHONE ENCOUNTER
Pt called and states she went to the lab to have labs drawn and they told her no orders were in the system and she wanted to know why. I notified pt she doesn't even have an appointment scheduled in the future. I scheduled an appointment for 1/16/24. Pt is requesting lab orders be put in for her before her appointment. Pt also requesting refills on Vitamin D, Crestor and Fosamax. LOV was on 3/29/23. She no showed her last 2 appointments in October and September and canceled 2 appts in July..

## 2024-01-03 RX ORDER — ROSUVASTATIN CALCIUM 10 MG/1
10 TABLET, COATED ORAL NIGHTLY
Qty: 90 TABLET | Refills: 2 | OUTPATIENT
Start: 2024-01-03

## 2024-01-03 RX ORDER — ALENDRONATE SODIUM 70 MG/1
70 TABLET ORAL WEEKLY
Qty: 12 TABLET | Refills: 2 | OUTPATIENT
Start: 2024-01-03

## 2024-01-03 RX ORDER — CHOLECALCIFEROL (VITAMIN D3) 25 MCG
1000 TABLET ORAL DAILY
OUTPATIENT
Start: 2024-01-03

## 2024-01-12 ENCOUNTER — TELEPHONE (OUTPATIENT)
Dept: INTERNAL MEDICINE | Facility: CLINIC | Age: 69
End: 2024-01-12
Payer: MEDICARE

## 2024-01-12 DIAGNOSIS — I10 HYPERTENSION, UNSPECIFIED TYPE: ICD-10-CM

## 2024-01-12 DIAGNOSIS — E78.5 HYPERLIPIDEMIA LDL GOAL <100: Chronic | ICD-10-CM

## 2024-01-12 DIAGNOSIS — R73.01 IMPAIRED FASTING GLUCOSE: ICD-10-CM

## 2024-01-12 DIAGNOSIS — M81.0 AGE-RELATED OSTEOPOROSIS WITHOUT CURRENT PATHOLOGICAL FRACTURE: Primary | Chronic | ICD-10-CM

## 2024-01-12 NOTE — TELEPHONE ENCOUNTER
Pt would to know can you place orders before her next visit.    LOV:3/29/23  NS:9/26/23, 10/27/23  NOV:1/16/24

## 2024-01-13 ENCOUNTER — LAB VISIT (OUTPATIENT)
Dept: LAB | Facility: HOSPITAL | Age: 69
End: 2024-01-13
Attending: NURSE PRACTITIONER
Payer: MEDICARE

## 2024-01-13 DIAGNOSIS — I10 HYPERTENSION, UNSPECIFIED TYPE: ICD-10-CM

## 2024-01-13 DIAGNOSIS — M81.0 AGE-RELATED OSTEOPOROSIS WITHOUT CURRENT PATHOLOGICAL FRACTURE: Chronic | ICD-10-CM

## 2024-01-13 DIAGNOSIS — R73.01 IMPAIRED FASTING GLUCOSE: ICD-10-CM

## 2024-01-13 DIAGNOSIS — E78.5 HYPERLIPIDEMIA LDL GOAL <100: Chronic | ICD-10-CM

## 2024-01-13 LAB
ALBUMIN SERPL-MCNC: 3.7 G/DL (ref 3.4–4.8)
ALBUMIN/GLOB SERPL: 0.9 RATIO (ref 1.1–2)
ALP SERPL-CCNC: 98 UNIT/L (ref 40–150)
ALT SERPL-CCNC: 21 UNIT/L (ref 0–55)
APPEARANCE UR: ABNORMAL
AST SERPL-CCNC: 19 UNIT/L (ref 5–34)
BACTERIA #/AREA URNS AUTO: ABNORMAL /HPF
BASOPHILS # BLD AUTO: 0.01 X10(3)/MCL
BASOPHILS NFR BLD AUTO: 0.2 %
BILIRUB SERPL-MCNC: 0.5 MG/DL
BILIRUB UR QL STRIP.AUTO: NEGATIVE
BUN SERPL-MCNC: 16.5 MG/DL (ref 9.8–20.1)
CALCIUM SERPL-MCNC: 9.5 MG/DL (ref 8.4–10.2)
CHLORIDE SERPL-SCNC: 103 MMOL/L (ref 98–107)
CHOLEST SERPL-MCNC: 251 MG/DL
CHOLEST/HDLC SERPL: 4 {RATIO} (ref 0–5)
CO2 SERPL-SCNC: 29 MMOL/L (ref 23–31)
COLOR UR AUTO: YELLOW
CREAT SERPL-MCNC: 0.85 MG/DL (ref 0.55–1.02)
CREAT UR-MCNC: 127.5 MG/DL (ref 45–106)
DEPRECATED CALCIDIOL+CALCIFEROL SERPL-MC: 18.3 NG/ML (ref 30–80)
EOSINOPHIL # BLD AUTO: 0.15 X10(3)/MCL (ref 0–0.9)
EOSINOPHIL NFR BLD AUTO: 2.7 %
ERYTHROCYTE [DISTWIDTH] IN BLOOD BY AUTOMATED COUNT: 15.3 % (ref 11.5–17)
EST. AVERAGE GLUCOSE BLD GHB EST-MCNC: 114 MG/DL
GFR SERPLBLD CREATININE-BSD FMLA CKD-EPI: >60 MLS/MIN/1.73/M2
GLOBULIN SER-MCNC: 4.1 GM/DL (ref 2.4–3.5)
GLUCOSE SERPL-MCNC: 118 MG/DL (ref 82–115)
GLUCOSE UR QL STRIP.AUTO: NEGATIVE
HBA1C MFR BLD: 5.6 %
HCT VFR BLD AUTO: 41.8 % (ref 37–47)
HDLC SERPL-MCNC: 61 MG/DL (ref 35–60)
HGB BLD-MCNC: 12.1 G/DL (ref 12–16)
IMM GRANULOCYTES # BLD AUTO: 0.02 X10(3)/MCL (ref 0–0.04)
IMM GRANULOCYTES NFR BLD AUTO: 0.4 %
KETONES UR QL STRIP.AUTO: NEGATIVE
LDLC SERPL CALC-MCNC: 162 MG/DL (ref 50–140)
LEUKOCYTE ESTERASE UR QL STRIP.AUTO: ABNORMAL
LYMPHOCYTES # BLD AUTO: 1.43 X10(3)/MCL (ref 0.6–4.6)
LYMPHOCYTES NFR BLD AUTO: 25.4 %
MCH RBC QN AUTO: 26.5 PG (ref 27–31)
MCHC RBC AUTO-ENTMCNC: 28.9 G/DL (ref 33–36)
MCV RBC AUTO: 91.5 FL (ref 80–94)
MICROALBUMIN UR-MCNC: 17.9 UG/ML
MICROALBUMIN/CREAT RATIO PNL UR: 14 MG/GM CR (ref 0–30)
MONOCYTES # BLD AUTO: 0.31 X10(3)/MCL (ref 0.1–1.3)
MONOCYTES NFR BLD AUTO: 5.5 %
NEUTROPHILS # BLD AUTO: 3.72 X10(3)/MCL (ref 2.1–9.2)
NEUTROPHILS NFR BLD AUTO: 65.8 %
NITRITE UR QL STRIP.AUTO: NEGATIVE
PH UR STRIP.AUTO: 5.5 [PH]
PLATELET # BLD AUTO: 225 X10(3)/MCL (ref 130–400)
PMV BLD AUTO: 10.1 FL (ref 7.4–10.4)
POTASSIUM SERPL-SCNC: 4.4 MMOL/L (ref 3.5–5.1)
PROT SERPL-MCNC: 7.8 GM/DL (ref 5.8–7.6)
PROT UR QL STRIP.AUTO: NEGATIVE
RBC # BLD AUTO: 4.57 X10(6)/MCL (ref 4.2–5.4)
RBC #/AREA URNS AUTO: ABNORMAL /HPF
RBC UR QL AUTO: NEGATIVE
SODIUM SERPL-SCNC: 142 MMOL/L (ref 136–145)
SP GR UR STRIP.AUTO: 1.02 (ref 1–1.03)
SQUAMOUS #/AREA URNS AUTO: ABNORMAL /HPF
TRIGL SERPL-MCNC: 139 MG/DL (ref 37–140)
TSH SERPL-ACNC: 3.14 UIU/ML (ref 0.35–4.94)
UROBILINOGEN UR STRIP-ACNC: 0.2
VLDLC SERPL CALC-MCNC: 28 MG/DL
WBC # SPEC AUTO: 5.64 X10(3)/MCL (ref 4.5–11.5)
WBC #/AREA URNS AUTO: ABNORMAL /HPF

## 2024-01-13 PROCEDURE — 81003 URINALYSIS AUTO W/O SCOPE: CPT

## 2024-01-13 PROCEDURE — 82043 UR ALBUMIN QUANTITATIVE: CPT

## 2024-01-13 PROCEDURE — 83036 HEMOGLOBIN GLYCOSYLATED A1C: CPT

## 2024-01-13 PROCEDURE — 82306 VITAMIN D 25 HYDROXY: CPT

## 2024-01-13 PROCEDURE — 36415 COLL VENOUS BLD VENIPUNCTURE: CPT

## 2024-01-13 PROCEDURE — 80053 COMPREHEN METABOLIC PANEL: CPT

## 2024-01-13 PROCEDURE — 84443 ASSAY THYROID STIM HORMONE: CPT

## 2024-01-13 PROCEDURE — 85025 COMPLETE CBC W/AUTO DIFF WBC: CPT

## 2024-01-13 PROCEDURE — 80061 LIPID PANEL: CPT

## 2024-01-13 PROCEDURE — 87086 URINE CULTURE/COLONY COUNT: CPT

## 2024-01-15 LAB
BACTERIA UR CULT: ABNORMAL

## 2024-01-23 NOTE — TELEPHONE ENCOUNTER
"----- Message from Leatha Guevara sent at 1/23/2024 11:39 AM CST -----  Regarding: refill request  Type:  RX Refill Request    Who Called:  pt    Refill or New Rx:refill    RX Name and Strength:  pramipexole (MIRAPEX) 0.5 MG tablet    How is the patient currently taking it? (ex. 1XDay): one day    Is this a 30 day or 90 day RX: 30    Preferred Pharmacy with phone number: [facility] pharmacy    Local or Mail Order: local    Ordering Provider: suman    Would the patient rather a call back or a response via [facility]?  Yes if needed    Best Call Back Number: [phone]    Additional Information:  pt says she is mixed with covid virus (her son and his wife has it they live with pt and she does not want to catch the virus) pt wanted to know if a z-pac ( just for precaution pt says says she is coughing, has sinus drip, scratchy throat, "no covid test taken")  if could be prescribed pt would like it sent to ( [facility]), please advise, thanks       "

## 2024-01-25 ENCOUNTER — TELEPHONE (OUTPATIENT)
Dept: INTERNAL MEDICINE | Facility: CLINIC | Age: 69
End: 2024-01-25
Payer: MEDICARE

## 2024-01-25 ENCOUNTER — E-VISIT (OUTPATIENT)
Dept: INTERNAL MEDICINE | Facility: CLINIC | Age: 69
End: 2024-01-25
Payer: MEDICARE

## 2024-01-25 ENCOUNTER — PATIENT MESSAGE (OUTPATIENT)
Dept: INTERNAL MEDICINE | Facility: CLINIC | Age: 69
End: 2024-01-25

## 2024-01-25 DIAGNOSIS — U07.1 COVID: Primary | ICD-10-CM

## 2024-01-25 PROCEDURE — 99421 OL DIG E/M SVC 5-10 MIN: CPT | Mod: ,,, | Performed by: NURSE PRACTITIONER

## 2024-01-25 RX ORDER — ERGOCALCIFEROL 1.25 MG/1
50000 CAPSULE ORAL
Qty: 4 CAPSULE | Refills: 0 | Status: SHIPPED | OUTPATIENT
Start: 2024-01-25 | End: 2024-02-09 | Stop reason: SDUPTHER

## 2024-01-25 RX ORDER — ALENDRONATE SODIUM 70 MG/1
70 TABLET ORAL WEEKLY
Qty: 4 TABLET | Refills: 0 | Status: SHIPPED | OUTPATIENT
Start: 2024-01-25 | End: 2024-02-09 | Stop reason: SDUPTHER

## 2024-01-25 RX ORDER — NIRMATRELVIR AND RITONAVIR 300-100 MG
KIT ORAL
Qty: 1 TABLET | Refills: 0 | Status: SHIPPED | OUTPATIENT
Start: 2024-01-25 | End: 2024-02-09 | Stop reason: ALTCHOICE

## 2024-01-25 RX ORDER — ROSUVASTATIN CALCIUM 10 MG/1
10 TABLET, COATED ORAL NIGHTLY
Qty: 30 TABLET | Refills: 0 | Status: SHIPPED | OUTPATIENT
Start: 2024-01-25 | End: 2024-02-09 | Stop reason: SDUPTHER

## 2024-01-25 NOTE — TELEPHONE ENCOUNTER
----- Message from Leatha Guevara sent at 1/25/2024 11:46 AM CST -----  Regarding: refill request  Type:  RX Refill Request    Who Called:  pt    Refill or New Rx: refill    RX Name and Strength: rosuvastatin (CRESTOR) 10 MG tablet  How is the patient currently taking it? (ex. 1XDay): one day  Is this a 30 day or 90 day RX: 90    RX Name and Strength: vitamin D (VITAMIN D3) 1000 units Tab (pt stated her bottle says 2000 units)  How is the patient currently taking it? (ex. 1XDay): one day   Is this a 30 day or 90 day RX:30    RX Name and Strength: alendronate (FOSAMAX) 70 MG tablet  How is the patient currently taking it? (ex. 1XDay): one week  Is this a 30 day or 90 day RX: 12      Preferred Pharmacy with phone number: [facility]   Local or Mail Order: local    Ordering Provider: suman    Would the patient rather a call back or a response via [facility]?  Yes if needed    Best Call Back Number:[phone]    Additional Information:  refill request, pt stated

## 2024-01-25 NOTE — TELEPHONE ENCOUNTER
----- Message from Leatha Guevara sent at 1/25/2024 11:42 AM CST -----  Regarding: medical advice  Type:  Needs Medical Advice    Who Called:   pt    Symptoms (please be specific): stuff nose, coughing (green mucus), light fever    How long has patient had these symptoms:   two days    Pharm[facility] name and phone #:   [email or web address]    Would the patient rather a call back or a response via [facility]?  Yes    Best Call Back Number:  [phone]    Additional Information:  pt stated she took a home covid test 1/25/24 (postitive), and would like some something called to pharmacy for symptoms, please advise, thanks

## 2024-01-26 ENCOUNTER — TELEPHONE (OUTPATIENT)
Dept: INTERNAL MEDICINE | Facility: CLINIC | Age: 69
End: 2024-01-26
Payer: MEDICARE

## 2024-01-26 RX ORDER — PRAMIPEXOLE DIHYDROCHLORIDE 0.5 MG/1
0.5 TABLET ORAL NIGHTLY
Qty: 90 TABLET | Refills: 0 | Status: SHIPPED | OUTPATIENT
Start: 2024-01-26 | End: 2024-05-10 | Stop reason: SDUPTHER

## 2024-01-26 NOTE — PROGRESS NOTES
Patient ID: Eleanor Buckner is a 68 y.o. female.    Chief Complaint: URI (Entered automatically based on patient selection in Patient Portal.)    The patient initiated a request through [employer] on 1/25/2024 for evaluation and management with a chief complaint of URI (Entered automatically based on patient selection in Patient Portal.)     I evaluated the questionnaire submission on 01/25/2024.    [facility] Peq Evisit Upper Respitatory/Cough Questionnaire    1/25/2024  1:31 PM CST - Filed by Patient   Do you agree to participate in an E-Visit? Yes   If you have any of the following symptoms, please present to your local ER or call 911:  I acknowledge   Choose the state of your primary residence [place]   What is the main issue that you would like for your doctor to address today? I have Covid   Are you able to take your vital signs? Yes   Systolic Blood Pressure: 124   Diastolic Blood Pressure: 69   Weight: 365   Height: 61   Pulse: 87   Temperature: 99.5   Respiration rate: 81   Pulse Oxygen: 95   What symptoms do you currently have?  Chills;  Cough;  Headache;  Nasal Congestion;  Muscle or body aches;  Runny nose;  Sore throat;  Ear pain;  Neck pain   Describe your cough: Productive (containing mucus)   Describe the mucus: Green;  Thick   Have you had any of the following? Difficulty breathing   Please enter a few details about your swallowing, breathing, or visual problems. When i cough   Have you ever smoked? I have never smoked   Have you had a fever? Yes   What has been the range of your fever? Less than 100.4   When did your symptoms first appear? 1/23/2024   In the last two weeks, have you been in close contact with someone who has COVID-19 or the Flu? Yes , Covid-19   In the last two weeks, have you worked or volunteered in a healthcare facility or as a ? Healthcare facilities include a hospital, medical or dental clinic, long-term care facility, or nursing home No   Do you live in a  long-term care facility, nursing home, group home, or homeless shelter? No   List what you have done or taken to help your symptoms. Day cold and flu relief , mucus and pain relief   How severe are your symptoms? Moderate   Have your symptoms improved since they first appeared? No change   Have you taken an at home Covid test? Yes   What were the results? Positive   Have you taken a Flu test? No   Have you been fully vaccinated for COVID? (2 Pfizer, 2 Moderna or 1 Juanjo & Juanjo vaccine injections) Yes   Have you received a booster? No   Have you recieved a Flu shot? No   Do you have transportation to get tested for COVID if it is indicated and ordered for you at an [facility] location? No   Provide any information you feel is important to your history not asked above    Please attach any relevant images or files          Recent Labs Obtained:  No visits with results within 7 Day(s) from this visit.   Latest known visit with results is:   Lab Visit on 01/13/2024   Component Date Value Ref Range Status    Sodium Level 01/13/2024 142  136 - 145 mmol/L Final    Potassium Level 01/13/2024 4.4  3.5 - 5.1 mmol/L Final    Chloride 01/13/2024 103  98 - 107 mmol/L Final    Carbon Dioxide 01/13/2024 29  23 - 31 mmol/L Final    Glucose Level 01/13/2024 118 (H)  82 - 115 mg/dL Final    Blood Urea Nitrogen 01/13/2024 16.5  9.8 - 20.1 mg/dL Final    Creatinine 01/13/2024 0.85  0.55 - 1.02 mg/dL Final    Calcium Level Total 01/13/2024 9.5  8.4 - 10.2 mg/dL Final    Protein Total 01/13/2024 7.8 (H)  5.8 - 7.6 gm/dL Final    Albumin Level 01/13/2024 3.7  3.4 - 4.8 g/dL Final    Globulin 01/13/2024 4.1 (H)  2.4 - 3.5 gm/dL Final    Albumin/Globulin Ratio 01/13/2024 0.9 (L)  1.1 - 2.0 ratio Final    Bilirubin Total 01/13/2024 0.5  <=1.5 mg/dL Final    Alkaline Phosphatase 01/13/2024 98  40 - 150 unit/L Final    Alanine Aminotransferase 01/13/2024 21  0 - 55 unit/L Final    Aspartate Aminotransferase 01/13/2024 19  5 - 34 unit/L Final     eGFR 01/13/2024 >60  mls/min/1.73/m2 Final    Cholesterol Total 01/13/2024 251 (H)  <=200 mg/dL Final    HDL Cholesterol 01/13/2024 61 (H)  35 - 60 mg/dL Final    Triglyceride 01/13/2024 139  37 - 140 mg/dL Final    Cholesterol/HDL Ratio 01/13/2024 4  0 - 5 Final    Very Low Density Lipoprotein 01/13/2024 28   Final    LDL Cholesterol 01/13/2024 162.00 (H)  50.00 - 140.00 mg/dL Final    Hemoglobin A1c 01/13/2024 5.6  <=7.0 % Final    Estimated Average Glucose 01/13/2024 114.0  mg/dL Final    Color, UA 01/13/2024 Yellow  Yellow, Light-Yellow, Dark Yellow, Ivette, Straw Final    Appearance, UA 01/13/2024 Slightly Cloudy (A)  Clear Final    Specific Gravity, UA 01/13/2024 1.025  1.005 - 1.030 Final    pH, UA 01/13/2024 5.5  5.0 - 8.5 Final    Protein, UA 01/13/2024 Negative  Negative Final    Glucose, UA 01/13/2024 Negative  Negative, Normal Final    Ketones, UA 01/13/2024 Negative  Negative Final    Blood, UA 01/13/2024 Negative  Negative Final    Bilirubin, UA 01/13/2024 Negative  Negative Final    Urobilinogen, UA 01/13/2024 0.2  0.2, 1.0, Normal Final    Nitrites, UA 01/13/2024 Negative  Negative Final    Leukocyte Esterase, UA 01/13/2024 Small (A)  Negative Final    Urine Microalbumin 01/13/2024 17.9  <=30.0 ug/ml Final    Urine Creatinine 01/13/2024 127.5 (H)  45.0 - 106.0 mg/dL Final    Microalbumin Creatinine Ratio 01/13/2024 14.0  0.0 - 30.0 mg/gm Cr Final    TSH 01/13/2024 3.136  0.350 - 4.940 uIU/mL Final    Vit D 25 OH 01/13/2024 18.3 (L)  30.0 - 80.0 ng/mL Final    WBC 01/13/2024 5.64  4.50 - 11.50 x10(3)/mcL Final    RBC 01/13/2024 4.57  4.20 - 5.40 x10(6)/mcL Final    Hgb 01/13/2024 12.1  12.0 - 16.0 g/dL Final    Hct 01/13/2024 41.8  37.0 - 47.0 % Final    MCV 01/13/2024 91.5  80.0 - 94.0 fL Final    MCH 01/13/2024 26.5 (L)  27.0 - 31.0 pg Final    MCHC 01/13/2024 28.9 (L)  33.0 - 36.0 g/dL Final    RDW 01/13/2024 15.3  11.5 - 17.0 % Final    Platelet 01/13/2024 225  130 - 400 x10(3)/mcL Final    MPV  2024 10.1  7.4 - 10.4 fL Final    Neut % 2024 65.8  % Final    Lymph % 2024 25.4  % Final    Mono % 2024 5.5  % Final    Eos % 2024 2.7  % Final    Basophil % 2024 0.2  % Final    Lymph # 2024 1.43  0.6 - 4.6 x10(3)/mcL Final    Neut # 2024 3.72  2.1 - 9.2 x10(3)/mcL Final    Mono # 2024 0.31  0.1 - 1.3 x10(3)/mcL Final    Eos # 2024 0.15  0 - 0.9 x10(3)/mcL Final    Baso # 2024 0.01  <=0.2 x10(3)/mcL Final    IG# 2024 0.02  0 - 0.04 x10(3)/mcL Final    IG% 2024 0.4  % Final    Bacteria, UA 2024 Rare  None Seen, Rare, Occasional /HPF Final    RBC, UA 2024 None Seen  None Seen, 0-2, 3-5, 0-5 /HPF Final    WBC, UA 2024 11-20 (A)  None Seen, 0-2, 3-5, 0-5 /HPF Final    Squamous Epithelial Cells, UA 2024 Few (A)  None Seen, Rare, Occasional, Occ /HPF Final    Urine Culture 2024 Multiple organisms present indicate probable contamination. Recommend recollection.   Final    Urine Culture 2024 50,000-75,000 colonies/ml GAMMA STREPTOCOCCUS (A)   Final    Urine Culture 2024 50,000-75,000 colonies/ml Streptococcus agalactiae (Group B) (A)   Final    Urine Culture 2024 50,000-75,000 colonies/ml GRAM POSITIVE CAROLYNN RESEMBLING DIPHTHEROIDS   Final    Urine Culture 2024 10,000 - 25,000 colonies/ml Gram-negative Rods (A)   Final       Encounter Diagnosis   Name Primary?    COVID Yes        No orders of the defined types were placed in this encounter.     Medications Ordered This Encounter   Medications    nirmatrelvir-ritonavir (PAXLOVID) 300 mg (150 mg x 2)-100 mg copackaged tablets (EUA)     Sig: Take 3 tablets by mouth 2 (two) times daily. Each dose contains 2 nirmatrelvir (pink tablets) and 1 ritonavir (white tablet). Take all 3 tablets together     Dispense:  1 tablet     Refill:  0        No follow-ups on file.      E-Visit Time Trackin minutes

## 2024-01-26 NOTE — TELEPHONE ENCOUNTER
Pt states daughter in law was able to find a place online to get paxlovid for free so she was able to get it and has picked it up so no longer needs a steroid pack.

## 2024-01-26 NOTE — TELEPHONE ENCOUNTER
----- Message from FRANCIS Montesinos sent at 1/26/2024  3:20 PM CST -----  Paxlovid is the only approved medication for COVID.   I can send a Medrol Dose Pack (steroids) for symptoms but that will not treat COVID.     ----- Message -----  From: Lyndsey Barba LPN  Sent: 1/26/2024   3:17 PM CST  To: FRANCIS Montesinos      ----- Message -----  From: Winifred Stokes  Sent: 1/26/2024  12:11 PM CST  To: Yael FREDERICK Staff    .Type:  Needs Medical Advice    Who Called: pt daughter in law  Symptoms (please be specific):    How long has patient had these symptoms:    Pharmacy name and phone #:  [address]  Would the patient rather a call back or a response via [facility]? Call back   Best Call Back Number: [phone]  Additional Information: pt needs something cheaper for Covid, alendronate (FOSAMAX) 70 MG tablet,   ergocalciferol (ERGOCALCIFEROL) 50,000 unit Cap, nirmatrelvir-ritonavir (PAXLOVID) 300 mg (150 mg x 2)-100 mg copackaged tablets (EUA)

## 2024-02-09 ENCOUNTER — OFFICE VISIT (OUTPATIENT)
Dept: INTERNAL MEDICINE | Facility: CLINIC | Age: 69
End: 2024-02-09
Payer: MEDICARE

## 2024-02-09 DIAGNOSIS — F32.A MILD DEPRESSION: Chronic | ICD-10-CM

## 2024-02-09 DIAGNOSIS — G47.33 OSA ON CPAP: Chronic | ICD-10-CM

## 2024-02-09 DIAGNOSIS — E78.5 HYPERLIPIDEMIA LDL GOAL <100: Primary | Chronic | ICD-10-CM

## 2024-02-09 DIAGNOSIS — E66.01 CLASS 3 SEVERE OBESITY DUE TO EXCESS CALORIES WITH SERIOUS COMORBIDITY AND BODY MASS INDEX (BMI) OF 60.0 TO 69.9 IN ADULT: Chronic | ICD-10-CM

## 2024-02-09 DIAGNOSIS — Z12.31 ENCOUNTER FOR SCREENING MAMMOGRAM FOR MALIGNANT NEOPLASM OF BREAST: ICD-10-CM

## 2024-02-09 PROCEDURE — 99214 OFFICE O/P EST MOD 30 MIN: CPT | Mod: 95,,, | Performed by: NURSE PRACTITIONER

## 2024-02-09 RX ORDER — HYDROXYZINE HYDROCHLORIDE 25 MG/1
25 TABLET, FILM COATED ORAL 3 TIMES DAILY PRN
Qty: 90 TABLET | Refills: 3 | Status: SHIPPED | OUTPATIENT
Start: 2024-02-09

## 2024-02-09 RX ORDER — ALENDRONATE SODIUM 70 MG/1
70 TABLET ORAL WEEKLY
Qty: 12 TABLET | Refills: 2 | Status: SHIPPED | OUTPATIENT
Start: 2024-02-09

## 2024-02-09 RX ORDER — LORATADINE 10 MG/1
10 TABLET ORAL NIGHTLY
Qty: 90 TABLET | Refills: 2 | Status: SHIPPED | OUTPATIENT
Start: 2024-02-09

## 2024-02-09 RX ORDER — ROSUVASTATIN CALCIUM 10 MG/1
10 TABLET, COATED ORAL NIGHTLY
Qty: 90 TABLET | Refills: 2 | Status: SHIPPED | OUTPATIENT
Start: 2024-02-09

## 2024-02-09 RX ORDER — HYDROCORTISONE 25 MG/G
CREAM TOPICAL 2 TIMES DAILY
Qty: 20 G | Refills: 1 | Status: SHIPPED | OUTPATIENT
Start: 2024-02-09

## 2024-02-09 RX ORDER — ERGOCALCIFEROL 1.25 MG/1
50000 CAPSULE ORAL
Qty: 12 CAPSULE | Refills: 2 | Status: SHIPPED | OUTPATIENT
Start: 2024-02-09 | End: 2024-06-11 | Stop reason: SDUPTHER

## 2024-02-09 RX ORDER — ESOMEPRAZOLE MAGNESIUM 40 MG/1
40 CAPSULE, DELAYED RELEASE ORAL
Qty: 90 CAPSULE | Refills: 2 | Status: SHIPPED | OUTPATIENT
Start: 2024-02-09 | End: 2024-06-11 | Stop reason: SDUPTHER

## 2024-02-09 NOTE — PROGRESS NOTES
Patient ID: [id number]     Chief Complaint: Results and Follow-up (C/O itching all over. 2. Want a prescription for Contrave.)    HPI:     The patient location is: home  The chief complaint leading to consultation is: follow up    Visit type: audiovisual    Face to Face time with patient: 15 minutes  25 minutes of total time spent on the encounter, which includes face to face time and non-face to face time preparing to see the patient (eg, review of tests), Obtaining and/or reviewing separately obtained history, Documenting clinical information in the electronic or other health record, Independently interpreting results (not separately reported) and communicating results to the patient/family/caregiver, or Care coordination (not separately reported).     Each patient to whom he or she provides medical services by telemedicine is:  (1) informed of the relationship between the physician and patient and the respective role of any other health care provider with respect to management of the patient; and (2) notified that he or she may decline to receive medical services by telemedicine and may withdraw from such care at any time.      Eleanor Buckner is a 68 y.o. female with diagnosis of HLD, GERD, Osteoporosis, Hx of ORIF Right Distal Radius (07/14/2022), Chronic Low Back Pain, KI with CPAP, Obesity, Anxiety. Patient seen in clinic today for follow up on HLD. Patient last seen in clinic on 11/30/2023.   Patient states taking medications as prescribed, tolerating well. Patient currently prescribed Rosuvastatin 10 mg daily.   CT Chest ordered due to increased SOB, negative. Sleep Study completed on 09/12/2022; recommended CPAP. Patient states she is using it nightly. PFTs completed in 2020, normal.   Cardiology increased her Lasix to 20 mg daily prn edema. Repeat Echo completed on 12/06/2022, normal EF.  Patient seen by Dr. Musa for BLE edema.   Today, patient request medication for weight loss.      Patient  currently followed by Pain Management for back pain. Last appointment on 12/15/2023. Intervertebral disc disorders with radiculopathy, lumbar region. Chronic bilateral low back pain with bilateral sciatica. Morbid obesity. Excellent pain control bilateral low back and legs. Pt concerned w/ braeden leg weakness no DM or peripheral neuropathy. Rec f/u PCP for dietician consult. Pt would like to try weight loss before EMG/NCS to bilateral lower extremities. Goal weight loss 15 pounds by next office visit in 8 weeks + discuss healthy weight loss options. Mediterranean given to patient. Patient has follow up appointment scheduled for 02/09/2024.      Patient followed by Mental Health Provider, Eleanor Anderson NP. Last appointment on 11/01/2023. Depression - mild; Anxiety - moderate; Difficulty sleeping. Plan: Continue Zoloft 200mg q HS, Wellbutrin XL to 300mg q day, Trazodone 25-50mg as needed for insomnia, Buspar 5mg three times a day. FU in 3 months - virtual      Patient followed by Orthopedic Clinic. Last appointment on 02/23/2023.  Patient returns today for repeat evaluation of left knee.  Status post steroid injection on 01/26/2023.  She states the injection helped for about 3 days until she was walking and felt a pop in the back of her knee.  She denies any trauma.  She states she continues to have lot of pain about the posterior knee.  Denies any swelling at the time of the event.  Currently taking Tylenol as she can not take NSAIDs due to history of gastric issues.  She presents in a wheelchair today but states she normally uses a walker. Dx: Left Knee OA. Physical exam and previous imaging findings discussed with patient.  I believe she had a flare-up of her underlying arthritis had possibly ruptured a Baker's cyst.  Too soon for repeat injection today.  Patient is not a good surgical candidate due to elevated BMI.  We have discussed this today.  She will continue low-impact activities and Tylenol as tolerated.   I would like see the patient back in 2 months at this time. Patient has follow up appointment scheduled for 04/20/2023.      Patient followed by Cardiology Clinic. Last appointment on 09/08/2023. CANO - stable, EF - 55% per ECHO 12.6.22, EF 55% per Echo 11.2021, EF 55% per Echo 7.3.19. Lexiscan Stress Test 5.13.22 showed  a mild intensity, small sized, fixed perfusion abnormality consistent with scar in the apical wall(s) in the typical distribution of the LAD territory. Lexiscan stress test - negative for ischemia with no wall motion abnormalities (9.19.19). PFTs - normal. Counseled patient on weight loss and increased activity as tolerated. HLD: LDL at goal. Continue Crestor 10mg daily. Counseled on low-cholesterol, low-fat diet, and exercise as tolerated. Peripheral edema: RLE venous study on 10.3.22 showed no DVT and no substantial reflux study. LLE venous study on 9.29.22 showed possible non-occlusive deep vein thrombus but no substantial reflux. LINDY testing Nov. 2021 - No evidence of significant arterial insufficiency was identified on the study. Continue Lasix to 20mg PO Qd prn and compression pumps. Follow up with [facility], Dr. Hannah mcneil. Morbid obesity: Counseled patient on weight loss, heart healthy diet, and increased activity as tolerated. KI: Compliant with home CPAP nightlyPatient has follow up appointment scheduled 03/08/2024.     Review of patient's allergies indicates:   Allergen Reactions    Nsaids (non-steroidal anti-inflammatory drug) Nausea And Vomiting     Breast Cancer Screening: MMG negative on 09/12/2022, ordered 02/09/2024  Cervical Cancer Screening: deferred due to age  Colorectal Cancer Screening: Colonoscopy on 01/13/2020 with recommended repeat in 10 years  Diabetic Eye Exam: N/A  Diabetic Foot Exam: N/A  Lung Cancer Screening: N/A  Prostate Cancer Screening: N/A  AAA Screening: N/A  Osteoporosis Screening: Osteopenia on 05/24/2023   Medicare Wellness: ordered  Immunizations:   Immunization  History   Administered Date(s) Administered    COVID-19, MRNA, LN-S, PF (MODERNA FULL 0.5 ML DOSE) 03/16/2021, 04/26/2021    Pneumococcal Conjugate - 13 Valent 09/17/2019     Past Surgical History:   Procedure Laterality Date    APPENDECTOMY      CHOLECYSTECTOMY      COLONOSCOPY  01/13/2020    ESOPHAGOGASTRODUODENOSCOPY  06/10/2016    INJECTION OF ANESTHETIC AGENT AROUND MEDIAL BRANCH NERVES INNERVATING LUMBAR FACET JOINT Right 02/22/2023    Procedure: Block-nerve-medial branch-lumbar;  Surgeon: Cassi Muñoz MD;  Location: [facility] OR;  Service: Pain Management;  Laterality: Right;  L4/L5 and L5/S1    JOINT REPLACEMENT Right 03-    total shoulder    OPEN REDUCTION AND INTERNAL FIXATION (ORIF) OF FRACTURE OF DISTAL RADIUS Right 07/14/2022    Procedure: ORIF, FRACTURE, RADIUS, DISTAL;  Surgeon: Emery Morel MD;  Location[facility];  Service: Orthopedics;  Laterality: Right;    PROCTOSCOPY  03/23/2016    TRANSFORAMINAL EPIDURAL INJECTION OF STEROID Right 4/5/2023    Procedure: Injection,steroid,epidural,transforaminal approach;  Surgeon: Cassi Muñoz MD;  Location: [facility] OR;  Service: Pain Management;  Laterality: Right;  TFESI on Right at L3/L4    TRANSFORAMINAL EPIDURAL INJECTION OF STEROID Bilateral 5/17/2023    Procedure: Injection,steroid,epidural,transforaminal approach;  Surgeon: Cassi Muñoz MD;  Location: [facility] OR;  Service: Pain Management;  Laterality: Bilateral;  Transforaminal Epidural Steroid Injection  Bilateral L4    TRANSFORAMINAL EPIDURAL INJECTION OF STEROID Bilateral 11/15/2023    Procedure: Injection,steroid,epidural,transforaminal approach;  Surgeon: Cassi Muñoz MD;  Location: [facility] OR;  Service: Pain Management;  Laterality: Bilateral;  Bilateral TFESI L4     family history includes Arthritis in her father; Gout in her brother; Heart disease in her mother; Heart failure in her mother; Osteoporosis in her brother and sister.    Social History     Socioeconomic History     Marital status:    Tobacco Use    Smoking status: Never    Smokeless tobacco: Never    Tobacco comments:     None   Substance and Sexual Activity    Alcohol use: Never    Drug use: Never    Sexual activity: Yes     Partners: Male     Birth control/protection: Partner-Vasectomy     Social Determinants of Health     Financial Resource Strain: Medium Risk (1/10/2024)    Overall Financial Resource Strain (CARDIA)     Difficulty of Paying Living Expenses: Somewhat hard   Food Insecurity: Food Insecurity Present (1/10/2024)    Hunger Vital Sign     Worried About Running Out of Food in the Last Year: Sometimes true     Ran Out of Food in the Last Year: Sometimes true   Transportation Needs: No Transportation Needs (1/10/2024)    PRAPARE - Transportation     Lack of Transportation (Medical): No     Lack of Transportation (Non-Medical): No   Physical Activity: Unknown (1/10/2024)    Exercise Vital Sign     Days of Exercise per Week: 0 days   Stress: Stress Concern Present (1/10/2024)    Citizen of [place] [employer] - Occupational Stress Questionnaire     Feeling of Stress : Very much   Social Connections: Unknown (1/10/2024)    Social Connection and Isolation Panel [NHANES]     Frequency of Communication with Friends and Family: More than three times a week     Frequency of Social Gatherings with Friends and Family: Once a week     Active Member of Clubs or Organizations: No     Attends Club or Organization Meetings: Never     Marital Status:    Housing Stability: Low Risk  (1/10/2024)    Housing Stability Vital Sign     Unable to Pay for Housing in the Last Year: No     Number of Places Lived in the Last Year: 0     Unstable Housing in the Last Year: No     Current Outpatient Medications   Medication Instructions    acetaminophen (TYLENOL) 1,000 mg, Oral, Nightly    albuterol (PROVENTIL/VENTOLIN HFA) 90 mcg/actuation inhaler Inhalation, Every 4 hours PRN    alendronate (FOSAMAX) 70 mg, Oral, Weekly     amLODIPine (NORVASC) 2.5 mg, Oral, 2 times daily    baclofen (LIORESAL) 10 mg, Oral, 3 times daily    budesonide (RINOCORT AQUA) 32 mcg/actuation nasal spray 1 spray, Nasal, As needed (PRN)    buPROPion (WELLBUTRIN XL) 300 mg, Oral, Daily    busPIRone (BUSPAR) 5 mg, Oral, 3 times daily    docusate sodium 100 mg, Oral, Daily PRN    ergocalciferol (ERGOCALCIFEROL) 50,000 Units, Oral, Every 7 days    esomeprazole (NEXIUM) 40 mg, Oral, Before breakfast    fluticasone propionate (FLONASE) 50 mcg/actuation nasal spray 2 sprays, Each Nostril, As needed (PRN)    folic acid (FOLVITE) 1 mg, Oral, Daily    furosemide (LASIX) 20 mg, Oral, Daily PRN    hydrocortisone 2.5 % cream Topical (Top), 2 times daily    hydrOXYzine HCL (ATARAX) 25 mg, Oral, 3 times daily PRN    loratadine (CLARITIN) 10 mg, Oral, Nightly    potassium chloride (KLOR-CON) 10 MEQ TbSR 10 mEq, Oral, Daily PRN    pramipexole (MIRAPEX) 0.5 mg, Oral, Nightly    ranolazine (RANEXA) 500 mg, Oral, 2 times daily    ranolazine (RANEXA) 500 mg, Oral, 2 times daily    rosuvastatin (CRESTOR) 10 mg, Oral, Nightly    sertraline (ZOLOFT) 200 mg, Oral, Nightly    traZODone (DESYREL) 25 mg, Oral, Nightly    VENTOLIN HFA 90 mcg/actuation inhaler 2 puffs, Inhalation, Every 6 hours PRN       Subjective:     Review of Systems   Constitutional:  Negative for activity change and unexpected weight change.   HENT:  Negative for hearing loss, rhinorrhea and trouble swallowing.    Eyes:  Negative for discharge and visual disturbance.   Respiratory:  Negative for chest tightness and wheezing.    Cardiovascular:  Negative for chest pain and palpitations.   Gastrointestinal:  Negative for blood in stool, constipation, diarrhea and vomiting.   Endocrine: Negative for polydipsia and polyuria.   Genitourinary:  Negative for difficulty urinating, dysuria, hematuria and menstrual problem.   Musculoskeletal:  Positive for joint swelling. Negative for arthralgias and neck pain.    Neurological:  Negative for weakness and headaches.   Psychiatric/Behavioral:  Positive for dysphoric mood. Negative for confusion.        Objective:     There were no vitals taken for this visit.    Physical Exam  Neurological:      Mental Status: She is alert and oriented to person, place, and time.   Psychiatric:         Mood and Affect: Mood normal.       Labs Reviewed:     Hematology:  Lab Results   Component Value Date    WBC 5.64 01/13/2024    HGB 12.1 01/13/2024    HCT 41.8 01/13/2024     01/13/2024     Chemistry:  Lab Results   Component Value Date     01/13/2024    K 4.4 01/13/2024    CHLORIDE 103 01/13/2024    BUN 16.5 01/13/2024    CREATININE 0.85 01/13/2024    EGFRNORACEVR >60 01/13/2024    GLUCOSE 118 (H) 01/13/2024    CALCIUM 9.5 01/13/2024    ALKPHOS 98 01/13/2024    LABPROT 7.8 (H) 01/13/2024    ALBUMIN 3.7 01/13/2024    BILIDIR 0.2 11/04/2021    IBILI 0.30 11/04/2021    AST 19 01/13/2024    ALT 21 01/13/2024    [id number] 18.3 (L) 01/13/2024     Lab Results   Component Value Date    HGBA1C 5.6 01/13/2024     Lipid Panel:  Lab Results   Component Value Date    CHOL 251 (H) 01/13/2024    HDL 61 (H) 01/13/2024    .00 (H) 01/13/2024    TRIG 139 01/13/2024    TOTALCHOLEST 4 01/13/2024     Thyroid:  Lab Results   Component Value Date    TSH 3.136 01/13/2024    T3FREE 2.29 03/11/2020     Urine:  Lab Results   Component Value Date    COLORUA Yellow 01/13/2024    APPEARANCEUA Slightly Cloudy (A) 01/13/2024    SGUA 1.025 01/13/2024    PHUA 5.5 01/13/2024    PROTEINUA Negative 01/13/2024    GLUCOSEUA Negative 01/13/2024    KETONESUA Negative 01/13/2024    BLOODUA Negative 01/13/2024    NITRITESUA Negative 01/13/2024    LEUKOCYTESUR Small (A) 01/13/2024    RBCUA None Seen 01/13/2024    WBCUA 11-20 (A) 01/13/2024    BACTERIA Rare 01/13/2024    CREATRANDUR 127.5 (H) 01/13/2024        Assessment:       ICD-10-CM ICD-9-CM   1. Hyperlipidemia LDL goal <100  E78.5 272.4   2. KI on CPAP   G47.33 327.23   3. Class 3 severe obesity due to excess calories with serious comorbidity and body mass index (BMI) of 60.0 to 69.9 in adult  E66.01 278.01    Z68.44 V85.44   4. Mild depression  F32.A 311   5. Encounter for screening mammogram for malignant neoplasm of breast  Z12.31 V76.12        Plan:     1. Hyperlipidemia LDL goal <100  Continue Rosuvastatin 10 mg daily  Weight loss encouraged  Low fat/high fiber diet  Increase physical activity  Cholesterol Total   Date Value Ref Range Status   01/13/2024 251 (H) <=200 mg/dL Final     HDL Cholesterol   Date Value Ref Range Status   01/13/2024 61 (H) 35 - 60 mg/dL Final     Triglyceride   Date Value Ref Range Status   01/13/2024 139 37 - 140 mg/dL Final     LDL Cholesterol   Date Value Ref Range Status   01/13/2024 162.00 (H) 50.00 - 140.00 mg/dL Final   - Comprehensive Metabolic Panel; Future  - Lipid Panel; Future    2. KI on CPAP  Continue using CPAP nightly    3. Class 3 severe obesity due to excess calories with serious comorbidity and body mass index (BMI) of 60.0 to 69.9 in adult  There is no height or weight on file to calculate BMI.  TSH   Date Value Ref Range Status   01/13/2024 3.136 0.350 - 4.940 uIU/mL Final     Vit D 25 OH   Date Value Ref Range Status   01/13/2024 18.3 (L) 30.0 - 80.0 ng/mL Final     Hemoglobin A1c   Date Value Ref Range Status   01/13/2024 5.6 <=7.0 % Final   Sleep Study: KI with CPAP  Weight Loss Encouraged  Increase Physical Activity  - Ambulatory referral/consult to Bariatric/Obesity Medicine; Future    4. Mild depression  Followed by Mental Health Provider  Continue Zoloft, Wellbutrin, Buspar    5. Encounter for screening mammogram for malignant neoplasm of breast  - Mammo Digital Screening Bilat w/ Jose; Future      Follow up in about 4 months (around 6/9/2024) for Labs. In addition to their scheduled follow up, the patient has also been instructed to follow up on as needed basis.     Patience Conley, FRANCIS

## 2024-02-28 ENCOUNTER — OFFICE VISIT (OUTPATIENT)
Dept: ORTHOPEDICS | Facility: CLINIC | Age: 69
End: 2024-02-28
Payer: MEDICARE

## 2024-02-28 VITALS — WEIGHT: 293 LBS | HEIGHT: 61 IN | BODY MASS INDEX: 55.32 KG/M2

## 2024-02-28 DIAGNOSIS — M17.11 PRIMARY OSTEOARTHRITIS OF RIGHT KNEE: Primary | ICD-10-CM

## 2024-02-28 DIAGNOSIS — M25.561 RIGHT KNEE PAIN, UNSPECIFIED CHRONICITY: ICD-10-CM

## 2024-02-28 PROCEDURE — 20610 DRAIN/INJ JOINT/BURSA W/O US: CPT | Mod: RT,,,

## 2024-02-28 PROCEDURE — 99214 OFFICE O/P EST MOD 30 MIN: CPT | Mod: 25,,,

## 2024-02-28 RX ORDER — LIDOCAINE HYDROCHLORIDE 20 MG/ML
3 INJECTION, SOLUTION INFILTRATION; PERINEURAL
Status: DISCONTINUED | OUTPATIENT
Start: 2024-02-28 | End: 2024-02-28 | Stop reason: HOSPADM

## 2024-02-28 RX ORDER — METHYLPREDNISOLONE ACETATE 80 MG/ML
80 INJECTION, SUSPENSION INTRA-ARTICULAR; INTRALESIONAL; INTRAMUSCULAR; SOFT TISSUE
Status: DISCONTINUED | OUTPATIENT
Start: 2024-02-28 | End: 2024-02-28 | Stop reason: HOSPADM

## 2024-02-28 RX ADMIN — LIDOCAINE HYDROCHLORIDE 3 MG: 20 INJECTION, SOLUTION INFILTRATION; PERINEURAL at 01:02

## 2024-02-28 RX ADMIN — METHYLPREDNISOLONE ACETATE 80 MG: 80 INJECTION, SUSPENSION INTRA-ARTICULAR; INTRALESIONAL; INTRAMUSCULAR; SOFT TISSUE at 01:02

## 2024-02-28 NOTE — PROGRESS NOTES
Subjective:    CC: Pain of the Right Knee (R knee pain - pt states that her grandson passed between her and her walker causing her leg to twist. She would like an injection today. )       HPI:  Patient presents to clinic for evaluation of right knee.  Patient states she started having pain last week after she twisted her knee.  She denies any falls.  She states her pain is overall improving since last week but continues to worsen with long standing, walking or getting up from a seated position.  She uses a Rollator.  Currently taking Tylenol when needed as she can not take NSAIDs due to gastric issues.  Patient was not able to get on GLP 1 therapy will work on weight loss but has an appointment upcoming to start this.  She denies doing any kind of exercises for her legs.  She has had previous success with a left knee steroid injection would like to try a right knee today.    ROS: Refer to HPI for pertinent ROS. All other 12 point systems negative.    Objective:    There were no vitals filed for this visit.     Physical Exam:  The patient is well-nourished, well-developed and in no apparent distress, pleasant and cooperative. Examination of the right lower extremity compartments are soft and warm. Skin is intact. There are no signs or symptoms of DVT or infection. There is no joint effusion. There is no erythema. Tender to palpation along the anteromedial joint line ,right knee range of motion is 0-90 degrees. The knee is stable to exam with varus and valgus stressing. Negative anterior and posterior drawer. Negative Lachman´s.  Negative Carlos's test. Patella grind is positive, Negative for apprehension. Neurovascularly intact distally.    Images:  X-rays:  Three views of the right knee demonstrate tricompartmental osteoarthritis.  Kg grade 4.  No obvious fracture dislocation.  Images Reviewed and discussed with patient.    Assessment:  1. Right knee pain, unspecified chronicity  - X-Ray Knee 3 View Right;  Future    2. Primary osteoarthritis of right knee  - Large Joint Aspiration/Injection: R knee  - LIDOcaine HCL 20 mg/ml (2%) injection 3 mg  - methylPREDNISolone acetate injection 80 mg       Plan:  Physical exam and imaging findings discussed with patient.  She tolerated a right knee steroid injection well today in clinic.  She was instructed to watch her blood sugars closely.  We have discussed prescription anti-inflammatories however due to her gastric history we will hold off and she will continue to utilize Tylenol and topicals.  We have again discussed the importance of blowing her BMI.  We have discussed lifestyle and diet modifications.  Patient will call when she feels she needs another injection.    Follow up: Follow up if symptoms worsen or fail to improve.    Large Joint Aspiration/Injection: R knee    Date/Time: 2/28/2024 1:00 PM    Performed by: Angie Tran PA-C  Authorized by: Angie Tran PA-C    Consent Done?:  Yes (Verbal)  Indications:  Pain  Site marked: the procedure site was marked    Prep: patient was prepped and draped in usual sterile fashion    Approach:  Anterolateral  Location:  Knee  Site:  R knee  Medications:  3 mg LIDOcaine HCL 20 mg/ml (2%) 20 mg/mL (2 %); 80 mg methylPREDNISolone acetate 80 mg/mL  Patient tolerance:  Patient tolerated the procedure well with no immediate complications

## 2024-02-28 NOTE — PROCEDURES
Large Joint Aspiration/Injection: R knee    Date/Time: 2/28/2024 1:00 PM    Performed by: Angie Tran PA-C  Authorized by: Angie Tran PA-C    Consent Done?:  Yes (Verbal)  Indications:  Pain  Site marked: the procedure site was marked    Prep: patient was prepped and draped in usual sterile fashion    Approach:  Anterolateral  Location:  Knee  Site:  R knee  Medications:  3 mg LIDOcaine HCL 20 mg/ml (2%) 20 mg/mL (2 %); 80 mg methylPREDNISolone acetate 80 mg/mL  Patient tolerance:  Patient tolerated the procedure well with no immediate complications

## 2024-03-12 ENCOUNTER — HOSPITAL ENCOUNTER (OUTPATIENT)
Dept: RADIOLOGY | Facility: HOSPITAL | Age: 69
Discharge: HOME | End: 2024-03-12
Attending: NURSE PRACTITIONER
Payer: MEDICARE

## 2024-03-12 DIAGNOSIS — Z12.31 ENCOUNTER FOR SCREENING MAMMOGRAM FOR MALIGNANT NEOPLASM OF BREAST: ICD-10-CM

## 2024-04-19 DIAGNOSIS — F41.9 ANXIETY: Chronic | ICD-10-CM

## 2024-04-19 DIAGNOSIS — F32.A MILD DEPRESSION: Chronic | ICD-10-CM

## 2024-04-22 RX ORDER — BUPROPION HYDROCHLORIDE 300 MG/1
300 TABLET ORAL DAILY
Qty: 90 TABLET | Refills: 1 | Status: SHIPPED | OUTPATIENT
Start: 2024-04-22 | End: 2024-06-04

## 2024-04-22 NOTE — TELEPHONE ENCOUNTER
Spoke to patient, Informed her that I had received her refill request for Wellbutrin  mg but her last appointment with Ms. Webster was 11/1/23 so she needed to make an appointment. Next scheduled appt is 6/4/24 @ 8:00 am. Patient verbalized understanding.

## 2024-05-07 DIAGNOSIS — G25.81 RESTLESS LEG SYNDROME: Primary | ICD-10-CM

## 2024-05-07 NOTE — TELEPHONE ENCOUNTER
----- Message from Abbie Ruiz sent at 5/7/2024  8:37 AM CDT -----  Regarding: advice  .Type:  Needs Medical Advice    Who Called: pt    Symptoms (please be specific):      How long has patient had these symptoms:      Pharmacy name and phone #:  [id number] - [address]  AT [facility] [address]   Phone: [phone]  Fax: [phone]        Would the patient rather a call back or a response via [facility]?     Best Call Back Number: [phone];     Additional Information: pt is requesting that a medication be sent into the pharmacy for restless leg; pt does not remember the name of the medication, but it should be listed in chart; thanks.

## 2024-05-09 RX ORDER — PRAMIPEXOLE DIHYDROCHLORIDE 0.5 MG/1
0.5 TABLET ORAL NIGHTLY
Qty: 90 TABLET | Refills: 2 | OUTPATIENT
Start: 2024-05-09

## 2024-05-10 ENCOUNTER — TELEPHONE (OUTPATIENT)
Dept: INTERNAL MEDICINE | Facility: CLINIC | Age: 69
End: 2024-05-10
Payer: MEDICARE

## 2024-05-10 RX ORDER — PRAMIPEXOLE DIHYDROCHLORIDE 0.5 MG/1
0.5 TABLET ORAL NIGHTLY
Qty: 90 TABLET | Refills: 1 | Status: SHIPPED | OUTPATIENT
Start: 2024-05-10 | End: 2024-06-11 | Stop reason: SDUPTHER

## 2024-05-10 NOTE — TELEPHONE ENCOUNTER
Pt notified refill was sent. Pt verbalized understanding and will call with any questions or concerns.

## 2024-05-10 NOTE — TELEPHONE ENCOUNTER
----- Message from FRANCIS Montesinos sent at 5/10/2024 11:19 AM CDT -----  Refilled  ----- Message -----  From: Lyndsey Barba LPN  Sent: 5/9/2024   9:14 AM CDT  To: FRANCIS Montesinos    Refill request was sent, should be in your inbox.  ----- Message -----  From: Leatha Guevara  Sent: 5/8/2024  11:07 AM CDT  To: Violet FREDERICK Staff    Who Called: Eleanor Buckner    Refill or New Rx:Refill  RX Name and Strength:pramipexole (MIRAPEX) 0.5 MG tablet 90 tablet  How is the patient currently taking it? (ex. 1XDay): one  Is this a 30 day or 90 day RX: 90  Local or Mail Order: local  List of preferred pharmacies on file (remove unneeded): [unfilled]  If different Pharmacy is requested, enter Pharmacy information here including location and phone number:  [facility]   Ordering Provider: violet      Preferred Method of Contact: Phone Call  Patient's Preferred Phone Number on File: [phone]   Best Call Back Number, if different:  Additional Information:  pt stated she has been requesting a refill on medication since 5/3/24,  please advise, thanks

## 2024-05-14 ENCOUNTER — TELEPHONE (OUTPATIENT)
Dept: FAMILY MEDICINE | Facility: CLINIC | Age: 69
End: 2024-05-14
Payer: MEDICARE

## 2024-05-15 DIAGNOSIS — F32.A MILD DEPRESSION: Chronic | ICD-10-CM

## 2024-05-15 DIAGNOSIS — F41.9 ANXIETY: Chronic | ICD-10-CM

## 2024-05-15 RX ORDER — SERTRALINE HYDROCHLORIDE 100 MG/1
200 TABLET, FILM COATED ORAL NIGHTLY
Qty: 180 TABLET | Refills: 1 | Status: SHIPPED | OUTPATIENT
Start: 2024-05-15 | End: 2024-06-04 | Stop reason: SDUPTHER

## 2024-05-16 ENCOUNTER — HOSPITAL ENCOUNTER (OUTPATIENT)
Dept: RADIOLOGY | Facility: HOSPITAL | Age: 69
Discharge: HOME | End: 2024-05-16
Attending: NURSE PRACTITIONER
Payer: MEDICARE

## 2024-05-16 DIAGNOSIS — N63.20 LUMP OF LEFT BREAST: ICD-10-CM

## 2024-05-16 DIAGNOSIS — N63.25 UNSPECIFIED LUMP IN THE LEFT BREAST, OVERLAPPING QUADRANTS: ICD-10-CM

## 2024-05-16 PROCEDURE — 77066 DX MAMMO INCL CAD BI: CPT | Mod: TC

## 2024-05-16 PROCEDURE — 76642 ULTRASOUND BREAST LIMITED: CPT | Mod: 26,LT,, | Performed by: RADIOLOGY

## 2024-05-16 PROCEDURE — 76642 ULTRASOUND BREAST LIMITED: CPT | Mod: TC,LT

## 2024-05-16 PROCEDURE — 77062 BREAST TOMOSYNTHESIS BI: CPT | Mod: 26,,, | Performed by: RADIOLOGY

## 2024-05-16 PROCEDURE — 77066 DX MAMMO INCL CAD BI: CPT | Mod: 26,,, | Performed by: RADIOLOGY

## 2024-06-04 ENCOUNTER — OFFICE VISIT (OUTPATIENT)
Dept: BEHAVIORAL HEALTH | Facility: CLINIC | Age: 69
End: 2024-06-04
Payer: MEDICARE

## 2024-06-04 ENCOUNTER — TELEPHONE (OUTPATIENT)
Dept: BEHAVIORAL HEALTH | Facility: CLINIC | Age: 69
End: 2024-06-04
Payer: MEDICARE

## 2024-06-04 DIAGNOSIS — G47.9 DIFFICULTY SLEEPING: Chronic | ICD-10-CM

## 2024-06-04 DIAGNOSIS — F32.A MILD DEPRESSION: Chronic | ICD-10-CM

## 2024-06-04 DIAGNOSIS — F41.9 ANXIETY: Chronic | ICD-10-CM

## 2024-06-04 PROCEDURE — 99212 OFFICE O/P EST SF 10 MIN: CPT | Mod: S$PBB,NDTC,, | Performed by: NURSE PRACTITIONER

## 2024-06-04 RX ORDER — BUPROPION HYDROCHLORIDE 450 MG/1
300 TABLET, FILM COATED, EXTENDED RELEASE ORAL DAILY
Qty: 30 TABLET | Refills: 3 | Status: SHIPPED | OUTPATIENT
Start: 2024-06-04 | End: 2024-06-04

## 2024-06-04 RX ORDER — SERTRALINE HYDROCHLORIDE 100 MG/1
200 TABLET, FILM COATED ORAL NIGHTLY
Qty: 180 TABLET | Refills: 1 | Status: SHIPPED | OUTPATIENT
Start: 2024-06-04

## 2024-06-04 RX ORDER — BUPROPION HYDROCHLORIDE 450 MG/1
TABLET, FILM COATED, EXTENDED RELEASE ORAL
Qty: 30 TABLET | Refills: 3 | Status: SHIPPED | OUTPATIENT
Start: 2024-06-04 | End: 2024-06-19

## 2024-06-04 RX ORDER — TRAZODONE HYDROCHLORIDE 50 MG/1
25 TABLET ORAL NIGHTLY
Qty: 45 TABLET | Refills: 1 | Status: SHIPPED | OUTPATIENT
Start: 2024-06-04

## 2024-06-04 NOTE — PROGRESS NOTES
Follow-up #5  06/04/2024  HPI: 70yo WFreferred by PCP to the [facility] for depression and anxiety  PMHx: HLD, GERD, Osteoporosis, Anxiety.    On her last visit, patient was doing well.     Today, patient states that she is doing alright.  Patient states that she is more anxious and is not taking the Buspar.   She states that she wants to do things but it takes all she has to do it (a lack of energy).   She states that when she is invited to go out to see her grandchildren, she does not have the energy to go. She states that she pushes herself to go.   Patient is 70yo and 376lbs. She has to use a walker to ambulate. Her age and her weight are significant factors and there may not be a medication that can give her the energy that she is looking for.   But she also reporting anxiety. She states that there are so many things that she wants to do and cannot do them. When asked if she was more depressed or anxious, she states that she is more depressed. But again, her age and her weight cannot be treated with medication.     Follow-up in 8 weeks - virtual      PHQ score:  06/04/2024: virtual  11/01/2023: virtual  09/19/2023: 10 moderate  03/17/2023: 7 mild  01/30/2023: 8? - mild to moderate  07/27/2022: 16 - moderately severe    NELY-7:  06/04/2024: virtual  11/01/2023: virtual  09/19/2023: 13 moderate  03/17/2023: 13 moderate  01/30/2023: 6  mild  07/27/2022: 14 - moderate anxiety    Mental Status Evaluation:  Appearance:  appropriate   Behavior:  normal, cooperative   Speech:  no latency; no press   Mood:  euthymic   Affect:  mood congruent   Thought Process:  normal and logical   Thought Content:  normal, no suicidality, no homicidality, delusions, or paranoia   Sensorium:  grossly intact   Cognition:  grossly intact   Insight:  intact   Judgment:  behavior is adequate to circumstances      Impression:  1. Depression - mild  2. Anxiety - moderate  3. Difficulty sleeping    Plan:  1. Continue Zoloft 200mg q HS  2.  Increase Wellbutrin XL to 450mg q day  3. Continue Trazodone 25-50mg as needed for insomnia  4. Continue Buspar 5mg three times a day  5. FU in 8 weeks - virtual      Follow-up #4  11/01/2023  HPI: 69yo WFreferred by PCP to the [facility] for depression and anxiety  PMHx: HLD, GERD, Osteoporosis, Anxiety.    On her last visit, Buspar 5mg three times a day was added to the Zoloft and Wellbutrin.     Today, patient stats that she is doing alright.  She states that she does not notice any great difference. She is taking the Buspar twice a day. Encouraged to take three times a day if needed.   She states that she is not too anxious.     She states that she is sleeping good at night and sleeps some during the day. She is cutting back on the dose of Trazodone.    Follow-up in 3 months - virtual      PHQ score:  11/01/2023: virtual  09/19/2023: 10 moderate  03/17/2023: 7 mild  01/30/2023: 8? - mild to moderate  07/27/2022: 16 - moderately severe    NELY-7:  11/01/2023: virtual  09/19/2023: 13 moderate  03/17/2023: 13 moderate  01/30/2023: 6  mild  07/27/2022: 14 - moderate anxiety    Mental Status Evaluation:  Appearance:  appropriate   Behavior:  normal, cooperative   Speech:  no latency; no press   Mood:  euthymic   Affect:  mood congruent   Thought Process:  normal and logical   Thought Content:  normal, no suicidality, no homicidality, delusions, or paranoia   Sensorium:  grossly intact   Cognition:  grossly intact   Insight:  intact   Judgment:  behavior is adequate to circumstances        Impression:  1. Depression - mild  2. Anxiety - moderate  3. Difficulty sleeping    Plan:  1. Continue Zoloft 200mg q HS  2. Continue Wellbutrin XL to 300mg q day  3. Continue Trazodone 25-50mg as needed for insomnia  4. Continue Buspar 5mg three times a day  5. FU in 3 months - virtual      Follow-up #3  09/19/2023/2023  HPI: 69yo WFreferred by PCP to the [facility] for depression and anxiety  PMHx: HLD, GERD,  Osteoporosis, Anxiety.    Patient did not make her 6 week follow-up after her last visit in March. At that time, the Wellbutrin XL was increased to 300mg a day.    Today she states that she is taking the Zoloft 200mg and the Wellbutrin XL 300mg and that   She states that she is still tired; does not have any energy.   She is still having back pain. She has taken two shots and she has to take one more. She stated that there has been some pain relief when she takes the shots.   She states that she is unable to stand for 5 minutes without getting tired and having increased back pain.     She states that with the combination of the Trazodone and her CPAP, she is sleeping better. She states that she is resting better but does not have any more energy. Once she gets up, she naps in her chair.     Will continue Zoloft and Wellbutrin XL.  Will add Buspar 5mg TID.  FU in 6 weeks  - virtual    Patient then states that Dr. Conway had given her something to lose weight and that she is taking it: Contrave. She had some left over from her original prescription. She thinks that she has lost about 10lbs.   She states that she wanted to see if it would help her to lose weight.   She states that she stopped taking it after it was prescribed by Dr. Conway because she did not think that it was working.   She has not talked to anyone about weight loss surgery.   She will FU with her PCP later this month.       PHQ score:  09/19/2023: 10 moderate  03/17/2023: 7 mild  01/30/2023: 8? - mild to moderate  07/27/2022: 16 - moderately severe    NELY-7:  09/19/2023: 13 moderate  03/17/2023: 13 moderate  01/30/2023: 6  mild  07/27/2022: 14 - moderate anxiety    Mental Status Evaluation:  Appearance:  Not assessed; telephone visit   Behavior:  normal, cooperative   Speech:  no latency; no press   Mood:  euthymic   Affect:  Not assessed; telephone visit   Thought Process:  normal and logical   Thought Content:  normal, no suicidality, no  homicidality, delusions, or paranoia   Sensorium:  grossly intact   Cognition:  grossly intact   Insight:  intact   Judgment:  behavior is adequate to circumstances        Impression:  1. Depression - mild  2. Anxiety - moderate  3. Difficulty sleeping    Plan:  1. Continue Zoloft 200mg q HS  2. Continue Wellbutrin XL to 300mg q day  3. Continue Trazodone 25-50mg as needed for insomnia  4. Add Buspar 5mg three times a day  5. FU in 6 weeks - virtual      Follow-up #2 03/17/2023  HPI: 68yo WFreferred by PCP to the [facility] and anxiety  PMHx: HLD, GERD, Osteoporosis, Anxiety.    On her last visit, patient had been using a CPAP for about 3 months and was having difficulty getting used to it. She was sleeping about 6 hours but was still not sleeping well. Stated that she feels that she might feel better if she could sleep better. Trazodone 50mg was added for insomnia.   She was taking Zoloft 200mg q HS and Wellbutrin XL 150mg q day.   Stated that she was still anxious because she gets short of breath while trying to walk; feared that she would fall again.     Today, patient states that she is doing alright.  She states that she is taking Trazodone 25 and is sleeping better.   She is still taking the Zoloft 200mg at HS and Wellbutrin XL 150mg.     She states that she is still tired; has no energy. She would like to try an increase in the Wellbutrin XL. She finds that the Wellbutrin helps to decrease her anxiety. Discussed with patient the an increase in the Wellbutrin may not give her more energy but she would like to try.  Will increase to 300mg q day.     FU in 6 weeks. Patient requests audio call as she has great difficulty getting around.     PHQ score:  03/17/2023: 7 mild  01/30/2023: 8? - mild to moderate  07/27/2022: 16 - moderately severe    NELY-7:  03/17/2023: 13 moderate  01/30/2023: 6  mild  07/27/2022: 14 - moderate anxiety    Mental Status Evaluation:  Appearance:  Not assessed;  telephone visit   Behavior:  normal, cooperative   Speech:  no latency; no press   Mood:  euthymic   Affect:  Not assessed; telephone visit   Thought Process:  normal and logical   Thought Content:  normal, no suicidality, no homicidality, delusions, or paranoia   Sensorium:  grossly intact   Cognition:  grossly intact   Insight:  intact   Judgment:  behavior is adequate to circumstances        Impression:  1. Depression - mild  2. Anxiety - moderate  3. Difficulty sleeping    Plan:  1. Continue Zoloft 200mg q HS  2. Increase Wellbutrin XL to 300mg q day  3. Continue Trazodone 25-50mg as needed for insomnia  4. FU in 6 weeks    Follow-up #1  01/30/2023  HPI: 68yo WFreferred by PCP to the [facility] for depression and anxiety  PMHx: HLD, GERD, Osteoporosis, Anxiety.    On her initial visit, she was to continue the Zoloft 200mg q HS and Wellbutrin XL 150mg q day was added.  She did not make her FU appointment.    Today, She states that she feels that the Wellbutrin has been helpful.  She had a sleep study and now has a CPAP.   She states that she is having trouble getting used to the CPAP. She has had it for about 3 months.   When she turns, the CPAP will make a noise and she wakes up and cannot go back to sleep.   She takes two 10mg Melatonin at night.  Believes that she is getting about 6 hours of sleep a night: 3 hours in her bed with the CPA and then 3 hours in her recliner without the CPAP.     She feels tired during the day.   States that if she could get sleep, she would feel better.     She is taking the Zoloft 200mg q HS and the Wellbutrin XL 150mg q day.   States that she is still anxious because she gets short of breath while trying to walk. She fears that she will fall again.     Will add Trazodone 50mg q HS.     PHQ score:  01/30/2023: 8? - mild to moderate  07/27/2022: 16 - moderately severe    NELY-7:  01/30/2023: 6  mild  07/27/2022: 14 - moderate anxiety    Mental Status Evaluation:  Appearance:   casually dressed, neatly groomed, obese, seated in wheelchair   Behavior:  cooperative; restless   Speech:  no latency; no press   Mood:  anxious   Affect:  guarded, mood-congruent   Thought Process:  normal and logical   Thought Content:  normal, no suicidality, no homicidality, delusions, or paranoia   Sensorium:  grossly intact   Cognition:  grossly intact   Insight:  intact   Judgment:  behavior is adequate to circumstances     Impression:  1. Depression - mod severe  2. Anxiety - moderate  3. Difficulty sleeping  4. May need PT    Plan:  1. Continue Zoloft 200mg q HS  2. Continue Wellbutrin XL 150mg q day  3. Start Trazodone 25-50mg as needed for insomnia  4. FU in 6 weeks      Initial Interview 07/27/2022  HPI: 68yo WFreferred by PCP to the [facility] for depression and anxiety  PMHx: HLD, GERD, Osteoporosis, Anxiety.    Patient presents with her .   Patient explains that although she is taking Zoloft 200mg q HS, she is still anxious and has low energy. She gets discouraged because she cannot do the things that she used to do.   States that she used to be able to do housework but now she does not have the energy. She stopped doing housework 3 months ago.   States that she has been depressed all of her life; since around 8 or 9.   Her mother and father . Her father was an alcoholic. They would argue and she would get nervous. She is still nervous. Worries about everything and every body.     States that she stopped doing things 3 months ago because she has a hard time standing up. Her back gets weak.   She fell and broke her arm about a month ago and had surgery two weeks ago.   One month before breaking her arm, she broke her foot while climbing into the truck. She could not get her leg up high enough to step onto the step to get into the truck. The step broke.    In 2012, she fell and had to have shoulder replacement.     States that she has been taking Zoloft for about 10 years and it  has worked well for her in the past.     Has not had a sleep study.   States that she does snore at night. Naps off and on during the day.   Does not feel that she could keep a mask on.   Requested PCP consider a sleep study.     Patient admits that she sits in the house a lot because she fears that she is going to fall. Both times that she fell, she reports that she had difficulty lifting her R leg. She fears that she is going to fall again.   Explained that the more she sits, the weaker she will become.  Strongly encouraged that she requests to go to PT to gain strength and confidence.     Will continue the Zoloft 200mg q HS.  Add Wellbutrin XL 150mg q day   FU in 3 weeks      Psychiatric History:   Reports a history of: depression and anxiety  History of mental health out-patient treatment:  History of in-patient psychiatric hospitalization: denies  History of suicidal ideations: denies  History of suicidal threats:  History of suicide attempts: denies  History of self mutilation: denies    History of psychotropic medications:   Strattera 40mg q day  Zoloft   Wellbutrin    Family Psychiatric History:  Mental Illness: denies  Alcohol abuse/addiction: father  Drug addiction:    Substance Use History:  Hx of addiction or abuse: denies  Alcohol: denies  Amphetamines: denies  Benzodiazepines: denies  Cocaine: denies  Opiates: recent Rx for Noco after breaking her arm  Marijuana: denies  Tobacco: denies  Caffeine:    Social History:  Grew up in: [place]  Raised by: mother  Number of siblings: one sister and two brothers  Education: 9th grade  Sexual identity: heterosexual  Marital status:  x 51 years  Number of children: 4 adult sons  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    PHQ score:  07/27/2022: 16 - moderately severe  Over the last two weeks how often have you been bothered by little interest or pleasure in doing things Nearly every day   Over the last two weeks how often have you been bothered by feeling down, depressed or hopeless Nearly every day   Over the last two weeks how often have you been bothered by trouble falling or staying asleep, or sleeping too much Nearly every day     Over the last two weeks how often have you been bothered by feeling tired or having little energy Nearly every day   Over the last two weeks how often have you been bothered by a poor appetite or overeating Not at all   Over the last two weeks how often have you been bothered by feeling bad about yourself - or that you are a failure or have let yourself or your family down Several days   Over the last two weeks how often have you been bothered by trouble concentrating on things, such as reading the newspaper or watching television Not at all   Over the last two weeks how often have you been bothered by moving or speaking so slowly that other people could have noticed. Or the opposite - being so fidgety or restless that you have been moving around a lot more than usual. Nearly every day   Over the last two weeks how often have you been bothered by thoughts that you would be better off dead, or of hurting yourself Not at all   If you checked off any problems, how difficult have these problems made it for you to do your work, take care of things at home or get along with other people? Very difficult   PHQ-9 Score 16   PHQ-9 Interpretation Moderately Severe     NELY-7:  07/27/2022: 14 - moderate anxiety  1. Feeling nervous, anxious, or on edge? Nearly everyday   2. Not being able to stop or control worrying? Nearly everyday   3. Worrying too much about different  things? Nearly everyday   4. Trouble relaxing? Nearly everyday   5. Being so restless that it is hard to sit still? Not at all   6. Becoming easily annoyed or irritable? Several days   7. Feeling afraid as if something awful might happen? Several days   8. If you checked off any problems, how difficult have these problems made it for you to do your work, take care of things at home, or get along with other people? Very difficult   NELY-7 Score 14   Number answered (out of first 7) 7   Interpretation Moderate Anxiety     Mental Status Evaluation:  Appearance:  casually dressed, neatly groomed, obese, seated in wheelchair   Behavior:  cooperative; restless   Speech:  no latency; no press   Mood:  anxious   Affect:  guarded, mood-congruent   Thought Process:  normal and logical   Thought Content:  normal, no suicidality, no homicidality, delusions, or paranoia   Sensorium:  grossly intact   Cognition:  grossly intact   Insight:  intact   Judgment:  behavior is adequate to circumstances     Impression:  1. Depression - mod severe  2. Anxiety - moderate  3. Difficulty sleeping  4. May need PT    Plan:  1. Zoloft 200mg q HS  2. Wellbutrin XL 150mg q day  3. Sleep study???  4. FU in 3 weeks

## 2024-06-04 NOTE — TELEPHONE ENCOUNTER
Spoke to pharmacist  The highest dose of Bupropion is 300 mg  Patient is prescribed 450 mg    Please send in 2 scripts  Bupropion 300 mg  Bupropion 150 mg  Thanks

## 2024-06-04 NOTE — TELEPHONE ENCOUNTER
Pharmacy called stating patients Wellbutrin 450 mg would cost $450.00  And patient had no insurance on file.  Called patient, she stated she did not have insurance for her medication, was paying out of pocket but could not afford $450.00    Pt is requesting something else    Provider was made aware and a Virtual appt was scheduled for pt 6/15/24 @ 3:00.to discuss meds

## 2024-06-04 NOTE — TELEPHONE ENCOUNTER
----- Message from Emily Muniz sent at 6/4/2024  9:36 AM CDT -----  Regarding: Pharmacy Call  Pts pharmacy called, needing clarification on directions for pts rx of buPROPion 450 mg Tb24 .

## 2024-06-05 RX ORDER — ALBUTEROL SULFATE 90 UG/1
2 AEROSOL, METERED RESPIRATORY (INHALATION) EVERY 4 HOURS PRN
Qty: 8 G | Refills: 2 | Status: SHIPPED | OUTPATIENT
Start: 2024-06-05 | End: 2024-06-07

## 2024-06-05 NOTE — TELEPHONE ENCOUNTER
----- Message from Ru Franks sent at 6/5/2024 10:38 AM CDT -----  .Type:  RX Refill Request    Who Called: Eleanor  Refill or New Rx: Refill   RX Name and Strength: albuterol (PROVENTIL/VENTOLIN HFA) 90 mcg/actuation inhaler  How is the patient currently taking it? (ex. 1XDay):  Is this a 30 day or 90 day RX:  Preferred Pharmacy with phone number: [facility]  Local or Mail Order: Local   Ordering Provider: Yael  Would the patient rather a call back or a response via [facility]?   Best Call Back Number: [phone]  Additional Information: Requested by patient to refill today.

## 2024-06-07 RX ORDER — ALBUTEROL SULFATE 90 UG/1
2 AEROSOL, METERED RESPIRATORY (INHALATION) EVERY 6 HOURS PRN
Qty: 18 G | Refills: 2 | Status: SHIPPED | OUTPATIENT
Start: 2024-06-07

## 2024-06-11 ENCOUNTER — OFFICE VISIT (OUTPATIENT)
Dept: INTERNAL MEDICINE | Facility: CLINIC | Age: 69
End: 2024-06-11
Payer: MEDICARE

## 2024-06-11 VITALS
RESPIRATION RATE: 18 BRPM | HEART RATE: 74 BPM | WEIGHT: 293 LBS | DIASTOLIC BLOOD PRESSURE: 62 MMHG | BODY MASS INDEX: 55.32 KG/M2 | HEIGHT: 61 IN | OXYGEN SATURATION: 97 % | SYSTOLIC BLOOD PRESSURE: 118 MMHG | TEMPERATURE: 98 F

## 2024-06-11 DIAGNOSIS — R33.9 URINARY RETENTION: ICD-10-CM

## 2024-06-11 DIAGNOSIS — F41.9 ANXIETY: Chronic | ICD-10-CM

## 2024-06-11 DIAGNOSIS — F32.A MILD DEPRESSION: Chronic | ICD-10-CM

## 2024-06-11 DIAGNOSIS — M81.0 AGE-RELATED OSTEOPOROSIS WITHOUT CURRENT PATHOLOGICAL FRACTURE: Chronic | ICD-10-CM

## 2024-06-11 DIAGNOSIS — G89.29 CHRONIC LOW BACK PAIN WITH SCIATICA, SCIATICA LATERALITY UNSPECIFIED, UNSPECIFIED BACK PAIN LATERALITY: Primary | ICD-10-CM

## 2024-06-11 DIAGNOSIS — E66.01 CLASS 3 SEVERE OBESITY DUE TO EXCESS CALORIES WITH SERIOUS COMORBIDITY AND BODY MASS INDEX (BMI) GREATER THAN OR EQUAL TO 70 IN ADULT: Chronic | ICD-10-CM

## 2024-06-11 DIAGNOSIS — M54.40 CHRONIC LOW BACK PAIN WITH SCIATICA, SCIATICA LATERALITY UNSPECIFIED, UNSPECIFIED BACK PAIN LATERALITY: Primary | ICD-10-CM

## 2024-06-11 DIAGNOSIS — G47.33 OSA ON CPAP: Chronic | ICD-10-CM

## 2024-06-11 PROCEDURE — 96372 THER/PROPH/DIAG INJ SC/IM: CPT | Mod: PBBFAC

## 2024-06-11 PROCEDURE — 99214 OFFICE O/P EST MOD 30 MIN: CPT | Mod: S$PBB,,, | Performed by: NURSE PRACTITIONER

## 2024-06-11 PROCEDURE — 99215 OFFICE O/P EST HI 40 MIN: CPT | Mod: PBBFAC | Performed by: NURSE PRACTITIONER

## 2024-06-11 RX ORDER — BACLOFEN 10 MG/1
10 TABLET ORAL 3 TIMES DAILY
Qty: 270 TABLET | Refills: 2 | Status: SHIPPED | OUTPATIENT
Start: 2024-06-11

## 2024-06-11 RX ORDER — ERGOCALCIFEROL 1.25 MG/1
50000 CAPSULE ORAL
Qty: 12 CAPSULE | Refills: 2 | Status: SHIPPED | OUTPATIENT
Start: 2024-06-11

## 2024-06-11 RX ORDER — PRAMIPEXOLE DIHYDROCHLORIDE 0.5 MG/1
0.5 TABLET ORAL 2 TIMES DAILY
Qty: 180 TABLET | Refills: 1 | Status: SHIPPED | OUTPATIENT
Start: 2024-06-11

## 2024-06-11 RX ORDER — DEXAMETHASONE SODIUM PHOSPHATE 4 MG/ML
4 INJECTION, SOLUTION INTRA-ARTICULAR; INTRALESIONAL; INTRAMUSCULAR; INTRAVENOUS; SOFT TISSUE
Status: COMPLETED | OUTPATIENT
Start: 2024-06-11 | End: 2024-06-11

## 2024-06-11 RX ORDER — ESOMEPRAZOLE MAGNESIUM 40 MG/1
40 CAPSULE, DELAYED RELEASE ORAL
Qty: 90 CAPSULE | Refills: 2 | Status: SHIPPED | OUTPATIENT
Start: 2024-06-11

## 2024-06-11 RX ADMIN — DEXAMETHASONE SODIUM PHOSPHATE 4 MG: 4 INJECTION INTRA-ARTICULAR; INTRALESIONAL; INTRAMUSCULAR; INTRAVENOUS; SOFT TISSUE at 11:06

## 2024-06-11 NOTE — PROGRESS NOTES
Patient ID: [id number]     Chief Complaint: Follow-up, Hyperlipidemia, Back Pain, and Hip Pain    HPI:     Eleanor Buckner is a 69 y.o. female with diagnosis of HLD, GERD, Osteoporosis, Hx of ORIF Right Distal Radius (07/14/2022), Chronic Low Back Pain, KI with CPAP, Obesity, Anxiety. Patient seen in clinic today for follow up on HLD. Patient last seen in clinic on 02/09/2024.   Patient states taking medications as prescribed, tolerating well. Patient currently prescribed Rosuvastatin 10 mg daily.   CT Chest ordered due to increased SOB, negative. Sleep Study completed on 09/12/2022; recommended CPAP. Patient states she is using it nightly. PFTs completed in 2020, normal.   Cardiology increased her Lasix to 20 mg daily prn edema. Repeat Echo completed on 12/06/2022, normal EF.  Patient seen by Dr. Musa for BLE edema.   Today, patient states still having bilateral leg pain, describes pain as restless leg. Patient currently prescribed Mirapex 0.5 mg Qhs. Patient requesting to increase Mirapex. Patient states still having back pain, followed by pain management (see below). MRI completed 01/09/2023 with moderate stenosis and disc bulge.   Patient also states some urinary retention at times. Patient denies dysuria, hematuria, abdominal pain, urinary incontinence. Patient denies bowel incontinence.   Labs were ordered for patient to complete prior to appt, patient did not complete, states will complete after visit.   Patient has diagnosis of KI and prescribed CPAP. Patient states using CPAP nightly, tolerating well.    Patient followed by Mental Health Provider, Eleanor Anderson NP. Last appointment on 06/04/2024. Depression - mild; Anxiety - moderate; Difficulty sleeping. Plan: Continue Zoloft 200mg q HS, Increase Wellbutrin XL to 450 mg q day, Continue Trazodone 25-50mg as needed for insomnia, Continue Buspar 5mg three times a day. Follow up appointment scheduled for 07/31/2024.     Patient followed by  Orthopedic Clinic. Last appointment on 02/28/2024. Dx: Primary Right Knee OA. Physical exam and previous imaging findings discussed with patient.  I believe she had a flare-up of her underlying arthritis had possibly ruptured a Baker's cyst.  Too soon for repeat injection today.  Patient is not a good surgical candidate due to elevated BMI.  We have discussed this today.  She will continue low-impact activities and Tylenol as tolerated.  I would like see the patient back in 2 months at this time. Patient has follow up appointment scheduled for 04/20/2023.     Patient currently followed by Pain Management for back pain. Last appointment on 12/15/2023. Intervertebral disc disorders with radiculopathy, lumbar region. Chronic bilateral low back pain with bilateral sciatica. Morbid obesity. Excellent pain control bilateral low back and legs. Pt concerned w/ braeden leg weakness no DM or peripheral neuropathy. Rec f/u PCP for dietician consult. Pt would like to try weight loss before EMG/NCS to bilateral lower extremities. Goal weight loss 15 pounds by next office visit in 8 weeks + discuss healthy weight loss options. Mediterranean given to patient. Patient had follow up appointment scheduled for 02/09/2024, patient canceled appointment.       Patient followed by Cardiology Clinic. Last appointment on 09/08/2023. CANO - stable, EF - 55% per ECHO 12.6.22, EF 55% per Echo 11.2021, EF 55% per Echo 7.3.19. Lexiscan Stress Test 5.13.22 showed  a mild intensity, small sized, fixed perfusion abnormality consistent with scar in the apical wall(s) in the typical distribution of the LAD territory. Lexiscan stress test - negative for ischemia with no wall motion abnormalities (9.19.19). PFTs - normal. Counseled patient on weight loss and increased activity as tolerated. HLD: LDL at goal. Continue Crestor 10mg daily. Counseled on low-cholesterol, low-fat diet, and exercise as tolerated. Peripheral edema: RLE venous study on 10.3.22  showed no DVT and no substantial reflux study. LLE venous study on 9.29.22 showed possible non-occlusive deep vein thrombus but no substantial reflux. LINDY testing Nov. 2021 - No evidence of significant arterial insufficiency was identified on the study. Continue Lasix to 20mg PO Qd prn and compression pumps. Follow up with [facility], Dr. Hannah mcneil. Morbid obesity: Counseled patient on weight loss, heart healthy diet, and increased activity as tolerated. KI: Compliant with home CPAP nightlyPatient has follow up appointment scheduled 03/08/2024.     Review of patient's allergies indicates:   Allergen Reactions    Nsaids (non-steroidal anti-inflammatory drug) Nausea And Vomiting     Breast Cancer Screening: MMG benign on 05/16/2024  Cervical Cancer Screening: deferred due to age  Colorectal Cancer Screening: Colonoscopy on 01/13/2020 with recommended repeat in 10 years  Diabetic Eye Exam: N/A  Diabetic Foot Exam: N/A  Lung Cancer Screening: N/A  Prostate Cancer Screening: N/A  AAA Screening: N/A  Osteoporosis Screening: Osteopenia on 05/24/2023   Medicare Wellness: ordered  Immunizations:   Immunization History   Administered Date(s) Administered    COVID-19, MRNA, LN-S, PF (MODERNA FULL 0.5 ML DOSE) 03/16/2021, 04/26/2021    Pneumococcal Conjugate - 13 Valent 09/17/2019     Past Surgical History:   Procedure Laterality Date    APPENDECTOMY      CHOLECYSTECTOMY      COLONOSCOPY  01/13/2020    ESOPHAGOGASTRODUODENOSCOPY  06/10/2016    INJECTION OF ANESTHETIC AGENT AROUND MEDIAL BRANCH NERVES INNERVATING LUMBAR FACET JOINT Right 02/22/2023    Procedure: Block-nerve-medial branch-lumbar;  Surgeon: Cassi Muñoz MD;  Location: [facility];  Service: Pain Management;  Laterality: Right;  L4/L5 and L5/S1    JOINT REPLACEMENT Right 03-    total shoulder    OPEN REDUCTION AND INTERNAL FIXATION (ORIF) OF FRACTURE OF DISTAL RADIUS Right 07/14/2022    Procedure: ORIF, FRACTURE, RADIUS, DISTAL;  Surgeon: Emery Morel MD;   Location: [facility];  Service: Orthopedics;  Laterality: Right;    PROCTOSCOPY  03/23/2016    TRANSFORAMINAL EPIDURAL INJECTION OF STEROID Right 04/05/2023    Procedure: Injection,steroid,epidural,transforaminal approach;  Surgeon: Cassi Muñoz MD;  Location: [facility] OR;  Service: Pain Management;  Laterality: Right;  TFESI on Right at L3/L4    TRANSFORAMINAL EPIDURAL INJECTION OF STEROID Bilateral 05/17/2023    Procedure: Injection,steroid,epidural,transforaminal approach;  Surgeon: Cassi Muñoz MD;  Location: [facility] OR;  Service: Pain Management;  Laterality: Bilateral;  Transforaminal Epidural Steroid Injection  Bilateral L4    TRANSFORAMINAL EPIDURAL INJECTION OF STEROID Bilateral 11/15/2023    Procedure: Injection,steroid,epidural,transforaminal approach;  Surgeon: Cassi Muñoz MD;  Location: [facility] OR;  Service: Pain Management;  Laterality: Bilateral;  Bilateral TFESI L4     family history includes Arthritis in her father; Asthma in her mother; Depression in her mother; Gout in her brother; Heart disease in her mother; Heart failure in her mother; Osteoporosis in her brother and sister.    Social History     Socioeconomic History    Marital status:    Tobacco Use    Smoking status: Never    Smokeless tobacco: Never    Tobacco comments:     never   Substance and Sexual Activity    Alcohol use: Never    Drug use: Never    Sexual activity: Yes     Partners: Male     Birth control/protection: Partner-Vasectomy     Social Determinants of Health     Financial Resource Strain: Medium Risk (1/10/2024)    Overall Financial Resource Strain (CARDIA)     Difficulty of Paying Living Expenses: Somewhat hard   Food Insecurity: Food Insecurity Present (1/10/2024)    Hunger Vital Sign     Worried About Running Out of Food in the Last Year: Sometimes true     Ran Out of Food in the Last Year: Sometimes true   Transportation Needs: No Transportation Needs (1/10/2024)    PRAPARE - Transportation     Lack of  Transportation (Medical): No     Lack of Transportation (Non-Medical): No   Physical Activity: Unknown (1/10/2024)    Exercise Vital Sign     Days of Exercise per Week: 0 days   Stress: Stress Concern Present (1/10/2024)    [employer] - Occupational Stress Questionnaire     Feeling of Stress : Very much   Housing Stability: Low Risk  (1/10/2024)    Housing Stability Vital Sign     Unable to Pay for Housing in the Last Year: No     Number of Places Lived in the Last Year: 0     Unstable Housing in the Last Year: No     Current Outpatient Medications   Medication Instructions    acetaminophen (TYLENOL) 1,000 mg, Oral, Nightly    alendronate (FOSAMAX) 70 mg, Oral, Weekly    baclofen (LIORESAL) 10 mg, Oral, 3 times daily    budesonide (RINOCORT AQUA) 32 mcg/actuation nasal spray 1 spray, Nasal, As needed (PRN)    buPROPion  mg Tb24 Take 300 mg by mouth once daily  mg Daily.    busPIRone (BUSPAR) 5 mg, Oral, 3 times daily    docusate sodium 100 mg, Oral, Daily PRN    ergocalciferol (ERGOCALCIFEROL) 50,000 Units, Oral, Every 7 days    esomeprazole (NEXIUM) 40 mg, Oral, Before breakfast    fluticasone propionate (FLONASE) 50 mcg/actuation nasal spray 2 sprays, Each Nostril, As needed (PRN)    folic acid (FOLVITE) 1 mg, Oral, Daily    furosemide (LASIX) 20 mg, Oral, Daily PRN    hydrocortisone 2.5 % cream Topical (Top), 2 times daily    hydrOXYzine HCL (ATARAX) 25 mg, Oral, 3 times daily PRN    loratadine (CLARITIN) 10 mg, Oral, Nightly    potassium chloride (KLOR-CON) 10 MEQ TbSR 10 mEq, Daily PRN    pramipexole (MIRAPEX) 0.5 mg, Oral, 2 times daily    rosuvastatin (CRESTOR) 10 mg, Oral, Nightly    sertraline (ZOLOFT) 200 mg, Oral, Nightly    traZODone (DESYREL) 25 mg, Oral, Nightly    VENTOLIN HFA 90 mcg/actuation inhaler 2 puffs, Inhalation, Every 6 hours PRN       Subjective:     Review of Systems   Constitutional: Negative.    HENT: Negative.     Eyes: Negative.   "  Respiratory: Negative.     Cardiovascular: Negative.    Gastrointestinal: Negative.    Endocrine: Negative.    Genitourinary: Negative.    Musculoskeletal:  Positive for arthralgias and back pain.   Skin: Negative.    Allergic/Immunologic: Negative.    Neurological: Negative.    Hematological: Negative.    Psychiatric/Behavioral: Negative.         Objective:     Visit Vitals  /62 (BP Location: Right arm, Patient Position: Sitting, BP Method: Large (Automatic))   Pulse 74   Temp 98.2 °F (36.8 °C) (Oral)   Resp 18   Ht 5' 1" (1.549 m)   Wt (!) 172.8 kg (381 lb)   SpO2 97%   BMI 71.99 kg/m²       Physical Exam  Vitals reviewed.   Constitutional:       Appearance: Normal appearance. She is obese.   HENT:      Head: Normocephalic and atraumatic.      Mouth/Throat:      Mouth: Mucous membranes are moist.      Pharynx: Oropharynx is clear.   Eyes:      Extraocular Movements: Extraocular movements intact.      Conjunctiva/sclera: Conjunctivae normal.      Pupils: Pupils are equal, round, and reactive to light.   Cardiovascular:      Rate and Rhythm: Normal rate and regular rhythm.      Heart sounds: Normal heart sounds.   Pulmonary:      Effort: Pulmonary effort is normal.      Breath sounds: Normal breath sounds.   Abdominal:      General: Bowel sounds are normal.   Musculoskeletal:         General: Normal range of motion.      Cervical back: Normal range of motion.      Comments: Patient in WC, states does ambulate at home but due to back pain cannot ambulate for long   Skin:     General: Skin is warm and dry.   Neurological:      Mental Status: She is alert and oriented to person, place, and time.   Psychiatric:         Mood and Affect: Mood normal.         Behavior: Behavior normal.       Labs Reviewed:     Hematology:  Lab Results   Component Value Date    WBC 5.64 01/13/2024    HGB 12.1 01/13/2024    HCT 41.8 01/13/2024     01/13/2024     Chemistry:  Lab Results   Component Value Date     " 01/13/2024    K 4.4 01/13/2024    BUN 16.5 01/13/2024    CREATININE 0.85 01/13/2024    EGFRNORACEVR >60 01/13/2024    GLUCOSE 118 (H) 01/13/2024    CALCIUM 9.5 01/13/2024    ALKPHOS 98 01/13/2024    LABPROT 7.8 (H) 01/13/2024    ALBUMIN 3.7 01/13/2024    BILIDIR 0.2 11/04/2021    IBILI 0.30 11/04/2021    AST 19 01/13/2024    ALT 21 01/13/2024    HQXXQODF16GT 18.3 (L) 01/13/2024     Lab Results   Component Value Date    HGBA1C 5.6 01/13/2024     Lipid Panel:  Lab Results   Component Value Date    CHOL 251 (H) 01/13/2024    HDL 61 (H) 01/13/2024    .00 (H) 01/13/2024    TRIG 139 01/13/2024    TOTALCHOLEST 4 01/13/2024     Thyroid:  Lab Results   Component Value Date    TSH 3.136 01/13/2024    T3FREE 2.29 03/11/2020     Urine:  Lab Results   Component Value Date    APPEARANCEUA Slightly Cloudy (A) 01/13/2024    SGUA 1.025 01/13/2024    PROTEINUA Negative 01/13/2024    KETONESUA Negative 01/13/2024    LEUKOCYTESUR Small (A) 01/13/2024    RBCUA None Seen 01/13/2024    WBCUA 11-20 (A) 01/13/2024    BACTERIA Rare 01/13/2024    CREATRANDUR 127.5 (H) 01/13/2024        Assessment:       ICD-10-CM ICD-9-CM   1. Chronic low back pain with sciatica, sciatica laterality unspecified, unspecified back pain laterality  M54.40 724.2    G89.29 724.3     338.29   2. Urinary retention  R33.9 788.20   3. Age-related osteoporosis without current pathological fracture  M81.0 733.01   4. KI on CPAP  G47.33 327.23   5. Class 3 severe obesity due to excess calories with serious comorbidity and body mass index (BMI) greater than or equal to 70 in adult  E66.01 278.01    Z68.45 V85.45   6. Anxiety  F41.9 300.00   7. Mild depression  F32.A 311        Plan:     1. Chronic low back pain with sciatica, sciatica laterality unspecified, unspecified back pain laterality  Decadron injection today  Continue with pain management  - baclofen (LIORESAL) 10 MG tablet; Take 1 tablet (10 mg total) by mouth 3 (three) times daily.  Dispense: 270 tablet;  Refill: 2    2. Urinary retention  - Urinalysis, Reflex to Urine Culture; Future    3. Age-related osteoporosis without current pathological fracture  Calcium level: 9.5  Vitamin D level: 18.3  Continue Alendronate 70 mg weekly    4. KI on CPAP  Sleep study completed 09/12/2022; CPAP started  Patient states needs refills on supplies for CPAP, message sent to sleep lab    5. Class 3 severe obesity due to excess calories with serious comorbidity and body mass index (BMI) greater than or equal to 70 in adult  Body mass index is 71.99 kg/m².  TSH   Date Value Ref Range Status   01/13/2024 3.136 0.350 - 4.940 uIU/mL Final     Vitamin D   Date Value Ref Range Status   01/13/2024 18.3 (L) 30.0 - 80.0 ng/mL Final     Hemoglobin A1c   Date Value Ref Range Status   01/13/2024 5.6 <=7.0 % Final   Sleep Study: KI on CPAP  Weight Loss Encouraged  Increase Physical Activity    6. Anxiety  Followed by Mental Health Provider  Continue Trazodone, Zoloft, Wellbutrin, Buspirone    7. Mild depression  Followed by Mental Health Provider  Continue Trazodone, Zoloft, Wellbutrin, Buspirone      Follow up in about 3 months (around 9/11/2024) for Labs, Virtual Visit. In addition to their scheduled follow up, the patient has also been instructed to follow up on as needed basis.     FRANCIS Montesinos

## 2024-06-19 ENCOUNTER — OFFICE VISIT (OUTPATIENT)
Dept: BEHAVIORAL HEALTH | Facility: CLINIC | Age: 69
End: 2024-06-19
Payer: MEDICARE

## 2024-06-19 DIAGNOSIS — F32.A MILD DEPRESSION: Primary | Chronic | ICD-10-CM

## 2024-06-19 PROCEDURE — 99212 OFFICE O/P EST SF 10 MIN: CPT | Mod: 95,,, | Performed by: NURSE PRACTITIONER

## 2024-06-19 RX ORDER — BUPROPION HYDROCHLORIDE 300 MG/1
300 TABLET ORAL DAILY
Qty: 30 TABLET | Refills: 3 | Status: SHIPPED | OUTPATIENT
Start: 2024-06-19

## 2024-06-19 NOTE — PROGRESS NOTES
"This is a virtual visit note. I have reviewed the patient's name verbally reviewed the past medical history, active medication list, and allergy list with the patient and verified with a picture ID if applicable. I have verified that this patient is in the state of [place]. The patient has consented to be treated remotely by telemedicine appointment via [facility] approved interactive audio format. The patient does not have access to a working audiovisualformat. The patient stated that they understood and accepted the privacy and security risks to their information at their location.  Originating site (where the patient is located): Patient Home   Distant site (where the provider is located): [facility]    Time spent with the patient was     minutes, over 50% of which was used for education and counseling regarding mental health conditions, current medications including risk/benefit and side effects/adverse events, OTC uses/doses, home self care, and indications for immediate medical attention. The patient is receptive, expresses understanding, and is agreeable to plan. All questions answered.    Follow-up #6  06/19/2024  HPI: 70yo WFreferred by PCP [facility]  PMHx: HLD, GERD, Osteoporosis, Anxiety.    On her last visit, patient was doing alright. She was more anxious and was not taking the Buspar.   Stated that she wanted to do things but it took all she had to do it (a lack of energy).   Explained that her age and her weight were significant factors and there may not be a medication that can give her the energy that she is looking for. The Wellbutrin XL was increased to 450mg a day but her insurance does not pay for medication.for it and she could not pay out of pocket.     Today, patient states that she is doing OK.   She denies feeling terribly depressed. She states taht she is tired; "it goes and comes."       Follow-up in 3 months - " virtual      PHQ score:  06/19/2024: virtual  06/04/2024: virtual  11/01/2023: virtual  09/19/2023: 10 moderate  03/17/2023: 7 mild  01/30/2023: 8? - mild to moderate  07/27/2022: 16 - moderately severe    NELY-7:  06/19/2024: virtual  06/04/2024: virtual  11/01/2023: virtual  09/19/2023: 13 moderate  03/17/2023: 13 moderate  01/30/2023: 6  mild  07/27/2022: 14 - moderate anxiety    Mental Status Evaluation:  Appearance:  appropriate   Behavior:  normal, cooperative   Speech:  no latency; no press   Mood:  euthymic   Affect:  mood congruent   Thought Process:  normal and logical   Thought Content:  normal, no suicidality, no homicidality, delusions, or paranoia   Sensorium:  grossly intact   Cognition:  grossly intact   Insight:  intact   Judgment:  behavior is adequate to circumstances      Impression:  1. Depression - mild  2. Anxiety - moderate  3. Difficulty sleeping    Plan:  1. Continue Zoloft 200mg q HS  2. Decrease Wellbutrin XL to 300mg q day  3. Continue Trazodone 25-50mg as needed for insomnia  4. Continue Buspar 5mg three times a day  5. FU in 3 months - virtual      Follow-up #5  06/04/2024  HPI: 68yo WFreferred by PCP to the [facility] and [facility]  PMHx: HLD, GERD, Osteoporosis, Anxiety.    On her last visit, patient was doing well.     Today, patient states that she is doing alright.  Patient states that she is more anxious and is not taking the Buspar.   She states that she wants to do things but it takes all she has to do it (a lack of energy).   She states that when she is invited to go out to see her grandchildren, she does not have the energy to go. She states that she pushes herself to go.   Patient is 68yo and 376lbs. She has to use a walker to ambulate. Her age and her weight are significant factors and there may not be a medication that can give her the energy that she is looking for.   But she also reporting anxiety. She states that there are so many things that she wants  to do and cannot do them. When asked if she was more depressed or anxious, she states that she is more depressed. But again, her age and her weight cannot be treated with medication.     Follow-up in 8 weeks - virtual      PHQ score:  06/04/2024: virtual  11/01/2023: virtual  09/19/2023: 10 moderate  03/17/2023: 7 mild  01/30/2023: 8? - mild to moderate  07/27/2022: 16 - moderately severe    NELY-7:  06/04/2024: virtual  11/01/2023: virtual  09/19/2023: 13 moderate  03/17/2023: 13 moderate  01/30/2023: 6  mild  07/27/2022: 14 - moderate anxiety    Mental Status Evaluation:  Appearance:  appropriate   Behavior:  normal, cooperative   Speech:  no latency; no press   Mood:  euthymic   Affect:  mood congruent   Thought Process:  normal and logical   Thought Content:  normal, no suicidality, no homicidality, delusions, or paranoia   Sensorium:  grossly intact   Cognition:  grossly intact   Insight:  intact   Judgment:  behavior is adequate to circumstances      Impression:  1. Depression - mild  2. Anxiety - moderate  3. Difficulty sleeping    Plan:  1. Continue Zoloft 200mg q HS  2. Increase Wellbutrin XL to 450mg q day  3. Continue Trazodone 25-50mg as needed for insomnia  4. Continue Buspar 5mg three times a day  5. FU in 8 weeks - virtual      Follow-up #4  11/01/2023  HPI: 67yo WFreferred by PCP to the [facility] for depression and anxiety  PMHx: HLD, GERD, Osteoporosis, Anxiety.    On her last visit, Buspar 5mg three times a day was added to the Zoloft and Wellbutrin.     Today, patient stats that she is doing alright.  She states that she does not notice any great difference. She is taking the Buspar twice a day. Encouraged to take three times a day if needed.   She states that she is not too anxious.     She states that she is sleeping good at night and sleeps some during the day. She is cutting back on the dose of Trazodone.    Follow-up in 3 months - virtual      PHQ score:  11/01/2023:  virtual  09/19/2023: 10 moderate  03/17/2023: 7 mild  01/30/2023: 8? - mild to moderate  07/27/2022: 16 - moderately severe    NELY-7:  11/01/2023: virtual  09/19/2023: 13 moderate  03/17/2023: 13 moderate  01/30/2023: 6  mild  07/27/2022: 14 - moderate anxiety    Mental Status Evaluation:  Appearance:  appropriate   Behavior:  normal, cooperative   Speech:  no latency; no press   Mood:  euthymic   Affect:  mood congruent   Thought Process:  normal and logical   Thought Content:  normal, no suicidality, no homicidality, delusions, or paranoia   Sensorium:  grossly intact   Cognition:  grossly intact   Insight:  intact   Judgment:  behavior is adequate to circumstances        Impression:  1. Depression - mild  2. Anxiety - moderate  3. Difficulty sleeping    Plan:  1. Continue Zoloft 200mg q HS  2. Continue Wellbutrin XL to 300mg q day  3. Continue Trazodone 25-50mg as needed for insomnia  4. Continue Buspar 5mg three times a day  5. FU in 3 months - virtual      Follow-up #3  09/19/2023/2023  HPI: 69yo WFreferred by PCP to the [facility]  PMHx: HLD, GERD, Osteoporosis, Anxiety.    Patient did not make her 6 week follow-up after her last visit in March. At that time, the Wellbutrin XL was increased to 300mg a day.    Today she states that she is taking the Zoloft 200mg and the Wellbutrin XL 300mg and that   She states that she is still tired; does not have any energy.   She is still having back pain. She has taken two shots and she has to take one more. She stated that there has been some pain relief when she takes the shots.   She states that she is unable to stand for 5 minutes without getting tired and having increased back pain.     She states that with the combination of the Trazodone and her CPAP, she is sleeping better. She states that she is resting better but does not have any more energy. Once she gets up, she naps in her chair.     Will continue Zoloft and Wellbutrin  XL.  Will add Buspar 5mg TID.  FU in 6 weeks  - virtual    Patient then states that Dr. Conway had given her something to lose weight and that she is taking it: Contrave. She had some left over from her original prescription. She thinks that she has lost about 10lbs.   She states that she wanted to see if it would help her to lose weight.   She states that she stopped taking it after it was prescribed by Dr. Conway because she did not think that it was working.   She has not talked to anyone about weight loss surgery.   She will FU with her PCP later this month.       PHQ score:  09/19/2023: 10 moderate  03/17/2023: 7 mild  01/30/2023: 8? - mild to moderate  07/27/2022: 16 - moderately severe    NELY-7:  09/19/2023: 13 moderate  03/17/2023: 13 moderate  01/30/2023: 6  mild  07/27/2022: 14 - moderate anxiety    Mental Status Evaluation:  Appearance:  Not assessed; telephone visit   Behavior:  normal, cooperative   Speech:  no latency; no press   Mood:  euthymic   Affect:  Not assessed; telephone visit   Thought Process:  normal and logical   Thought Content:  normal, no suicidality, no homicidality, delusions, or paranoia   Sensorium:  grossly intact   Cognition:  grossly intact   Insight:  intact   Judgment:  behavior is adequate to circumstances        Impression:  1. Depression - mild  2. Anxiety - moderate  3. Difficulty sleeping    Plan:  1. Continue Zoloft 200mg q HS  2. Continue Wellbutrin XL to 300mg q day  3. Continue Trazodone 25-50mg as needed for insomnia  4. Add Buspar 5mg three times a day  5. FU in 6 weeks - virtual      Follow-up #2 03/17/2023  HPI: 66yo WFreferred by PCP to the [facility]  PMHx: HLD, GERD, Osteoporosis, Anxiety.    On her last visit, patient had been using a CPAP for about 3 months and was having difficulty getting used to it. She was sleeping about 6 hours but was still not sleeping well. Stated that she feels that she might feel better if she could  sleep better. Trazodone 50mg was added for insomnia.   She was taking Zoloft 200mg q HS and Wellbutrin XL 150mg q day.   Stated that she was still anxious because she gets short of breath while trying to walk; feared that she would fall again.     Today, patient states that she is doing alright.  She states that she is taking Trazodone 25 and is sleeping better.   She is still taking the Zoloft 200mg at HS and Wellbutrin XL 150mg.     She states that she is still tired; has no energy. She would like to try an increase in the Wellbutrin XL. She finds that the Wellbutrin helps to decrease her anxiety. Discussed with patient the an increase in the Wellbutrin may not give her more energy but she would like to try.  Will increase to 300mg q day.     FU in 6 weeks. Patient requests audio call as she has great difficulty getting around.     PHQ score:  03/17/2023: 7 mild  01/30/2023: 8? - mild to moderate  07/27/2022: 16 - moderately severe    NELY-7:  03/17/2023: 13 moderate  01/30/2023: 6  mild  07/27/2022: 14 - moderate anxiety    Mental Status Evaluation:  Appearance:  Not assessed; telephone visit   Behavior:  normal, cooperative   Speech:  no latency; no press   Mood:  euthymic   Affect:  Not assessed; telephone visit   Thought Process:  normal and logical   Thought Content:  normal, no suicidality, no homicidality, delusions, or paranoia   Sensorium:  grossly intact   Cognition:  grossly intact   Insight:  intact   Judgment:  behavior is adequate to circumstances        Impression:  1. Depression - mild  2. Anxiety - moderate  3. Difficulty sleeping    Plan:  1. Continue Zoloft 200mg q HS  2. Increase Wellbutrin XL to 300mg q day  3. Continue Trazodone 25-50mg as needed for insomnia  4. FU in 6 weeks    Follow-up #1  01/30/2023  HPI: 68yo WFreferred by PCP to the [facility] for [facility] and anxiety  PMHx: HLD, GERD, Osteoporosis, Anxiety.    On her initial visit, she was to continue the Zoloft 200mg q HS and  Wellbutrin XL 150mg q day was added.  She did not make her FU appointment.    Today, She states that she feels that the Wellbutrin has been helpful.  She had a sleep study and now has a CPAP.   She states that she is having trouble getting used to the CPAP. She has had it for about 3 months.   When she turns, the CPAP will make a noise and she wakes up and cannot go back to sleep.   She takes two 10mg Melatonin at night.  Believes that she is getting about 6 hours of sleep a night: 3 hours in her bed with the CPA and then 3 hours in her recliner without the CPAP.     She feels tired during the day.   States that if she could get sleep, she would feel better.     She is taking the Zoloft 200mg q HS and the Wellbutrin XL 150mg q day.   States that she is still anxious because she gets short of breath while trying to walk. She fears that she will fall again.     Will add Trazodone 50mg q HS.     PHQ score:  01/30/2023: 8? - mild to moderate  07/27/2022: 16 - moderately severe    NELY-7:  01/30/2023: 6  mild  07/27/2022: 14 - moderate anxiety    Mental Status Evaluation:  Appearance:  casually dressed, neatly groomed, obese, seated in wheelchair   Behavior:  cooperative; restless   Speech:  no latency; no press   Mood:  anxious   Affect:  guarded, mood-congruent   Thought Process:  normal and logical   Thought Content:  normal, no suicidality, no homicidality, delusions, or paranoia   Sensorium:  grossly intact   Cognition:  grossly intact   Insight:  intact   Judgment:  behavior is adequate to circumstances     Impression:  1. Depression - mod severe  2. Anxiety - moderate  3. Difficulty sleeping  4. May need PT    Plan:  1. Continue Zoloft 200mg q HS  2. Continue Wellbutrin XL 150mg q day  3. Start Trazodone 25-50mg as needed for insomnia  4. FU in 6 weeks      Initial Interview 07/27/2022  HPI: 66yo WFreferred by PCP to the [facility] anxiety  PMHx: HLD, GERD, Osteoporosis, Anxiety.    Patient  presents with her .   Patient explains that although she is taking Zoloft 200mg q HS, she is still anxious and has low energy. She gets discouraged because she cannot do the things that she used to do.   States that she used to be able to do housework but now she does not have the energy. She stopped doing housework 3 months ago.   States that she has been depressed all of her life; since around 8 or 9.   Her mother and father . Her father was an alcoholic. They would argue and she would get nervous. She is still nervous. Worries about everything and every body.     States that she stopped doing things 3 months ago because she has a hard time standing up. Her back gets weak.   She fell and broke her arm about a month ago and had surgery two weeks ago.   One month before breaking her arm, she broke her foot while climbing into the truck. She could not get her leg up high enough to step onto the step to get into the truck. The step broke.    In 2012, she fell and had to have shoulder replacement.     States that she has been taking Zoloft for about 10 years and it has worked well for her in the past.     Has not had a sleep study.   States that she does snore at night. Naps off and on during the day.   Does not feel that she could keep a mask on.   Requested PCP consider a sleep study.     Patient admits that she sits in the house a lot because she fears that she is going to fall. Both times that she fell, she reports that she had difficulty lifting her R leg. She fears that she is going to fall again.   Explained that the more she sits, the weaker she will become.  Strongly encouraged that she requests to go to PT to gain strength and confidence.     Will continue the Zoloft 200mg q HS.  Add Wellbutrin XL 150mg q day   FU in 3 weeks      Psychiatric History:   Reports a history of: depression and anxiety  History of mental health out-patient treatment:  History of in-patient psychiatric  hospitalization: denies  History of suicidal ideations: denies  History of suicidal threats:  History of suicide attempts: denies  History of self mutilation: denies    History of psychotropic medications:   Strattera 40mg q day  Zoloft   Wellbutrin    Family Psychiatric History:  Mental Illness: denies  Alcohol abuse/addiction: father  Drug addiction:    Substance Use History:  Hx of addiction or abuse: denies  Alcohol: denies  Amphetamines: denies  Benzodiazepines: denies  Cocaine: denies  Opiates: recent Rx for Noco after breaking her arm  Marijuana: denies  Tobacco: denies  Caffeine:    Social History:  Grew up in: [place]  Raised by: mother  Number of siblings: one sister and two brothers  Education: 9th grade  Sexual identity: heterosexual  Marital status:  x 51 years  Number of children: 4 adult sons  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    PHQ score:  07/27/2022: 16 - moderately severe  Over the last two weeks how often have you been bothered by little interest or pleasure in doing things Nearly every day   Over the last two weeks how often have you been bothered by feeling down, depressed or hopeless Nearly every day   Over the last two weeks how often have you been bothered by trouble falling or staying asleep, or sleeping too much Nearly every day     Over the last two weeks how often have you been bothered by feeling tired or having little energy Nearly every day   Over the last two weeks how often have you been bothered by a poor appetite or overeating Not at all   Over the last two weeks how often have you been bothered by  feeling bad about yourself - or that you are a failure or have let yourself or your family down Several days   Over the last two weeks how often have you been bothered by trouble concentrating on things, such as reading the newspaper or watching television Not at all   Over the last two weeks how often have you been bothered by moving or speaking so slowly that other people could have noticed. Or the opposite - being so fidgety or restless that you have been moving around a lot more than usual. Nearly every day   Over the last two weeks how often have you been bothered by thoughts that you would be better off dead, or of hurting yourself Not at all   If you checked off any problems, how difficult have these problems made it for you to do your work, take care of things at home or get along with other people? Very difficult   PHQ-9 Score 16   PHQ-9 Interpretation Moderately Severe     NELY-7:  07/27/2022: 14 - moderate anxiety  1. Feeling nervous, anxious, or on edge? Nearly everyday   2. Not being able to stop or control worrying? Nearly everyday   3. Worrying too much about different things? Nearly everyday   4. Trouble relaxing? Nearly everyday   5. Being so restless that it is hard to sit still? Not at all   6. Becoming easily annoyed or irritable? Several days   7. Feeling afraid as if something awful might happen? Several days   8. If you checked off any problems, how difficult have these problems made it for you to do your work, take care of things at home, or get along with other people? Very difficult   NELY-7 Score 14   Number answered (out of first 7) 7   Interpretation Moderate Anxiety     Mental Status Evaluation:  Appearance:  casually dressed, neatly groomed, obese, seated in wheelchair   Behavior:  cooperative; restless   Speech:  no latency; no press   Mood:  anxious   Affect:  guarded, mood-congruent   Thought Process:  normal and logical   Thought Content:  normal, no suicidality, no homicidality,  delusions, or paranoia   Sensorium:  grossly intact   Cognition:  grossly intact   Insight:  intact   Judgment:  behavior is adequate to circumstances     Impression:  1. Depression - mod severe  2. Anxiety - moderate  3. Difficulty sleeping  4. May need PT    Plan:  1. Zoloft 200mg q HS  2. Wellbutrin XL 150mg q day  3. Sleep study???  4. FU in 3 weeks

## 2024-06-24 ENCOUNTER — TELEPHONE (OUTPATIENT)
Dept: BEHAVIORAL HEALTH | Facility: CLINIC | Age: 69
End: 2024-06-24
Payer: MEDICARE

## 2024-06-24 NOTE — TELEPHONE ENCOUNTER
The pharm. From [facility] called the office for clarification on patient's Wellbutrin.  I informed her that the patient was taking Wellbutrin  mg

## 2024-06-26 ENCOUNTER — LAB VISIT (OUTPATIENT)
Dept: LAB | Facility: HOSPITAL | Age: 69
End: 2024-06-26
Attending: NURSE PRACTITIONER
Payer: MEDICARE

## 2024-06-26 ENCOUNTER — OFFICE VISIT (OUTPATIENT)
Dept: CARDIOLOGY | Facility: CLINIC | Age: 69
End: 2024-06-26
Payer: MEDICARE

## 2024-06-26 VITALS
HEART RATE: 77 BPM | BODY MASS INDEX: 55.32 KG/M2 | SYSTOLIC BLOOD PRESSURE: 116 MMHG | WEIGHT: 293 LBS | HEIGHT: 61 IN | RESPIRATION RATE: 20 BRPM | DIASTOLIC BLOOD PRESSURE: 48 MMHG | TEMPERATURE: 99 F | OXYGEN SATURATION: 95 %

## 2024-06-26 DIAGNOSIS — R60.0 PERIPHERAL EDEMA: Chronic | ICD-10-CM

## 2024-06-26 DIAGNOSIS — R33.9 URINARY RETENTION: ICD-10-CM

## 2024-06-26 DIAGNOSIS — E78.5 HYPERLIPIDEMIA LDL GOAL <100: Chronic | ICD-10-CM

## 2024-06-26 DIAGNOSIS — R60.0 BILATERAL LOWER EXTREMITY EDEMA: ICD-10-CM

## 2024-06-26 DIAGNOSIS — R06.09 DOE (DYSPNEA ON EXERTION): Primary | ICD-10-CM

## 2024-06-26 DIAGNOSIS — G47.33 OSA ON CPAP: Chronic | ICD-10-CM

## 2024-06-26 DIAGNOSIS — R06.09 DYSPNEA ON EXERTION: ICD-10-CM

## 2024-06-26 LAB
ALBUMIN SERPL-MCNC: 3.6 G/DL (ref 3.4–4.8)
ALBUMIN/GLOB SERPL: 1.1 RATIO (ref 1.1–2)
ALP SERPL-CCNC: 93 UNIT/L (ref 40–150)
ALT SERPL-CCNC: 18 UNIT/L (ref 0–55)
ANION GAP SERPL CALC-SCNC: 9 MEQ/L
AST SERPL-CCNC: 20 UNIT/L (ref 5–34)
BACTERIA #/AREA URNS AUTO: ABNORMAL /HPF
BILIRUB SERPL-MCNC: 0.4 MG/DL
BILIRUB UR QL STRIP.AUTO: NEGATIVE
BUN SERPL-MCNC: 11.5 MG/DL (ref 9.8–20.1)
CALCIUM SERPL-MCNC: 9.5 MG/DL (ref 8.4–10.2)
CHLORIDE SERPL-SCNC: 107 MMOL/L (ref 98–107)
CHOLEST SERPL-MCNC: 184 MG/DL
CHOLEST/HDLC SERPL: 3 {RATIO} (ref 0–5)
CLARITY UR: CLEAR
CO2 SERPL-SCNC: 28 MMOL/L (ref 23–31)
COLOR UR AUTO: YELLOW
CREAT SERPL-MCNC: 0.93 MG/DL (ref 0.55–1.02)
CREAT/UREA NIT SERPL: 12
GFR SERPLBLD CREATININE-BSD FMLA CKD-EPI: >60 ML/MIN/1.73/M2
GLOBULIN SER-MCNC: 3.3 GM/DL (ref 2.4–3.5)
GLUCOSE SERPL-MCNC: 103 MG/DL (ref 82–115)
GLUCOSE UR QL STRIP: NEGATIVE
HDLC SERPL-MCNC: 63 MG/DL (ref 35–60)
HGB UR QL STRIP: NEGATIVE
KETONES UR QL STRIP: NEGATIVE
LDLC SERPL CALC-MCNC: 91 MG/DL (ref 50–140)
LEUKOCYTE ESTERASE UR QL STRIP: NEGATIVE
NITRITE UR QL STRIP: NEGATIVE
OHS QRS DURATION: 90 MS
OHS QTC CALCULATION: 431 MS
PH UR STRIP: 6 [PH]
POTASSIUM SERPL-SCNC: 4.7 MMOL/L (ref 3.5–5.1)
PROT SERPL-MCNC: 6.9 GM/DL (ref 5.8–7.6)
PROT UR QL STRIP: NEGATIVE
RBC #/AREA URNS AUTO: ABNORMAL /HPF
SODIUM SERPL-SCNC: 144 MMOL/L (ref 136–145)
SP GR UR STRIP.AUTO: 1.02 (ref 1–1.03)
SQUAMOUS #/AREA URNS AUTO: ABNORMAL /HPF
TRIGL SERPL-MCNC: 148 MG/DL (ref 37–140)
UROBILINOGEN UR STRIP-ACNC: 0.2
VLDLC SERPL CALC-MCNC: 30 MG/DL
WBC #/AREA URNS AUTO: ABNORMAL /HPF

## 2024-06-26 PROCEDURE — 80053 COMPREHEN METABOLIC PANEL: CPT

## 2024-06-26 PROCEDURE — 99214 OFFICE O/P EST MOD 30 MIN: CPT | Mod: S$PBB,,, | Performed by: NURSE PRACTITIONER

## 2024-06-26 PROCEDURE — 93005 ELECTROCARDIOGRAM TRACING: CPT

## 2024-06-26 PROCEDURE — 99215 OFFICE O/P EST HI 40 MIN: CPT | Mod: PBBFAC,25 | Performed by: NURSE PRACTITIONER

## 2024-06-26 PROCEDURE — 36415 COLL VENOUS BLD VENIPUNCTURE: CPT

## 2024-06-26 PROCEDURE — 80061 LIPID PANEL: CPT

## 2024-06-26 PROCEDURE — 81003 URINALYSIS AUTO W/O SCOPE: CPT

## 2024-06-26 RX ORDER — FUROSEMIDE 20 MG/1
20 TABLET ORAL DAILY PRN
Qty: 30 TABLET | Refills: 6 | Status: SHIPPED | OUTPATIENT
Start: 2024-06-26 | End: 2025-06-26

## 2024-06-26 RX ORDER — POTASSIUM CHLORIDE 750 MG/1
10 TABLET, EXTENDED RELEASE ORAL DAILY PRN
Qty: 30 TABLET | Refills: 6 | Status: SHIPPED | OUTPATIENT
Start: 2024-06-26 | End: 2025-01-22

## 2024-06-26 NOTE — PROGRESS NOTES
CHIEF COMPLAINT:   Chief Complaint   Patient presents with    Follow-up     Denies any cardiac problems                                                  HPI:  Eleanor Buckner 69 y.o. female with a past medical history of Morbid Obesity, KI/CPAP and HLD who presents to cardiology clinic for routine follow up and ongoing care.  Latest Echocardiogram obtained on 12.6.22 revealed a preserved EF of 55% and mild pulmonary hypertension.  Most recent ischemic evaluation includes a Lexiscan Stress Test on 5.13.22 which was abnormal revealing a mild intensity, small sized, fixed perfusion abnormality consistent with scar in the apical wall(s) in the typical distribution of the LAD territory that is currently being medically managed.        Patient presents to clinic today endorsing stable shortness of breath with exertion that has not progressed since last seen.  The patient requires baseline assistance with some of her ADLs but denies experiencing any chest pain or significant shortness of breath.  Of note, the patient's activity is reportedly significantly limited due to chronic back pain.  She states that she is unable to stand for prolonged periods of time due to the back pain.  She denies any other cardiovascular complaints of exertional chest pain, orthopnea, PND, claudication, palpitations, lightheadedness, near-syncope or syncope.  However, she continues to endorse intermittent episodes of bilateral lower extremity edema that is well managed with her PRN. diuretic and compression pumps.  She endorses compliance with her current medications and is currently tolerating without issue.    CARDIAC TESTING:  Echo 12.6.22     Interpretation Summary  · Mild tricuspid regurgitation.  · The left ventricle is normal in size with normal systolic function.  · The estimated ejection fraction is 55%.  · Normal right ventricular size with normal right ventricular systolic function.  · Mild left atrial enlargement.  · There is  mild pulmonary hypertension.     RLE Venous Insuffiencey Study 10.3.22:  There is no evidence of a right lower extremity DVT.  There was no substantial reflux identified in the visualized veins of the right leg deep system, GSV, saphenofemoral junction, or SSV.     LLE Venous Insuffiencey Study 9.29.22:                                                                                                                                                                         There was a thin linear echogenic filling defect in the femoral vein at the level of the mid thigh. This finding could represent a non-occlusive chronic deep vein thrombus. The femoral vein appears patent and compressible where visualized.   There was no substantial reflux identified in the visualized veins of the left leg deep system, GSV, saphenofemoral junction, or SSV.     Nuclear Stress Test 5.13.22:  Abnormal myocardial perfusion scan.  There is a mild intensity, small sized, fixed perfusion abnormality consistent with scar in the apical wall(s) in the typical distribution of the LAD territory.  There are no other significant perfusion abnormalities.  The gated perfusion images showed an ejection fraction of 64% post stress.  The patient reported no chest pain during the stress test.     Echocardiogram November 2021:  Left ventricular ejection fraction is measured at approximately 55%.  Structurally normal mitral valve.  Structurally trileaflet aortic valve.  No evidence of tricuspid regurgitation.  No evidence of pulmonic regurgitation.  Mildly dilated left atrium.  Normal right atrial size.  Right ventricular cavity is normal in size.  No evidence of a pericardial effusion.  No evidence of pleural effusion.  IVC is normal.    LINDY testing November 2021:  No evidence of significant arterial insufficiency was identified on the study.    Lexiscan 9.19.19:  EF 59% with no wall motion abnormalities  No ischemia, inferior scar                                                                                                                                                                                         TTE 7.13.19:  Left ventricular ejection fraction measured approximately 55%  Moderate dilated left atrium  There is mild tricuspid regurgitation with estimated RVSP of 36 mmHg  Trace pulmonic regurgitation present       Patient Active Problem List   Diagnosis    Osteoporosis    GERD (gastroesophageal reflux disease)    Hyperlipidemia LDL goal <100    Anxiety    Dyspnea on exertion    Class 3 severe obesity due to excess calories with serious comorbidity and body mass index (BMI) greater than or equal to 70 in adult    Closed fracture of right distal radius    Peripheral edema    Muscle weakness (generalized)    Bilateral chronic knee pain    Unsteadiness on feet    Multiple falls    Vitamin D deficiency    KI on CPAP    Bilateral lower extremity edema    Impaired fasting glucose    Low back pain with sciatica    Mild depression    Difficulty sleeping     Past Surgical History:   Procedure Laterality Date    APPENDECTOMY      CHOLECYSTECTOMY      COLONOSCOPY  01/13/2020    ESOPHAGOGASTRODUODENOSCOPY  06/10/2016    INJECTION OF ANESTHETIC AGENT AROUND MEDIAL BRANCH NERVES INNERVATING LUMBAR FACET JOINT Right 02/22/2023    Procedure: Block-nerve-medial branch-lumbar;  Surgeon: Cassi Muñoz MD;  Location: [facility];  Service: Pain Management;  Laterality: Right;  L4/L5 and L5/S1    JOINT REPLACEMENT Right 03-    total shoulder    OPEN REDUCTION AND INTERNAL FIXATION (ORIF) OF FRACTURE OF DISTAL RADIUS Right 07/14/2022    Procedure: ORIF, FRACTURE, RADIUS, DISTAL;  Surgeon: Emery Morel MD;  Location: [facility];  Service: Orthopedics;  Laterality: Right;    PROCTOSCOPY  03/23/2016    TRANSFORAMINAL EPIDURAL INJECTION OF STEROID Right 04/05/2023    Procedure: Injection,steroid,epidural,transforaminal approach;  Surgeon: Cassi Muñoz MD;  Location:  [facility];  Service: Pain Management;  Laterality: Right;  TFESI on Right at L3/L4    TRANSFORAMINAL EPIDURAL INJECTION OF STEROID Bilateral 05/17/2023    Procedure: Injection,steroid,epidural,transforaminal approach;  Surgeon: Cassi Muñoz MD;  Location: [facility];  Service: Pain Management;  Laterality: Bilateral;  Transforaminal Epidural Steroid Injection  Bilateral L4    TRANSFORAMINAL EPIDURAL INJECTION OF STEROID Bilateral 11/15/2023    Procedure: Injection,steroid,epidural,transforaminal approach;  Surgeon: Cassi Muñoz MD;  Location: [facility];  Service: Pain Management;  Laterality: Bilateral;  Bilateral TFESI L4     Social History     Socioeconomic History    Marital status:    Tobacco Use    Smoking status: Never    Smokeless tobacco: Never    Tobacco comments:     never   Substance and Sexual Activity    Alcohol use: Never    Drug use: Never    Sexual activity: Yes     Partners: Male     Birth control/protection: Partner-Vasectomy     Social Determinants of Health     Financial Resource Strain: Medium Risk (1/10/2024)    Overall Financial Resource Strain (CARDIA)     Difficulty of Paying Living Expenses: Somewhat hard   Food Insecurity: Food Insecurity Present (1/10/2024)    Hunger Vital Sign     Worried About Running Out of Food in the Last Year: Sometimes true     Ran Out of Food in the Last Year: Sometimes true   Transportation Needs: No Transportation Needs (1/10/2024)    PRAPARE - Transportation     Lack of Transportation (Medical): No     Lack of Transportation (Non-Medical): No   Physical Activity: Unknown (1/10/2024)    Exercise Vital Sign     Days of Exercise per Week: 0 days   Stress: Stress Concern Present (1/10/2024)    [employer] [place] [employer] - Occupational Stress Questionnaire     Feeling of Stress : Very much   Housing Stability: Low Risk  (1/10/2024)    Housing Stability Vital Sign     Unable to Pay for Housing in the Last Year: No     Number of Places Lived in  "the Last Year: 0     Unstable Housing in the Last Year: No        Family History   Problem Relation Name Age of Onset    Heart failure Mother Haritha whitlock     Heart disease Mother Haritha whitlock     Asthma Mother Haritha whitlock     Depression Mother Haritha whitlock     Osteoporosis Sister      Osteoporosis Brother      Gout Brother      Arthritis Father Anabel aM      Review of patient's allergies indicates:   Allergen Reactions    Nsaids (non-steroidal anti-inflammatory drug) Nausea And Vomiting         ROS:  Review of Systems   Constitutional: Negative.    HENT: Negative.     Eyes: Negative.    Respiratory:  Positive for shortness of breath.    Cardiovascular: Negative.  Negative for chest pain.   Gastrointestinal: Negative.    Genitourinary: Negative.    Musculoskeletal: Negative.    Skin: Negative.    Neurological: Negative.    Endo/Heme/Allergies: Negative.    Psychiatric/Behavioral: Negative.                                                                                                                                                                                  Negative except as stated in the history of present illness. See HPI for details.    PHYSICAL EXAM:  Visit Vitals  BP (!) 116/48 (BP Location: Left arm, Patient Position: Sitting, BP Method: X-Large (Automatic))   Pulse 77   Temp 99.3 °F (37.4 °C) (Oral)   Resp 20   Ht 5' 1" (1.549 m)   Wt (!) 160.2 kg (353 lb 3.2 oz)   SpO2 95%   BMI 66.74 kg/m²       Physical Exam  HENT:      Head: Normocephalic.      Nose: Nose normal.   Eyes:      Pupils: Pupils are equal, round, and reactive to light.   Cardiovascular:      Rate and Rhythm: Normal rate and regular rhythm.   Pulmonary:      Effort: Pulmonary effort is normal.   Abdominal:      General: Abdomen is flat. Bowel sounds are normal.      Palpations: Abdomen is soft.   Musculoskeletal:         General: Normal range of motion.      Cervical back: Normal range of motion.   Skin:     General: Skin " is warm.   Neurological:      General: No focal deficit present.      Mental Status: She is alert.   Psychiatric:         Mood and Affect: Mood normal.         Current Outpatient Medications   Medication Instructions    acetaminophen (TYLENOL) 1,000 mg, Oral, Nightly    alendronate (FOSAMAX) 70 mg, Oral, Weekly    baclofen (LIORESAL) 10 mg, Oral, 3 times daily    budesonide (RINOCORT AQUA) 32 mcg/actuation nasal spray 1 spray, Nasal, As needed (PRN)    buPROPion (WELLBUTRIN XL) 300 mg, Oral, Daily    busPIRone (BUSPAR) 5 mg, Oral, 3 times daily    docusate sodium 100 mg, Oral, Daily PRN    ergocalciferol (ERGOCALCIFEROL) 50,000 Units, Oral, Every 7 days    esomeprazole (NEXIUM) 40 mg, Oral, Before breakfast    fluticasone propionate (FLONASE) 50 mcg/actuation nasal spray 2 sprays, Each Nostril, As needed (PRN)    folic acid (FOLVITE) 1 mg, Oral, Daily    furosemide (LASIX) 20 mg, Oral, Daily PRN    hydrocortisone 2.5 % cream Topical (Top), 2 times daily    hydrOXYzine HCL (ATARAX) 25 mg, Oral, 3 times daily PRN    loratadine (CLARITIN) 10 mg, Oral, Nightly    potassium chloride (KLOR-CON) 10 MEQ TbSR 10 mEq, Oral, Daily PRN    pramipexole (MIRAPEX) 0.5 mg, Oral, 2 times daily    rosuvastatin (CRESTOR) 10 mg, Oral, Nightly    sertraline (ZOLOFT) 200 mg, Oral, Nightly    traZODone (DESYREL) 25 mg, Oral, Nightly    VENTOLIN HFA 90 mcg/actuation inhaler 2 puffs, Inhalation, Every 6 hours PRN        All medications, laboratory studies, cardiac diagnostic imaging reviewed.     Lab Results   Component Value Date    LDL 91.00 06/26/2024    .00 (H) 01/13/2024    TRIG 148 (H) 06/26/2024    TRIG 139 01/13/2024    CREATININE 0.93 06/26/2024    K 4.7 06/26/2024        ASSESSMENT/PLAN:  Abnormal Nuclear Stress Test   - Mild intensity, small sized, fixed perfusion abnormality consistent with scar in the apical wall(s) in the typical distribution of the LAD territory (5.13.22).  Currently being medically managed  - Denies  any CP. Reports stable shortness of breath with exertion.  ADLs unchanged  - Continue current medication management with Crestor. No longer taking ranolazine  - Instructed patient to notify clinic of any anginal/increase anginal equivalent symptoms.  Will plan to maximize antianginals and/or repeat ischemic evaluation at this point  - Strict ED precautions provided     CANO - stable   - EF - 55%, mild tricuspid regurgitation, mild pulmonary hypertension per ECHO 12.6.22  - EF 55% per Echo 11.2021  - EF 55% per Echo 7.3.19  - Lexiscan Stress Test 5.13.22 showed  a mild intensity, small sized, fixed perfusion abnormality consistent with scar in the apical wall(s) in the typical distribution of the LAD territory   - Lexiscan stress test - negative for ischemia with no wall motion abnormalities (9.19.19)  - PFTs - normal  - Counseled patient on weight loss and increased activity as tolerated. Has back limitations    HLD  - LDL at goal -91  - Continue Crestor 10mg daily   - Counseled on low-cholesterol, low-fat diet, and exercise as tolerated    Peripheral edema- stable   - RLE venous study on 10.3.22 showed no DVT and no substantial reflux study  - LLE venous study on 9.29.22 showed possible non-occlusive deep vein thrombus but no substantial reflux  - LINDY testing Nov. 2021 - No evidence of significant arterial insufficiency was identified on the study  - Continue Lasix to 20mg PO Qd prn and compression pumps   - Follow up with [facility], Dr. Hannah mcneil     Morbid obesity  - Counseled patient on weight loss, heart healthy diet, and increased activity as tolerated     KI/CPAP  - Reports nightly compliance with CPAP  - Recommend continued nightly compliance        EKG  Follow up in Cardiology Clinic in 6 months or sooner if needed via telemedicine   Follow up with PCP as directed

## 2024-06-26 NOTE — PATIENT INSTRUCTIONS
EKG  Follow up in Cardiology Clinic in 6 months or sooner if needed via telemedicine   Follow up with PCP as directed

## 2024-07-31 ENCOUNTER — OFFICE VISIT (OUTPATIENT)
Dept: BEHAVIORAL HEALTH | Facility: CLINIC | Age: 69
End: 2024-07-31
Payer: MEDICARE

## 2024-07-31 DIAGNOSIS — F32.A MILD DEPRESSION: Primary | Chronic | ICD-10-CM

## 2024-07-31 DIAGNOSIS — F41.9 ANXIETY: Chronic | ICD-10-CM

## 2024-07-31 PROCEDURE — 99213 OFFICE O/P EST LOW 20 MIN: CPT | Mod: 95,,, | Performed by: NURSE PRACTITIONER

## 2024-07-31 NOTE — PROGRESS NOTES
This is a virtual visit note. I have reviewed the patient's name verbally reviewed the past medical history, active medication list, and allergy list with the patient and verified with a picture ID if applicable. I have verified that this patient is in the state of [place]. The patient has consented to be treated remotely by telemedicine appointment via [facility] approved interactive audio format. The patient does not have access to a working audiovisualformat. The patient stated that they understood and accepted the privacy and security risks to their information at their location.  Originating site (where the patient is located): Patient Home   Distant site (where the provider is located): [facility]    Time spent with the patient was  10 minutes, over 50% of which was used for education and counseling regarding mental health conditions, current medications including risk/benefit and side effects/adverse events, OTC uses/doses, home self care, and indications for immediate medical attention. The patient is receptive, expresses understanding, and is agreeable to plan. All questions answered.    Follow-up #7  07/31/2024  HPI: 70yo WFreferred by PCP [facility]  PMHx: HLD, GERD, Osteoporosis, Anxiety.    On her last visit, patient was doing OK. She denied feeling terribly depressed.     Today, patient states that she is doing OK. She states that she hurts all over; her knees and back.   She states that she is taking the Zoloft and Wellbutrin and they are working well. She is sleeping well with the Trazodone and her CPAP. She states that she is still tired throughout the day. She has no energy.   Patient has to pay out of pocket for all of her medications, therefore, she is very limited in what she can take.     No med changes made.    Follow-up in 6 months - virtual    PHQ score:  07/31/2024: virtual  06/19/2024: virtual  06/04/2024:  "virtual  11/01/2023: virtual  09/19/2023: 10 moderate  03/17/2023: 7 mild  01/30/2023: 8? - mild to moderate  07/27/2022: 16 - moderately severe    NELY-7:  07/31/2024: virtual   06/19/2024: virtual  06/04/2024: virtual  11/01/2023: virtual  09/19/2023: 13 moderate  03/17/2023: 13 moderate  01/30/2023: 6  mild  07/27/2022: 14 - moderate anxiety    Mental Status Evaluation:  Appearance:  appropriate   Behavior:  normal, cooperative   Speech:  no latency; no press   Mood:  euthymic   Affect:  mood congruent   Thought Process:  normal and logical   Thought Content:  normal, no suicidality, no homicidality, delusions, or paranoia   Sensorium:  grossly intact   Cognition:  grossly intact   Insight:  intact   Judgment:  behavior is adequate to circumstances      Impression:  1. Depression - mild  2. Anxiety - moderate  3. Difficulty sleeping    Plan:  1. Continue Zoloft 200mg q HS  2. Continue Wellbutrin XL to 300mg q day  3. Continue Trazodone 25-50mg as needed for insomnia  4. Continue Buspar 5mg three times a day  5. FU in 6 months - virtual      Follow-up #6  06/19/2024  HPI: 70yo WFreferred by PCP to the [facility]  PMHx: HLD, GERD, Osteoporosis, Anxiety.    On her last visit, patient was doing alright. She was more anxious and was not taking the Buspar.   Stated that she wanted to do things but it took all she had to do it (a lack of energy).   Explained that her age and her weight were significant factors and there may not be a medication that can give her the energy that she is looking for. The Wellbutrin XL was increased to 450mg a day but her insurance does not pay for medication.for it and she could not pay out of pocket.     Today, patient states that she is doing OK.   She denies feeling terribly depressed. She states that she is tired; "it goes and comes."     Follow-up in 3 months - virtual    PHQ score:  06/19/2024: virtual  06/04/2024: virtual  11/01/2023: " virtual  09/19/2023: 10 moderate  03/17/2023: 7 mild  01/30/2023: 8? - mild to moderate  07/27/2022: 16 - moderately severe    NELY-7:  06/19/2024: virtual  06/04/2024: virtual  11/01/2023: virtual  09/19/2023: 13 moderate  03/17/2023: 13 moderate  01/30/2023: 6  mild  07/27/2022: 14 - moderate anxiety    Mental Status Evaluation:  Appearance:  appropriate   Behavior:  normal, cooperative   Speech:  no latency; no press   Mood:  euthymic   Affect:  mood congruent   Thought Process:  normal and logical   Thought Content:  normal, no suicidality, no homicidality, delusions, or paranoia   Sensorium:  grossly intact   Cognition:  grossly intact   Insight:  intact   Judgment:  behavior is adequate to circumstances      Impression:  1. Depression - mild  2. Anxiety - moderate  3. Difficulty sleeping    Plan:  1. Continue Zoloft 200mg q HS  2. Decrease Wellbutrin XL to 300mg q day  3. Continue Trazodone 25-50mg as needed for insomnia  4. Continue Buspar 5mg three times a day  5. FU in 3 months - virtual      Follow-up #5  06/04/2024  HPI: 68yo WFreferred by PCP to the [facility] for [facility] and anxiety  PMHx: HLD, GERD, Osteoporosis, Anxiety.    On her last visit, patient was doing well.     Today, patient states that she is doing alright.  Patient states that she is more anxious and is not taking the Buspar.   She states that she wants to do things but it takes all she has to do it (a lack of energy).   She states that when she is invited to go out to see her grandchildren, she does not have the energy to go. She states that she pushes herself to go.   Patient is 68yo and 376lbs. She has to use a walker to ambulate. Her age and her weight are significant factors and there may not be a medication that can give her the energy that she is looking for.   But she also reporting anxiety. She states that there are so many things that she wants to do and cannot do them. When asked if she was more depressed or anxious, she  states that she is more depressed. But again, her age and her weight cannot be treated with medication.     Follow-up in 8 weeks - virtual      PHQ score:  06/04/2024: virtual  11/01/2023: virtual  09/19/2023: 10 moderate  03/17/2023: 7 mild  01/30/2023: 8? - mild to moderate  07/27/2022: 16 - moderately severe    NELY-7:  06/04/2024: virtual  11/01/2023: virtual  09/19/2023: 13 moderate  03/17/2023: 13 moderate  01/30/2023: 6  mild  07/27/2022: 14 - moderate anxiety    Mental Status Evaluation:  Appearance:  appropriate   Behavior:  normal, cooperative   Speech:  no latency; no press   Mood:  euthymic   Affect:  mood congruent   Thought Process:  normal and logical   Thought Content:  normal, no suicidality, no homicidality, delusions, or paranoia   Sensorium:  grossly intact   Cognition:  grossly intact   Insight:  intact   Judgment:  behavior is adequate to circumstances      Impression:  1. Depression - mild  2. Anxiety - moderate  3. Difficulty sleeping    Plan:  1. Continue Zoloft 200mg q HS  2. Increase Wellbutrin XL to 450mg q day  3. Continue Trazodone 25-50mg as needed for insomnia  4. Continue Buspar 5mg three times a day  5. FU in 8 weeks - virtual      Follow-up #4  11/01/2023  HPI: 69yo WFreferred by PCP to the [facility] for depression and anxiety  PMHx: HLD, GERD, Osteoporosis, Anxiety.    On her last visit, Buspar 5mg three times a day was added to the Zoloft and Wellbutrin.     Today, patient stats that she is doing alright.  She states that she does not notice any great difference. She is taking the Buspar twice a day. Encouraged to take three times a day if needed.   She states that she is not too anxious.     She states that she is sleeping good at night and sleeps some during the day. She is cutting back on the dose of Trazodone.    Follow-up in 3 months - virtual      PHQ score:  11/01/2023: virtual  09/19/2023: 10 moderate  03/17/2023: 7 mild  01/30/2023: 8? - mild to  moderate  07/27/2022: 16 - moderately severe    NELY-7:  11/01/2023: virtual  09/19/2023: 13 moderate  03/17/2023: 13 moderate  01/30/2023: 6  mild  07/27/2022: 14 - moderate anxiety    Mental Status Evaluation:  Appearance:  appropriate   Behavior:  normal, cooperative   Speech:  no latency; no press   Mood:  euthymic   Affect:  mood congruent   Thought Process:  normal and logical   Thought Content:  normal, no suicidality, no homicidality, delusions, or paranoia   Sensorium:  grossly intact   Cognition:  grossly intact   Insight:  intact   Judgment:  behavior is adequate to circumstances        Impression:  1. Depression - mild  2. Anxiety - moderate  3. Difficulty sleeping    Plan:  1. Continue Zoloft 200mg q HS  2. Continue Wellbutrin XL to 300mg q day  3. Continue Trazodone 25-50mg as needed for insomnia  4. Continue Buspar 5mg three times a day  5. FU in 3 months - virtual      Follow-up #3  09/19/2023/2023  HPI: 69yo WFreferred by PCP to the [facility] for [facility] and anxiety  PMHx: HLD, GERD, Osteoporosis, Anxiety.    Patient did not make her 6 week follow-up after her last visit in March. At that time, the Wellbutrin XL was increased to 300mg a day.    Today she states that she is taking the Zoloft 200mg and the Wellbutrin XL 300mg and that   She states that she is still tired; does not have any energy.   She is still having back pain. She has taken two shots and she has to take one more. She stated that there has been some pain relief when she takes the shots.   She states that she is unable to stand for 5 minutes without getting tired and having increased back pain.     She states that with the combination of the Trazodone and her CPAP, she is sleeping better. She states that she is resting better but does not have any more energy. Once she gets up, she naps in her chair.     Will continue Zoloft and Wellbutrin XL.  Will add Buspar 5mg TID.  FU in 6 weeks  - virtual    Patient then states that  Dr. Conway had given her something to lose weight and that she is taking it: Contrave. She had some left over from her original prescription. She thinks that she has lost about 10lbs.   She states that she wanted to see if it would help her to lose weight.   She states that she stopped taking it after it was prescribed by Dr. Conway because she did not think that it was working.   She has not talked to anyone about weight loss surgery.   She will FU with her PCP later this month.       PHQ score:  09/19/2023: 10 moderate  03/17/2023: 7 mild  01/30/2023: 8? - mild to moderate  07/27/2022: 16 - moderately severe    NELY-7:  09/19/2023: 13 moderate  03/17/2023: 13 moderate  01/30/2023: 6  mild  07/27/2022: 14 - moderate anxiety    Mental Status Evaluation:  Appearance:  Not assessed; telephone visit   Behavior:  normal, cooperative   Speech:  no latency; no press   Mood:  euthymic   Affect:  Not assessed; telephone visit   Thought Process:  normal and logical   Thought Content:  normal, no suicidality, no homicidality, delusions, or paranoia   Sensorium:  grossly intact   Cognition:  grossly intact   Insight:  intact   Judgment:  behavior is adequate to circumstances        Impression:  1. Depression - mild  2. Anxiety - moderate  3. Difficulty sleeping    Plan:  1. Continue Zoloft 200mg q HS  2. Continue Wellbutrin XL to 300mg q day  3. Continue Trazodone 25-50mg as needed for insomnia  4. Add Buspar 5mg three times a day  5. FU in 6 weeks - virtual      Follow-up #2 03/17/2023  HPI: 68yo WFreferred by PCP to the [facility] for depression and anxiety  PMHx: HLD, GERD, Osteoporosis, Anxiety.    On her last visit, patient had been using a CPAP for about 3 months and was having difficulty getting used to it. She was sleeping about 6 hours but was still not sleeping well. Stated that she feels that she might feel better if she could sleep better. Trazodone 50mg was added for insomnia.   She was taking Zoloft 200mg q HS  and Wellbutrin XL 150mg q day.   Stated that she was still anxious because she gets short of breath while trying to walk; feared that she would fall again.     Today, patient states that she is doing alright.  She states that she is taking Trazodone 25 and is sleeping better.   She is still taking the Zoloft 200mg at HS and Wellbutrin XL 150mg.     She states that she is still tired; has no energy. She would like to try an increase in the Wellbutrin XL. She finds that the Wellbutrin helps to decrease her anxiety. Discussed with patient the an increase in the Wellbutrin may not give her more energy but she would like to try.  Will increase to 300mg q day.     FU in 6 weeks. Patient requests audio call as she has great difficulty getting around.     PHQ score:  03/17/2023: 7 mild  01/30/2023: 8? - mild to moderate  07/27/2022: 16 - moderately severe    NELY-7:  03/17/2023: 13 moderate  01/30/2023: 6  mild  07/27/2022: 14 - moderate anxiety    Mental Status Evaluation:  Appearance:  Not assessed; telephone visit   Behavior:  normal, cooperative   Speech:  no latency; no press   Mood:  euthymic   Affect:  Not assessed; telephone visit   Thought Process:  normal and logical   Thought Content:  normal, no suicidality, no homicidality, delusions, or paranoia   Sensorium:  grossly intact   Cognition:  grossly intact   Insight:  intact   Judgment:  behavior is adequate to circumstances        Impression:  1. Depression - mild  2. Anxiety - moderate  3. Difficulty sleeping    Plan:  1. Continue Zoloft 200mg q HS  2. Increase Wellbutrin XL to 300mg q day  3. Continue Trazodone 25-50mg as needed for insomnia  4. FU in 6 weeks    Follow-up #1  01/30/2023  HPI: 66yo WFreferred by PCP to the [facility] for depression and anxiety  PMHx: HLD, GERD, Osteoporosis, Anxiety.    On her initial visit, she was to continue the Zoloft 200mg q HS and Wellbutrin XL 150mg q day was added.  She did not make her FU appointment.    Today, She  states that she feels that the Wellbutrin has been helpful.  She had a sleep study and now has a CPAP.   She states that she is having trouble getting used to the CPAP. She has had it for about 3 months.   When she turns, the CPAP will make a noise and she wakes up and cannot go back to sleep.   She takes two 10mg Melatonin at night.  Believes that she is getting about 6 hours of sleep a night: 3 hours in her bed with the CPA and then 3 hours in her recliner without the CPAP.     She feels tired during the day.   States that if she could get sleep, she would feel better.     She is taking the Zoloft 200mg q HS and the Wellbutrin XL 150mg q day.   States that she is still anxious because she gets short of breath while trying to walk. She fears that she will fall again.     Will add Trazodone 50mg q HS.     PHQ score:  01/30/2023: 8? - mild to moderate  07/27/2022: 16 - moderately severe    NELY-7:  01/30/2023: 6  mild  07/27/2022: 14 - moderate anxiety    Mental Status Evaluation:  Appearance:  casually dressed, neatly groomed, obese, seated in wheelchair   Behavior:  cooperative; restless   Speech:  no latency; no press   Mood:  anxious   Affect:  guarded, mood-congruent   Thought Process:  normal and logical   Thought Content:  normal, no suicidality, no homicidality, delusions, or paranoia   Sensorium:  grossly intact   Cognition:  grossly intact   Insight:  intact   Judgment:  behavior is adequate to circumstances     Impression:  1. Depression - mod severe  2. Anxiety - moderate  3. Difficulty sleeping  4. May need PT    Plan:  1. Continue Zoloft 200mg q HS  2. Continue Wellbutrin XL 150mg q day  3. Start Trazodone 25-50mg as needed for insomnia  4. FU in 6 weeks      Initial Interview 07/27/2022  HPI: 66yo WFreferred by PCP to the [facility] for depression and anxiety  PMHx: HLD, GERD, Osteoporosis, Anxiety.    Patient presents with her .   Patient explains that although she is taking Zoloft 200mg q  HS, she is still anxious and has low energy. She gets discouraged because she cannot do the things that she used to do.   States that she used to be able to do housework but now she does not have the energy. She stopped doing housework 3 months ago.   States that she has been depressed all of her life; since around 8 or 9.   Her mother and father . Her father was an alcoholic. They would argue and she would get nervous. She is still nervous. Worries about everything and every body.     States that she stopped doing things 3 months ago because she has a hard time standing up. Her back gets weak.   She fell and broke her arm about a month ago and had surgery two weeks ago.   One month before breaking her arm, she broke her foot while climbing into the truck. She could not get her leg up high enough to step onto the step to get into the truck. The step broke.    In 2012, she fell and had to have shoulder replacement.     States that she has been taking Zoloft for about 10 years and it has worked well for her in the past.     Has not had a sleep study.   States that she does snore at night. Naps off and on during the day.   Does not feel that she could keep a mask on.   Requested PCP consider a sleep study.     Patient admits that she sits in the house a lot because she fears that she is going to fall. Both times that she fell, she reports that she had difficulty lifting her R leg. She fears that she is going to fall again.   Explained that the more she sits, the weaker she will become.  Strongly encouraged that she requests to go to PT to gain strength and confidence.     Will continue the Zoloft 200mg q HS.  Add Wellbutrin XL 150mg q day   FU in 3 weeks      Psychiatric History:   Reports a history of: depression and anxiety  History of mental health out-patient treatment:  History of in-patient psychiatric hospitalization: denies  History of suicidal ideations: denies  History of suicidal threats:  History  of suicide attempts: denies  History of self mutilation: denies    History of psychotropic medications:   Strattera 40mg q day  Zoloft   Wellbutrin    Family Psychiatric History:  Mental Illness: denies  Alcohol abuse/addiction: father  Drug addiction:    Substance Use History:  Hx of addiction or abuse: denies  Alcohol: denies  Amphetamines: denies  Benzodiazepines: denies  Cocaine: denies  Opiates: recent Rx for Noco after breaking her arm  Marijuana: denies  Tobacco: denies  Caffeine:    Social History:  Grew up in: [place]  Raised by: mother  Number of siblings: one sister and two brothers  Education: 9th grade  Sexual identity: heterosexual  Marital status:  x 51 years  Number of children: 4 adult sons  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    PHQ score:  07/27/2022: 16 - moderately severe  Over the last two weeks how often have you been bothered by little interest or pleasure in doing things Nearly every day   Over the last two weeks how often have you been bothered by feeling down, depressed or hopeless Nearly every day   Over the last two weeks how often have you been bothered by trouble falling or staying asleep, or sleeping too much Nearly every day     Over the last two weeks how often have you been bothered by feeling tired or having little energy Nearly every day   Over the last two weeks how often have you been bothered by a poor appetite or overeating Not at all   Over the last two weeks how often have you been bothered by feeling bad about yourself - or that you are a failure or have let yourself or your family down Several  days   Over the last two weeks how often have you been bothered by trouble concentrating on things, such as reading the newspaper or watching television Not at all   Over the last two weeks how often have you been bothered by moving or speaking so slowly that other people could have noticed. Or the opposite - being so fidgety or restless that you have been moving around a lot more than usual. Nearly every day   Over the last two weeks how often have you been bothered by thoughts that you would be better off dead, or of hurting yourself Not at all   If you checked off any problems, how difficult have these problems made it for you to do your work, take care of things at home or get along with other people? Very difficult   PHQ-9 Score 16   PHQ-9 Interpretation Moderately Severe     NELY-7:  07/27/2022: 14 - moderate anxiety  1. Feeling nervous, anxious, or on edge? Nearly everyday   2. Not being able to stop or control worrying? Nearly everyday   3. Worrying too much about different things? Nearly everyday   4. Trouble relaxing? Nearly everyday   5. Being so restless that it is hard to sit still? Not at all   6. Becoming easily annoyed or irritable? Several days   7. Feeling afraid as if something awful might happen? Several days   8. If you checked off any problems, how difficult have these problems made it for you to do your work, take care of things at home, or get along with other people? Very difficult   NELY-7 Score 14   Number answered (out of first 7) 7   Interpretation Moderate Anxiety     Mental Status Evaluation:  Appearance:  casually dressed, neatly groomed, obese, seated in wheelchair   Behavior:  cooperative; restless   Speech:  no latency; no press   Mood:  anxious   Affect:  guarded, mood-congruent   Thought Process:  normal and logical   Thought Content:  normal, no suicidality, no homicidality, delusions, or paranoia   Sensorium:  grossly intact   Cognition:  grossly intact   Insight:  intact    Judgment:  behavior is adequate to circumstances     Impression:  1. Depression - mod severe  2. Anxiety - moderate  3. Difficulty sleeping  4. May need PT    Plan:  1. Zoloft 200mg q HS  2. Wellbutrin XL 150mg q day  3. Sleep study???  4. FU in 3 weeks

## 2024-09-20 ENCOUNTER — OFFICE VISIT (OUTPATIENT)
Dept: BEHAVIORAL HEALTH | Facility: CLINIC | Age: 69
End: 2024-09-20
Payer: MEDICARE

## 2024-09-20 DIAGNOSIS — G47.9 DIFFICULTY SLEEPING: Chronic | ICD-10-CM

## 2024-09-20 DIAGNOSIS — F32.A MILD DEPRESSION: Primary | Chronic | ICD-10-CM

## 2024-09-20 DIAGNOSIS — F41.9 ANXIETY: Chronic | ICD-10-CM

## 2024-09-20 RX ORDER — TRAZODONE HYDROCHLORIDE 50 MG/1
25 TABLET ORAL NIGHTLY
Qty: 45 TABLET | Refills: 1 | Status: SHIPPED | OUTPATIENT
Start: 2024-09-20

## 2024-09-20 RX ORDER — BUPROPION HYDROCHLORIDE 300 MG/1
300 TABLET ORAL DAILY
Qty: 30 TABLET | Refills: 3 | Status: SHIPPED | OUTPATIENT
Start: 2024-09-20

## 2024-09-20 RX ORDER — SERTRALINE HYDROCHLORIDE 100 MG/1
200 TABLET, FILM COATED ORAL NIGHTLY
Qty: 180 TABLET | Refills: 1 | Status: SHIPPED | OUTPATIENT
Start: 2024-09-20

## 2024-09-20 NOTE — PROGRESS NOTES
This is a virtual visit note. I have reviewed the patient's name verbally reviewed the past medical history, active medication list, and allergy list with the patient and verified with a picture ID if applicable. I have verified that this patient is in the state of [place]. The patient has consented to be treated remotely by telemedicine appointment via [facility] approved interactive audio format. The patient does not have access to a working audiovisualformat. The patient stated that they understood and accepted the privacy and security risks to their information at their location.  Originating site (where the patient is located): Patient Home   Distant site (where the provider is located): [facility]    Time spent with the patient was  10 minutes, over 50% of which was used for education and counseling regarding mental health conditions, current medications including risk/benefit and side effects/adverse events, OTC uses/doses, home self care, and indications for immediate medical attention. The patient is receptive, expresses understanding, and is agreeable to plan. All questions answered.    Follow-up #8  09/20/2024  HPI: 70yo WFreferred by PCP [facility] the [facility]  PMHx: HLD, GERD, Osteoporosis, Anxiety.    On her last visit, patient was doing OK. She states that she hurts all over; her knees and back.   She states that she is taking the Zoloft and Wellbutrin and they are working well. She is sleeping well with the Trazodone and her CPAP. She states that she is still tired throughout the day. She has no energy. Patient has to pay out of pocket for all of her medications, therefore, she is very limited in what she can take. No med changes made.    Today, patient states that she is doing well. She is still taking the Zoloft, Wellbutrin, and Trazodone. She is no longer taking Buspar and denies being particularly anxious.     She denies any  hallucinations.  She states that she is sleeping well.   No med changes.     Follow-up in 6 months - virtual    PHQ score:  09/20/2024: virtual  07/31/2024: virtual  06/19/2024: virtual  06/04/2024: virtual  11/01/2023: virtual  09/19/2023: 10 moderate  03/17/2023: 7 mild  01/30/2023: 8? - mild to moderate  07/27/2022: 16 - moderately severe    NELY-7:  09/20/2024: virtual  07/31/2024: virtual   06/19/2024: virtual  06/04/2024: virtual  11/01/2023: virtual  09/19/2023: 13 moderate  03/17/2023: 13 moderate  01/30/2023: 6  mild  07/27/2022: 14 - moderate anxiety    Mental Status Evaluation:  Appearance:  appropriate   Behavior:  normal, cooperative   Speech:  no latency; no press   Mood:  euthymic   Affect:  mood congruent   Thought Process:  normal and logical   Thought Content:  normal, no suicidality, no homicidality, delusions, or paranoia   Sensorium:  grossly intact   Cognition:  grossly intact   Insight:  intact   Judgment:  behavior is adequate to circumstances      Impression:  1. Depression - mild  2. Anxiety - moderate  3. Difficulty sleeping    Plan:  1. Continue Zoloft 200mg q HS  2. Continue Wellbutrin XL to 300mg q day  3. Continue Trazodone 25-50mg as needed for insomnia  4. Discontinue Buspar 5mg three times a day  5. FU in 6 months - virtual        Follow-up #7  07/31/2024  HPI: 70yo WFreferred by PCP to the [facility] for [facility] and [facility]  PMHx: HLD, GERD, Osteoporosis, Anxiety.    On her last visit, patient was doing OK. She denied feeling terribly depressed.     Today, patient states that she is doing OK. She states that she hurts all over; her knees and back.   She states that she is taking the Zoloft and Wellbutrin and they are working well. She is sleeping well with the Trazodone and her CPAP. She states that she is still tired throughout the day. She has no energy.   Patient has to pay out of pocket for all of her medications, therefore, she is very limited in what she can take.  "    No med changes made.    Follow-up in 6 months - virtual    PHQ score:  07/31/2024: virtual  06/19/2024: virtual  06/04/2024: virtual  11/01/2023: virtual  09/19/2023: 10 moderate  03/17/2023: 7 mild  01/30/2023: 8? - mild to moderate  07/27/2022: 16 - moderately severe    NELY-7:  07/31/2024: virtual   06/19/2024: virtual  06/04/2024: virtual  11/01/2023: virtual  09/19/2023: 13 moderate  03/17/2023: 13 moderate  01/30/2023: 6  mild  07/27/2022: 14 - moderate anxiety    Mental Status Evaluation:  Appearance:  appropriate   Behavior:  normal, cooperative   Speech:  no latency; no press   Mood:  euthymic   Affect:  mood congruent   Thought Process:  normal and logical   Thought Content:  normal, no suicidality, no homicidality, delusions, or paranoia   Sensorium:  grossly intact   Cognition:  grossly intact   Insight:  intact   Judgment:  behavior is adequate to circumstances      Impression:  1. Depression - mild  2. Anxiety - moderate  3. Difficulty sleeping    Plan:  1. Continue Zoloft 200mg q HS  2. Continue Wellbutrin XL to 300mg q day  3. Continue Trazodone 25-50mg as needed for insomnia  4. Continue Buspar 5mg three times a day  5. FU in 6 months - virtual      Follow-up #6  06/19/2024  HPI: 70yo WFreferred by PCP to the [facility] and anxiety  PMHx: HLD, GERD, Osteoporosis, Anxiety.    On her last visit, patient was doing alright. She was more anxious and was not taking the Buspar.   Stated that she wanted to do things but it took all she had to do it (a lack of energy).   Explained that her age and her weight were significant factors and there may not be a medication that can give her the energy that she is looking for. The Wellbutrin XL was increased to 450mg a day but her insurance does not pay for medication.for it and she could not pay out of pocket.     Today, patient states that she is doing OK.   She denies feeling terribly depressed. She states that she is tired; "it goes and " "comes."     Follow-up in 3 months - virtual    PHQ score:  06/19/2024: virtual  06/04/2024: virtual  11/01/2023: virtual  09/19/2023: 10 moderate  03/17/2023: 7 mild  01/30/2023: 8? - mild to moderate  07/27/2022: 16 - moderately severe    NELY-7:  06/19/2024: virtual  06/04/2024: virtual  11/01/2023: virtual  09/19/2023: 13 moderate  03/17/2023: 13 moderate  01/30/2023: 6  mild  07/27/2022: 14 - moderate anxiety    Mental Status Evaluation:  Appearance:  appropriate   Behavior:  normal, cooperative   Speech:  no latency; no press   Mood:  euthymic   Affect:  mood congruent   Thought Process:  normal and logical   Thought Content:  normal, no suicidality, no homicidality, delusions, or paranoia   Sensorium:  grossly intact   Cognition:  grossly intact   Insight:  intact   Judgment:  behavior is adequate to circumstances      Impression:  1. Depression - mild  2. Anxiety - moderate  3. Difficulty sleeping    Plan:  1. Continue Zoloft 200mg q HS  2. Decrease Wellbutrin XL to 300mg q day  3. Continue Trazodone 25-50mg as needed for insomnia  4. Continue Buspar 5mg three times a day  5. FU in 3 months - virtual      Follow-up #5  06/04/2024  HPI: 68yo WFreferred by PCP to the [facility]  PMHx: HLD, GERD, Osteoporosis, Anxiety.    On her last visit, patient was doing well.     Today, patient states that she is doing alright.  Patient states that she is more anxious and is not taking the Buspar.   She states that she wants to do things but it takes all she has to do it (a lack of energy).   She states that when she is invited to go out to see her grandchildren, she does not have the energy to go. She states that she pushes herself to go.   Patient is 68yo and 376lbs. She has to use a walker to ambulate. Her age and her weight are significant factors and there may not be a medication that can give her the energy that she is looking for.   But she also reporting anxiety. She states that there " are so many things that she wants to do and cannot do them. When asked if she was more depressed or anxious, she states that she is more depressed. But again, her age and her weight cannot be treated with medication.     Follow-up in 8 weeks - virtual      PHQ score:  06/04/2024: virtual  11/01/2023: virtual  09/19/2023: 10 moderate  03/17/2023: 7 mild  01/30/2023: 8? - mild to moderate  07/27/2022: 16 - moderately severe    NELY-7:  06/04/2024: virtual  11/01/2023: virtual  09/19/2023: 13 moderate  03/17/2023: 13 moderate  01/30/2023: 6  mild  07/27/2022: 14 - moderate anxiety    Mental Status Evaluation:  Appearance:  appropriate   Behavior:  normal, cooperative   Speech:  no latency; no press   Mood:  euthymic   Affect:  mood congruent   Thought Process:  normal and logical   Thought Content:  normal, no suicidality, no homicidality, delusions, or paranoia   Sensorium:  grossly intact   Cognition:  grossly intact   Insight:  intact   Judgment:  behavior is adequate to circumstances      Impression:  1. Depression - mild  2. Anxiety - moderate  3. Difficulty sleeping    Plan:  1. Continue Zoloft 200mg q HS  2. Increase Wellbutrin XL to 450mg q day  3. Continue Trazodone 25-50mg as needed for insomnia  4. Continue Buspar 5mg three times a day  5. FU in 8 weeks - virtual      Follow-up #4  11/01/2023  HPI: 67yo WFreferred by PCP to the [facility]  PMHx: HLD, GERD, Osteoporosis, Anxiety.    On her last visit, Buspar 5mg three times a day was added to the Zoloft and Wellbutrin.     Today, patient stats that she is doing alright.  She states that she does not notice any great difference. She is taking the Buspar twice a day. Encouraged to take three times a day if needed.   She states that she is not too anxious.     She states that she is sleeping good at night and sleeps some during the day. She is cutting back on the dose of Trazodone.    Follow-up in 3 months - virtual      PHQ  score:  11/01/2023: virtual  09/19/2023: 10 moderate  03/17/2023: 7 mild  01/30/2023: 8? - mild to moderate  07/27/2022: 16 - moderately severe    NELY-7:  11/01/2023: virtual  09/19/2023: 13 moderate  03/17/2023: 13 moderate  01/30/2023: 6  mild  07/27/2022: 14 - moderate anxiety    Mental Status Evaluation:  Appearance:  appropriate   Behavior:  normal, cooperative   Speech:  no latency; no press   Mood:  euthymic   Affect:  mood congruent   Thought Process:  normal and logical   Thought Content:  normal, no suicidality, no homicidality, delusions, or paranoia   Sensorium:  grossly intact   Cognition:  grossly intact   Insight:  intact   Judgment:  behavior is adequate to circumstances        Impression:  1. Depression - mild  2. Anxiety - moderate  3. Difficulty sleeping    Plan:  1. Continue Zoloft 200mg q HS  2. Continue Wellbutrin XL to 300mg q day  3. Continue Trazodone 25-50mg as needed for insomnia  4. Continue Buspar 5mg three times a day  5. FU in 3 months - virtual      Follow-up #3  09/19/2023/2023  HPI: 69yo WFref[facility] by PCP [facility] the [facility]  PMHx: HLD, GERD, Osteoporosis, Anxiety.    Patient did not make her 6 week follow-up after her last visit in March. At that time, the Wellbutrin XL was increased to 300mg a day.    Today she states that she is taking the Zoloft 200mg and the Wellbutrin XL 300mg and that   She states that she is still tired; does not have any energy.   She is still having back pain. She has taken two shots and she has to take one more. She stated that there has been some pain relief when she takes the shots.   She states that she is unable to stand for 5 minutes without getting tired and having increased back pain.     She states that with the combination of the Trazodone and her CPAP, she is sleeping better. She states that she is resting better but does not have any more energy. Once she gets up, she naps in her chair.     Will continue Zoloft  and Wellbutrin XL.  Will add Buspar 5mg TID.  FU in 6 weeks  - virtual    Patient then states that Dr. Conway had given her something to lose weight and that she is taking it: Contrave. She had some left over from her original prescription. She thinks that she has lost about 10lbs.   She states that she wanted to see if it would help her to lose weight.   She states that she stopped taking it after it was prescribed by Dr. Conway because she did not think that it was working.   She has not talked to anyone about weight loss surgery.   She will FU with her PCP later this month.       PHQ score:  09/19/2023: 10 moderate  03/17/2023: 7 mild  01/30/2023: 8? - mild to moderate  07/27/2022: 16 - moderately severe    NELY-7:  09/19/2023: 13 moderate  03/17/2023: 13 moderate  01/30/2023: 6  mild  07/27/2022: 14 - moderate anxiety    Mental Status Evaluation:  Appearance:  Not assessed; telephone visit   Behavior:  normal, cooperative   Speech:  no latency; no press   Mood:  euthymic   Affect:  Not assessed; telephone visit   Thought Process:  normal and logical   Thought Content:  normal, no suicidality, no homicidality, delusions, or paranoia   Sensorium:  grossly intact   Cognition:  grossly intact   Insight:  intact   Judgment:  behavior is adequate to circumstances        Impression:  1. Depression - mild  2. Anxiety - moderate  3. Difficulty sleeping    Plan:  1. Continue Zoloft 200mg q HS  2. Continue Wellbutrin XL to 300mg q day  3. Continue Trazodone 25-50mg as needed for insomnia  4. Add Buspar 5mg three times a day  5. FU in 6 weeks - virtual      Follow-up #2 03/17/2023  HPI: 68yo WFreferred by PCP to the [facility]  PMHx: HLD, GERD, Osteoporosis, Anxiety.    On her last visit, patient had been using a CPAP for about 3 months and was having difficulty getting used to it. She was sleeping about 6 hours but was still not sleeping well. Stated that she feels that she might feel better  if she could sleep better. Trazodone 50mg was added for insomnia.   She was taking Zoloft 200mg q HS and Wellbutrin XL 150mg q day.   Stated that she was still anxious because she gets short of breath while trying to walk; feared that she would fall again.     Today, patient states that she is doing alright.  She states that she is taking Trazodone 25 and is sleeping better.   She is still taking the Zoloft 200mg at HS and Wellbutrin XL 150mg.     She states that she is still tired; has no energy. She would like to try an increase in the Wellbutrin XL. She finds that the Wellbutrin helps to decrease her anxiety. Discussed with patient the an increase in the Wellbutrin may not give her more energy but she would like to try.  Will increase to 300mg q day.     FU in 6 weeks. Patient requests audio call as she has great difficulty getting around.     PHQ score:  03/17/2023: 7 mild  01/30/2023: 8? - mild to moderate  07/27/2022: 16 - moderately severe    NELY-7:  03/17/2023: 13 moderate  01/30/2023: 6  mild  07/27/2022: 14 - moderate anxiety    Mental Status Evaluation:  Appearance:  Not assessed; telephone visit   Behavior:  normal, cooperative   Speech:  no latency; no press   Mood:  euthymic   Affect:  Not assessed; telephone visit   Thought Process:  normal and logical   Thought Content:  normal, no suicidality, no homicidality, delusions, or paranoia   Sensorium:  grossly intact   Cognition:  grossly intact   Insight:  intact   Judgment:  behavior is adequate to circumstances        Impression:  1. Depression - mild  2. Anxiety - moderate  3. Difficulty sleeping    Plan:  1. Continue Zoloft 200mg q HS  2. Increase Wellbutrin XL to 300mg q day  3. Continue Trazodone 25-50mg as needed for insomnia  4. FU in 6 weeks    Follow-up #1  01/30/2023  HPI: 68yo WFreferred by PCP to the [facility] for depression and anxiety  PMHx: HLD, GERD, Osteoporosis, Anxiety.    On her initial visit, she was to continue the Zoloft  200mg q HS and Wellbutrin XL 150mg q day was added.  She did not make her FU appointment.    Today, She states that she feels that the Wellbutrin has been helpful.  She had a sleep study and now has a CPAP.   She states that she is having trouble getting used to the CPAP. She has had it for about 3 months.   When she turns, the CPAP will make a noise and she wakes up and cannot go back to sleep.   She takes two 10mg Melatonin at night.  Believes that she is getting about 6 hours of sleep a night: 3 hours in her bed with the CPA and then 3 hours in her recliner without the CPAP.     She feels tired during the day.   States that if she could get sleep, she would feel better.     She is taking the Zoloft 200mg q HS and the Wellbutrin XL 150mg q day.   States that she is still anxious because she gets short of breath while trying to walk. She fears that she will fall again.     Will add Trazodone 50mg q HS.     PHQ score:  01/30/2023: 8? - mild to moderate  07/27/2022: 16 - moderately severe    NELY-7:  01/30/2023: 6  mild  07/27/2022: 14 - moderate anxiety    Mental Status Evaluation:  Appearance:  casually dressed, neatly groomed, obese, seated in wheelchair   Behavior:  cooperative; restless   Speech:  no latency; no press   Mood:  anxious   Affect:  guarded, mood-congruent   Thought Process:  normal and logical   Thought Content:  normal, no suicidality, no homicidality, delusions, or paranoia   Sensorium:  grossly intact   Cognition:  grossly intact   Insight:  intact   Judgment:  behavior is adequate to circumstances     Impression:  1. Depression - mod severe  2. Anxiety - moderate  3. Difficulty sleeping  4. May need PT    Plan:  1. Continue Zoloft 200mg q HS  2. Continue Wellbutrin XL 150mg q day  3. Start Trazodone 25-50mg as needed for insomnia  4. FU in 6 weeks      Initial Interview 07/27/2022  HPI: 68yo WFreferred by PCP to the [facility] anxiety  PMHx: HLD, GERD, Osteoporosis,  Anxiety.    Patient presents with her .   Patient explains that although she is taking Zoloft 200mg q HS, she is still anxious and has low energy. She gets discouraged because she cannot do the things that she used to do.   States that she used to be able to do housework but now she does not have the energy. She stopped doing housework 3 months ago.   States that she has been depressed all of her life; since around 8 or 9.   Her mother and father . Her father was an alcoholic. They would argue and she would get nervous. She is still nervous. Worries about everything and every body.     States that she stopped doing things 3 months ago because she has a hard time standing up. Her back gets weak.   She fell and broke her arm about a month ago and had surgery two weeks ago.   One month before breaking her arm, she broke her foot while climbing into the truck. She could not get her leg up high enough to step onto the step to get into the truck. The step broke.    In 2012, she fell and had to have shoulder replacement.     States that she has been taking Zoloft for about 10 years and it has worked well for her in the past.     Has not had a sleep study.   States that she does snore at night. Naps off and on during the day.   Does not feel that she could keep a mask on.   Requested PCP consider a sleep study.     Patient admits that she sits in the house a lot because she fears that she is going to fall. Both times that she fell, she reports that she had difficulty lifting her R leg. She fears that she is going to fall again.   Explained that the more she sits, the weaker she will become.  Strongly encouraged that she requests to go to PT to gain strength and confidence.     Will continue the Zoloft 200mg q HS.  Add Wellbutrin XL 150mg q day   FU in 3 weeks      Psychiatric History:   Reports a history of: depression and anxiety  History of mental health out-patient treatment:  History of in-patient  psychiatric hospitalization: denies  History of suicidal ideations: denies  History of suicidal threats:  History of suicide attempts: denies  History of self mutilation: denies    History of psychotropic medications:   Strattera 40mg q day  Zoloft   Wellbutrin    Family Psychiatric History:  Mental Illness: denies  Alcohol abuse/addiction: father  Drug addiction:    Substance Use History:  Hx of addiction or abuse: denies  Alcohol: denies  Amphetamines: denies  Benzodiazepines: denies  Cocaine: denies  Opiates: recent Rx for Noco after breaking her arm  Marijuana: denies  Tobacco: denies  Caffeine:    Social History:  Grew up in: [place]  Raised by: mother  Number of siblings: one sister and two brothers  Education: 9th grade  Sexual identity: heterosexual  Marital status:  x 51 years  Number of children: 4 adult sons  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    PHQ score:  07/27/2022: 16 - moderately severe  Over the last two weeks how often have you been bothered by little interest or pleasure in doing things Nearly every day   Over the last two weeks how often have you been bothered by feeling down, depressed or hopeless Nearly every day   Over the last two weeks how often have you been bothered by trouble falling or staying asleep, or sleeping too much Nearly every day     Over the last two weeks how often have you been bothered by feeling tired or having little energy Nearly every day   Over the last two weeks how often have you been bothered by a poor appetite or overeating Not at all   Over the last two weeks how often have you been  bothered by feeling bad about yourself - or that you are a failure or have let yourself or your family down Several days   Over the last two weeks how often have you been bothered by trouble concentrating on things, such as reading the newspaper or watching television Not at all   Over the last two weeks how often have you been bothered by moving or speaking so slowly that other people could have noticed. Or the opposite - being so fidgety or restless that you have been moving around a lot more than usual. Nearly every day   Over the last two weeks how often have you been bothered by thoughts that you would be better off dead, or of hurting yourself Not at all   If you checked off any problems, how difficult have these problems made it for you to do your work, take care of things at home or get along with other people? Very difficult   PHQ-9 Score 16   PHQ-9 Interpretation Moderately Severe     NELY-7:  07/27/2022: 14 - moderate anxiety  1. Feeling nervous, anxious, or on edge? Nearly everyday   2. Not being able to stop or control worrying? Nearly everyday   3. Worrying too much about different things? Nearly everyday   4. Trouble relaxing? Nearly everyday   5. Being so restless that it is hard to sit still? Not at all   6. Becoming easily annoyed or irritable? Several days   7. Feeling afraid as if something awful might happen? Several days   8. If you checked off any problems, how difficult have these problems made it for you to do your work, take care of things at home, or get along with other people? Very difficult   NELY-7 Score 14   Number answered (out of first 7) 7   Interpretation Moderate Anxiety     Mental Status Evaluation:  Appearance:  casually dressed, neatly groomed, obese, seated in wheelchair   Behavior:  cooperative; restless   Speech:  no latency; no press   Mood:  anxious   Affect:  guarded, mood-congruent   Thought Process:  normal and logical   Thought Content:  normal, no suicidality, no  homicidality, delusions, or paranoia   Sensorium:  grossly intact   Cognition:  grossly intact   Insight:  intact   Judgment:  behavior is adequate to circumstances     Impression:  1. Depression - mod severe  2. Anxiety - moderate  3. Difficulty sleeping  4. May need PT    Plan:  1. Zoloft 200mg q HS  2. Wellbutrin XL 150mg q day  3. Sleep study???  4. FU in 3 weeks

## 2024-10-09 ENCOUNTER — OFFICE VISIT (OUTPATIENT)
Dept: ORTHOPEDICS | Facility: CLINIC | Age: 69
End: 2024-10-09
Payer: MEDICARE

## 2024-10-09 VITALS — HEIGHT: 61 IN | WEIGHT: 293 LBS | BODY MASS INDEX: 55.32 KG/M2

## 2024-10-09 DIAGNOSIS — M17.11 LOCALIZED OSTEOARTHRITIS OF RIGHT KNEE: ICD-10-CM

## 2024-10-09 DIAGNOSIS — M25.561 RIGHT KNEE PAIN, UNSPECIFIED CHRONICITY: ICD-10-CM

## 2024-10-09 DIAGNOSIS — M17.12 PRIMARY OSTEOARTHRITIS OF LEFT KNEE: ICD-10-CM

## 2024-10-09 DIAGNOSIS — M25.562 LEFT KNEE PAIN, UNSPECIFIED CHRONICITY: Primary | ICD-10-CM

## 2024-10-09 RX ADMIN — LIDOCAINE HYDROCHLORIDE 2 MG: 20 INJECTION, SOLUTION INFILTRATION; PERINEURAL at 09:10

## 2024-10-09 RX ADMIN — METHYLPREDNISOLONE ACETATE 80 MG: 80 INJECTION, SUSPENSION INTRA-ARTICULAR; INTRALESIONAL; INTRAMUSCULAR; SOFT TISSUE at 09:10

## 2024-10-09 NOTE — PROGRESS NOTES
"Subjective:    CC: Pain (CARLOS knee pain, wbat, ambulates with Rolator, reports increase in pain, states taking otc tylenol for relief, denies radiating down or up legs, no other complaints, )       HPI:  Patient comes in today complaining of bilateral knee pain.  Patient states she has pain especially when she ambulates, she does walk with a walker.  She has pain with daily activities, occasionally shooting up and down her knees.  She denies any trauma or specific injury, she denies other complaints.  She is presently with family.    ROS: Refer to HPI for pertinent ROS. All other 12 point systems negative.    Objective:  Vitals:    10/09/24 0857   Weight: (!) 160.2 kg (353 lb 2.8 oz)   Height: 5' 1" (1.549 m)        Physical Exam:  The patient is well-nourished, well-developed, in no apparent distress, pleasant and cooperative. Examination of the bilateral lower extremities,  compartments are soft and warm. Skin is intact. There are no signs or symptoms of DVT or infection. There is a small joint effusion. There is no erythema. Tenderness to palpation along the medial and lateral joint line, right knee range of motion is 5-95; left knee range of motion is 0-90. The knee is stable to stressing with varus and valgus. Negative anterior and posterior drawer.   Negative Lachman´s.  Negative Carlos's test. Patella grind is positive, negative for apprehension.  Patient is neurovascularly intact distally.      Images:  X-rays three views left and right knee demonstrates tricompartmental osteoarthritis bilaterally. Images Reviewed and discussed with patient.    Assessment:  1. Left knee pain, unspecified chronicity  - X-Ray Knee 3 View Left; Future    2. Right knee pain, unspecified chronicity  - X-Ray Knee 3 View Right; Future    3. Primary osteoarthritis of left knee  - Large Joint Aspiration/Injection: L knee    4. Localized osteoarthritis of right knee        Plan:  At this time we discussed her physical exam and x-ray " Please follow-up with your primary doctor for reevaluation   findings.  Patient has worsening pain about her left knee.  We have discussed conservative and surgical intervention.  She will continue low-impact activities, knee exercises.  We have discussed her elevated BMI.  She tolerated the steroid injection very well through the anterolateral portal of the left knee.  I would like see back in 4 weeks possible injection of the right knee at that time if needed.    Follow UP: No follow-ups on file.    Large Joint Aspiration/Injection: L knee    Date/Time: 10/9/2024 9:00 AM    Performed by: Shmuel Lai MD  Authorized by: Shmuel Lai MD    Consent Done?:  Yes (Verbal)  Indications:  Pain  Site marked: the procedure site was marked    Prep: patient was prepped and draped in usual sterile fashion    Approach:  Anterolateral  Location:  Knee  Site:  L knee  Medications:  2 mg LIDOcaine HCL 20 mg/ml (2%) 20 mg/mL (2 %); 80 mg methylPREDNISolone acetate 80 mg/mL  Patient tolerance:  Patient tolerated the procedure well with no immediate complications

## 2024-10-09 NOTE — PROCEDURES
Large Joint Aspiration/Injection: L knee    Date/Time: 10/9/2024 9:00 AM    Performed by: Shmuel Lai MD  Authorized by: Shmuel Lai MD    Consent Done?:  Yes (Verbal)  Indications:  Pain  Site marked: the procedure site was marked    Prep: patient was prepped and draped in usual sterile fashion    Approach:  Anterolateral  Location:  Knee  Site:  L knee  Medications:  2 mg LIDOcaine HCL 20 mg/ml (2%) 20 mg/mL (2 %); 80 mg methylPREDNISolone acetate 80 mg/mL  Patient tolerance:  Patient tolerated the procedure well with no immediate complications

## 2024-10-10 RX ORDER — LIDOCAINE HYDROCHLORIDE 20 MG/ML
2 INJECTION, SOLUTION INFILTRATION; PERINEURAL
Status: DISCONTINUED | OUTPATIENT
Start: 2024-10-09 | End: 2024-10-10 | Stop reason: HOSPADM

## 2024-10-10 RX ORDER — METHYLPREDNISOLONE ACETATE 80 MG/ML
80 INJECTION, SUSPENSION INTRA-ARTICULAR; INTRALESIONAL; INTRAMUSCULAR; SOFT TISSUE
Status: DISCONTINUED | OUTPATIENT
Start: 2024-10-09 | End: 2024-10-10 | Stop reason: HOSPADM

## 2024-10-28 NOTE — DISCHARGE SUMMARY
[facility] - Periop Services  Discharge Note  Short Stay    Procedure(s) (LRB):  Injection,steroid,epidural,transforaminal approach (Bilateral)      OUTCOME: Patient tolerated treatment/procedure well without complication and is now ready for discharge.    DISPOSITION: Home or Self Care    FINAL DIAGNOSIS:  <principal problem not specified>    FOLLOWUP: In clinic    DISCHARGE INSTRUCTIONS:  No discharge procedures on file.     TIME SPENT ON DISCHARGE: 5 minutes   Yes

## 2024-11-14 RX ORDER — ROSUVASTATIN CALCIUM 10 MG/1
10 TABLET, COATED ORAL NIGHTLY
Qty: 90 TABLET | Refills: 3 | Status: SHIPPED | OUTPATIENT
Start: 2024-11-14

## 2024-12-20 ENCOUNTER — OFFICE VISIT (OUTPATIENT)
Dept: CARDIOLOGY | Facility: CLINIC | Age: 69
End: 2024-12-20
Payer: MEDICARE

## 2024-12-20 DIAGNOSIS — G47.33 OSA ON CPAP: Chronic | ICD-10-CM

## 2024-12-20 DIAGNOSIS — R06.09 DYSPNEA ON EXERTION: Primary | ICD-10-CM

## 2024-12-20 DIAGNOSIS — E78.5 HYPERLIPIDEMIA LDL GOAL <100: Chronic | ICD-10-CM

## 2024-12-20 DIAGNOSIS — R60.0 BILATERAL LOWER EXTREMITY EDEMA: ICD-10-CM

## 2024-12-20 NOTE — PROGRESS NOTES
This is a telemedicine note. Patient was treated using telemedicine, real time audio and video, according to [facility] protocols. IJose Luis FNP, conducted the visit from the [facility]. The patient participated in the visit at a non-[facility] location selected by the patient, identified below. I am licensed in the state where the patient stated they are located. The patient stated that they understood and accepted the privacy and security risks to their information at their location. This visit is not recorded.    Patient was located at the patient's home.       CHIEF COMPLAINT:   Chief Complaint   Patient presents with    Follow-up virtual visit     Denies any cardiac problems                                                  HPI:  Eleanro Buckner 69 y.o. female Eleanor Buckner 69 y.o. female with a past medical history of Morbid Obesity, KI/CPAP and HLD who presents to cardiology clinic for routine follow up and ongoing care via telemedicine.  Latest Echocardiogram obtained on 12.6.22 revealed a preserved EF of 55% and mild pulmonary hypertension.  Most recent ischemic evaluation includes a Lexiscan Stress Test on 5.13.22 which was abnormal revealing a mild intensity, small sized, fixed perfusion abnormality consistent with scar in the apical wall(s) in the typical distribution of the LAD territory that is currently being medically managed.        The patient presents to clinic today endorsing ongoing symptoms of shortness of breath with exertion.  She denies other cardiovascular complaints of chest pain, palpitations, orthopnea, PND, peripheral edema, claudication, lightheadedness, dizziness, near-syncope or syncope.  She continues to endorse stable bilateral lower extremity edema that is currently well managed with her conservative therapies.  The patient's activity is reportedly limited due to chronic back pain.  She requires assistance with her baseline ADLs but denies  experiencing any chest pain or significant exertional dyspnea.  To note, the patient is careful not to overexert herself.  She remains compliant with her current medications and endorses nightly compliance with CPAP.    CARDIAC TESTING:  Echo 12.6.22     Interpretation Summary  · Mild tricuspid regurgitation.  · The left ventricle is normal in size with normal systolic function.  · The estimated ejection fraction is 55%.  · Normal right ventricular size with normal right ventricular systolic function.  · Mild left atrial enlargement.  · There is mild pulmonary hypertension.     RLE Venous Insuffiencey Study 10.3.22:  There is no evidence of a right lower extremity DVT.  There was no substantial reflux identified in the visualized veins of the right leg deep system, GSV, saphenofemoral junction, or SSV.     LLE Venous Insuffiencey Study 9.29.22:                                                                                                                                                                         There was a thin linear echogenic filling defect in the femoral vein at the level of the mid thigh. This finding could represent a non-occlusive chronic deep vein thrombus. The femoral vein appears patent and compressible where visualized.   There was no substantial reflux identified in the visualized veins of the left leg deep system, GSV, saphenofemoral junction, or SSV.     Nuclear Stress Test 5.13.22:  Abnormal myocardial perfusion scan.  There is a mild intensity, small sized, fixed perfusion abnormality consistent with scar in the apical wall(s) in the typical distribution of the LAD territory.  There are no other significant perfusion abnormalities.  The gated perfusion images showed an ejection fraction of 64% post stress.  The patient reported no chest pain during the stress test.     Echocardiogram November 2021:  Left ventricular ejection fraction is measured at approximately 55%.  Structurally  normal mitral valve.  Structurally trileaflet aortic valve.  No evidence of tricuspid regurgitation.  No evidence of pulmonic regurgitation.  Mildly dilated left atrium.  Normal right atrial size.  Right ventricular cavity is normal in size.  No evidence of a pericardial effusion.  No evidence of pleural effusion.  IVC is normal.    LINDY testing November 2021:  No evidence of significant arterial insufficiency was identified on the study.    Lexiscan 9.19.19:  EF 59% with no wall motion abnormalities  No ischemia, inferior scar                                                                                                                                                                                        TTE 7.13.19:  Left ventricular ejection fraction measured approximately 55%  Moderate dilated left atrium  There is mild tricuspid regurgitation with estimated RVSP of 36 mmHg  Trace pulmonic regurgitation present                                                                                                                                                                                                                                                                                                                                                                                                                                                                                            Patient Active Problem List   Diagnosis    Osteoporosis    GERD (gastroesophageal reflux disease)    Hyperlipidemia LDL goal <100    Anxiety    Dyspnea on exertion    Class 3 severe obesity due to excess calories with serious comorbidity and body mass index (BMI) greater than or equal to 70 in adult    Closed fracture of right distal radius    Peripheral edema    Muscle weakness (generalized)    Bilateral chronic knee pain    Unsteadiness on feet    Multiple falls    Vitamin D deficiency    KI on CPAP    Bilateral lower extremity edema     Impaired fasting glucose    Low back pain with sciatica    Mild depression    Difficulty sleeping     Past Surgical History:   Procedure Laterality Date    APPENDECTOMY      CHOLECYSTECTOMY      COLONOSCOPY  01/13/2020    ESOPHAGOGASTRODUODENOSCOPY  06/10/2016    INJECTION OF ANESTHETIC AGENT AROUND MEDIAL BRANCH NERVES INNERVATING LUMBAR FACET JOINT Right 02/22/2023    Procedure: Block-nerve-medial branch-lumbar;  Surgeon: Cassi Muñoz MD;  Location: [facility] OR;  Service: Pain Management;  Laterality: Right;  L4/L5 and L5/S1    JOINT REPLACEMENT Right 03-    total shoulder    OPEN REDUCTION AND INTERNAL FIXATION (ORIF) OF FRACTURE OF DISTAL RADIUS Right 07/14/2022    Procedure: ORIF, FRACTURE, RADIUS, DISTAL;  Surgeon: Emery Morel MD;  Location: [facility];  Service: Orthopedics;  Laterality: Right;    PROCTOSCOPY  03/23/2016    TRANSFORAMINAL EPIDURAL INJECTION OF STEROID Right 04/05/2023    Procedure: Injection,steroid,epidural,transforaminal approach;  Surgeon: Cassi Muñoz MD;  Location: [facility];  Service: Pain Management;  Laterality: Right;  TFESI on Right at L3/L4    TRANSFORAMINAL EPIDURAL INJECTION OF STEROID Bilateral 05/17/2023    Procedure: Injection,steroid,epidural,transforaminal approach;  Surgeon: Cassi Muñoz MD;  Location: [facility] OR;  Service: Pain Management;  Laterality: Bilateral;  Transforaminal Epidural Steroid Injection  Bilateral L4    TRANSFORAMINAL EPIDURAL INJECTION OF STEROID Bilateral 11/15/2023    Procedure: Injection,steroid,epidural,transforaminal approach;  Surgeon: Cassi Muñoz MD;  Location: [facility] OR;  Service: Pain Management;  Laterality: Bilateral;  Bilateral TFESI L4     Social History     Socioeconomic History    Marital status:    Tobacco Use    Smoking status: Never    Smokeless tobacco: Never    Tobacco comments:     never   Substance and Sexual Activity    Alcohol use: Never    Drug use: Never    Sexual activity: Not Currently     Partners:  Female, Male     Birth control/protection: Partner-Vasectomy     Social Drivers of Health     Financial Resource Strain: Medium Risk (1/10/2024)    Overall Financial Resource Strain (CARDIA)     Difficulty of Paying Living Expenses: Somewhat hard   Food Insecurity: Food Insecurity Present (1/10/2024)    Hunger Vital Sign     Worried About Running Out of Food in the Last Year: Sometimes true     Ran Out of Food in the Last Year: Sometimes true   Transportation Needs: No Transportation Needs (1/10/2024)    PRAPARE - Transportation     Lack of Transportation (Medical): No     Lack of Transportation (Non-Medical): No   Physical Activity: Unknown (1/10/2024)    Exercise Vital Sign     Days of Exercise per Week: 0 days   Stress: Stress Concern Present (1/10/2024)    [employer] - Occupational Stress Questionnaire     Feeling of Stress : Very much   Housing Stability: Low Risk  (1/10/2024)    Housing Stability Vital Sign     Unable to Pay for Housing in the Last Year: No     Number of Places Lived in the Last Year: 0     Unstable Housing in the Last Year: No        Family History   Problem Relation Name Age of Onset    Heart failure Mother Haritha whitlock     Heart disease Mother Haritha whitlock     Asthma Mother Haritha whitlock     Depression Mother Haritha whitlock     Osteoporosis Sister      Osteoporosis Brother      Gout Brother      Arthritis Father Anabel Ma      Review of patient's allergies indicates:   Allergen Reactions    Nsaids (non-steroidal anti-inflammatory drug) Nausea And Vomiting         ROS:  Review of Systems   Respiratory:  Positive for shortness of breath.    Cardiovascular:  Negative for chest pain.                                                                                                                                                                                Negative except as stated in the history of present illness. See HPI for details.    PHYSICAL  EXAM:  There were no vitals taken for this visit.    Physical Exam  Physical Exam: LIMITED DUE TO TELEMEDICINE RESTRICTIONS.  General: Alert and oriented, No acute distress.  Head: Normocephalic, Atraumatic.  Eye: Sclera non-icteric.  Neck/Thyroid:  Full range of motion.  Respiratory: Non-labored respirations, Symmetrical chest wall expansion.  Musculoskeletal: Normal range of motion.  Integumentary:  No visible suspicious lesions or rashes. No diaphoresis.   Neurologic: No focal deficits  Psychiatric: Normal interaction, Coherent speech, Euthymic mood, Appropriate affect     Current Outpatient Medications   Medication Instructions    acetaminophen (TYLENOL) 1,000 mg, Nightly    alendronate (FOSAMAX) 70 mg, Oral, Weekly    baclofen (LIORESAL) 10 mg, Oral, 3 times daily    budesonide (RINOCORT AQUA) 32 mcg/actuation nasal spray 1 spray, As needed (PRN)    buPROPion (WELLBUTRIN XL) 300 mg, Oral, Daily    docusate sodium 100 mg, Daily PRN    ergocalciferol (ERGOCALCIFEROL) 50,000 Units, Oral, Every 7 days    esomeprazole (NEXIUM) 40 mg, Oral, Before breakfast    fluticasone propionate (FLONASE) 50 mcg/actuation nasal spray 2 sprays, As needed (PRN)    folic acid (FOLVITE) 1 mg, Daily    furosemide (LASIX) 20 mg, Oral, Daily PRN    hydrocortisone 2.5 % cream Topical (Top), 2 times daily    hydrOXYzine HCL (ATARAX) 25 mg, Oral, 3 times daily PRN    loratadine (CLARITIN) 10 mg, Oral, Nightly    potassium chloride (KLOR-CON) 10 MEQ TbSR 10 mEq, Oral, Daily PRN    pramipexole (MIRAPEX) 0.5 mg, Oral, 2 times daily    rosuvastatin (CRESTOR) 10 mg, Oral, Nightly    sertraline (ZOLOFT) 200 mg, Oral, Nightly    traZODone (DESYREL) 25 mg, Oral, Nightly    VENTOLIN HFA 90 mcg/actuation inhaler 2 puffs, Inhalation, Every 6 hours PRN        All medications, laboratory studies, cardiac diagnostic imaging reviewed.     Lab Results   Component Value Date    LDL 91.00 06/26/2024    .00 (H) 01/13/2024    TRIG 148 (H) 06/26/2024     TRIG 139 01/13/2024    CREATININE 0.93 06/26/2024    K 4.7 06/26/2024        ASSESSMENT/PLAN:  Abnormal Nuclear Stress Test   - Mild intensity, small sized, fixed perfusion abnormality consistent with scar in the apical wall(s) in the typical distribution of the LAD territory (5.13.22).  Currently being medically managed  - Denies any CP. Reports stable shortness of breath with exertion.  ADLs unchanged  - Continue current medication management with Crestor. No longer taking ranolazine  - Instructed patient to notify clinic of any anginal/increase anginal equivalent symptoms.  Will plan to maximize antianginals and/or repeat ischemic evaluation at this point  - Strict ED precautions provided      CANO - ongoing   - EF - 55%, mild tricuspid regurgitation, mild pulmonary hypertension per ECHO 12.6.22  - EF 55% per Echo 11.2021  - EF 55% per Echo 7.3.19  - Lexiscan Stress Test 5.13.22 showed  a mild intensity, small sized, fixed perfusion abnormality consistent with scar in the apical wall(s) in the typical distribution of the LAD territory   - Lexiscan stress test - negative for ischemia with no wall motion abnormalities (9.19.19)  - PFTs - normal  - symptoms likely multifactorial.  Counseled patient on weight loss and increased activity as tolerated. Has back limitations  - Will obtain Echocardiogram to assess LV function and continued monitoring of valves given ongoing exertional dyspnea    HLD  - LDL at goal -91 <100  - Continue Crestor 10mg daily   - Counseled on low-cholesterol, low-fat diet, and exercise as tolerated    Peripheral edema- stable   - RLE venous study on 10.3.22 showed no DVT and no substantial reflux study  - LLE venous study on 9.29.22 showed possible non-occlusive deep vein thrombus but no substantial reflux  - LINDY testing Nov. 2021 - No evidence of significant arterial insufficiency was identified on the study  - Continue Lasix to 20mg PO Qd prn and compression pumps   - Follow up with [facility], Dr.  Lodha prn     Morbid obesity  - Counseled patient on weight loss, heart healthy diet, and increased activity as tolerated     KI/CPAP  - Reports nightly compliance with CPAP  - Recommend continued nightly compliance        ECHO   Follow up in Cardiology Clinic in 6 months or sooner pending results    Follow up with PCP as directed            There are no diagnoses linked to this encounter.      Follow up in about 6 months (around 6/20/2025). In addition to their scheduled follow up, the patient has also been instructed to follow up on as needed basis.     Future Appointments   Date Time Provider Department Center   3/20/2025  8:00 AM Eleanor Anderson APRN [facility]        Video Time Documentation:  Spent 10 minutes with patient face to face discussed health concerns. More than 50% of this time was spent in counseling and coordination of care.    FRANCIS Salcedo

## 2024-12-20 NOTE — PATIENT INSTRUCTIONS
ECHO   Follow up in Cardiology Clinic in 6 months or sooner pending results    Follow up with PCP as directed    Informed patient he doesn't have any labs that need to be done. Patient verbalized understanding.

## 2025-02-10 DIAGNOSIS — G47.9 DIFFICULTY SLEEPING: Chronic | ICD-10-CM

## 2025-02-10 DIAGNOSIS — E55.9 VITAMIN D DEFICIENCY: Primary | Chronic | ICD-10-CM

## 2025-02-10 DIAGNOSIS — F32.A MILD DEPRESSION: Chronic | ICD-10-CM

## 2025-02-10 DIAGNOSIS — M81.0 AGE-RELATED OSTEOPOROSIS WITHOUT CURRENT PATHOLOGICAL FRACTURE: Primary | Chronic | ICD-10-CM

## 2025-02-10 DIAGNOSIS — G25.81 RESTLESS LEG SYNDROME: Primary | ICD-10-CM

## 2025-02-10 RX ORDER — BUPROPION HYDROCHLORIDE 300 MG/1
300 TABLET ORAL DAILY
Qty: 30 TABLET | Refills: 3 | Status: SHIPPED | OUTPATIENT
Start: 2025-02-10

## 2025-02-10 RX ORDER — TRAZODONE HYDROCHLORIDE 50 MG/1
25 TABLET ORAL NIGHTLY
Qty: 45 TABLET | Refills: 1 | Status: SHIPPED | OUTPATIENT
Start: 2025-02-10

## 2025-02-10 NOTE — TELEPHONE ENCOUNTER
Please see attached refill request.    Next scheduled office visit: 2/10/2025.    Last office visit: 9/20/2024.     Thank you!

## 2025-02-10 NOTE — TELEPHONE ENCOUNTER
Please see attached refill request.    Next scheduled office visit: 3/20/2025.    Last office visit: 9/20/2024.     Thank you!

## 2025-02-13 RX ORDER — ERGOCALCIFEROL 1.25 MG/1
50000 CAPSULE ORAL
Qty: 12 CAPSULE | Refills: 2 | Status: SHIPPED | OUTPATIENT
Start: 2025-02-13 | End: 2026-02-13

## 2025-02-13 RX ORDER — ALENDRONATE SODIUM 70 MG/1
70 TABLET ORAL WEEKLY
Qty: 12 TABLET | Refills: 2 | Status: SHIPPED | OUTPATIENT
Start: 2025-02-13 | End: 2026-02-13

## 2025-02-13 RX ORDER — PRAMIPEXOLE DIHYDROCHLORIDE 0.5 MG/1
0.5 TABLET ORAL 2 TIMES DAILY
Qty: 180 TABLET | Refills: 1 | Status: SHIPPED | OUTPATIENT
Start: 2025-02-13 | End: 2026-02-13

## 2025-03-20 ENCOUNTER — OFFICE VISIT (OUTPATIENT)
Dept: BEHAVIORAL HEALTH | Facility: CLINIC | Age: 70
End: 2025-03-20
Payer: MEDICARE

## 2025-03-20 DIAGNOSIS — F32.A MILD DEPRESSION: Primary | Chronic | ICD-10-CM

## 2025-03-20 DIAGNOSIS — G47.9 DIFFICULTY SLEEPING: Chronic | ICD-10-CM

## 2025-03-20 DIAGNOSIS — F51.04 PSYCHOPHYSIOLOGICAL INSOMNIA: Chronic | ICD-10-CM

## 2025-03-20 DIAGNOSIS — F41.9 ANXIETY: Chronic | ICD-10-CM

## 2025-03-20 RX ORDER — SERTRALINE HYDROCHLORIDE 100 MG/1
200 TABLET, FILM COATED ORAL NIGHTLY
Qty: 180 TABLET | Refills: 1 | Status: SHIPPED | OUTPATIENT
Start: 2025-03-20

## 2025-03-20 RX ORDER — BUPROPION HYDROCHLORIDE 150 MG/1
150 TABLET ORAL DAILY
Qty: 30 TABLET | Refills: 3 | Status: SHIPPED | OUTPATIENT
Start: 2025-03-20

## 2025-03-20 RX ORDER — TRAZODONE HYDROCHLORIDE 50 MG/1
25 TABLET ORAL NIGHTLY
Qty: 45 TABLET | Refills: 1 | Status: SHIPPED | OUTPATIENT
Start: 2025-03-20

## 2025-03-20 NOTE — PROGRESS NOTES
This is a virtual visit note. I have reviewed the patient's name verbally reviewed the past medical history, active medication list, and allergy list with the patient and verified with a picture ID if applicable. I have verified that this patient is in the state of [place]. The patient has consented to be treated remotely by telemedicine appointment via [facility] approved interactive audio format. The patient does not have access to a working audiovisualformat. The patient stated that they understood and accepted the privacy and security risks to their information at their location.  Originating site (where the patient is located): Patient Home   Distant site (where the provider is located): [facility]    Time spent with the patient was  10 minutes, over 50% of which was used for education and counseling regarding mental health conditions, current medications including risk/benefit and side effects/adverse events, OTC uses/doses, home self care, and indications for immediate medical attention. The patient is receptive, expresses understanding, and is agreeable to plan. All questions answered.    Follow-up #9  03/20/2025  HPI: 70yo WFreferred by PCP [facility]  PMHx: HLD, GERD, Osteoporosis, Anxiety.    Last visit: patient states that she is doing well. She is still taking the Zoloft, Wellbutrin, and Trazodone. She is no longer taking Buspar and denies being particularly anxious.   She denies any hallucinations.She states that she is sleeping well.   No med changes.     This visit: Patient states that she is doing well.   She is taking Trazodone 50mg at HS and she also wears a CPAP and is sleeping well. She states that she gets sleepy around 9am and she takes a nap.   She cut down her Zoloft to 150mg because of cost; wanting to make it last longer. She is feeling OK.  She is having difficulty affording her medications and is interested in cutting back  on the Wellbutrin also. Will decrease to 150mg a day    Follow-up in 6 months - in clinic    PHQ score:  03/20/2025: virtual  09/20/2024: virtual  07/31/2024: virtual  06/19/2024: virtual  06/04/2024: virtual  11/01/2023: virtual  09/19/2023: 10 moderate  03/17/2023: 7 mild  01/30/2023: 8? - mild to moderate  07/27/2022: 16 - moderately severe    NELY-7:  03/20/2025: virtual  09/20/2024: virtual  07/31/2024: virtual   06/19/2024: virtual  06/04/2024: virtual  11/01/2023: virtual  09/19/2023: 13 moderate  03/17/2023: 13 moderate  01/30/2023: 6  mild  07/27/2022: 14 - moderate anxiety    Mental Status Evaluation:  Appearance:  appropriate   Behavior:  normal, cooperative   Speech:  no latency; no press   Mood:  euthymic   Affect:  mood congruent   Thought Process:  normal and logical   Thought Content:  normal, no suicidality, no homicidality, delusions, or paranoia   Sensorium:  grossly intact   Cognition:  grossly intact   Insight:  intact   Judgment:  behavior is adequate to circumstances      Impression:  1. Depression - mild  2. Anxiety - moderate  3. Difficulty sleeping    Plan:  1. Continue Zoloft 150mg q HS  2. Continue Wellbutrin XL 150mg q day  3. Continue Trazodone 50mg as needed for insomnia  4. FU in 6 months - virtual    Follow-up #8  09/20/2024  HPI: 70yo WFreferred by PCP [facility] the [facility]  PMHx: HLD, GERD, Osteoporosis, Anxiety.    On her last visit, patient was doing OK. She states that she hurts all over; her knees and back.   She states that she is taking the Zoloft and Wellbutrin and they are working well. She is sleeping well with the Trazodone and her CPAP. She states that she is still tired throughout the day. She has no energy. Patient has to pay out of pocket for all of her medications, therefore, she is very limited in what she can take. No med changes made.    Today, patient states that she is doing well. She is still taking the Zoloft, Wellbutrin, and  Trazodone. She is no longer taking Buspar and denies being particularly anxious.     She denies any hallucinations.  She states that she is sleeping well.   No med changes.     Follow-up in 6 months - virtual    PHQ score:  09/20/2024: virtual  07/31/2024: virtual  06/19/2024: virtual  06/04/2024: virtual  11/01/2023: virtual  09/19/2023: 10 moderate  03/17/2023: 7 mild  01/30/2023: 8? - mild to moderate  07/27/2022: 16 - moderately severe    NELY-7:  09/20/2024: virtual  07/31/2024: virtual   06/19/2024: virtual  06/04/2024: virtual  11/01/2023: virtual  09/19/2023: 13 moderate  03/17/2023: 13 moderate  01/30/2023: 6  mild  07/27/2022: 14 - moderate anxiety    Mental Status Evaluation:  Appearance:  appropriate   Behavior:  normal, cooperative   Speech:  no latency; no press   Mood:  euthymic   Affect:  mood congruent   Thought Process:  normal and logical   Thought Content:  normal, no suicidality, no homicidality, delusions, or paranoia   Sensorium:  grossly intact   Cognition:  grossly intact   Insight:  intact   Judgment:  behavior is adequate to circumstances      Impression:  1. Depression - mild  2. Anxiety - moderate  3. Difficulty sleeping    Plan:  1. Continue Zoloft 200mg q HS  2. Continue Wellbutrin XL to 300mg q day  3. Continue Trazodone 25-50mg as needed for insomnia  4. Discontinue Buspar 5mg three times a day  5. FU in 6 months - virtual    Follow-up #7  07/31/2024  HPI: 68yo WFreferred by PCP to the [facility]  PMHx: HLD, GERD, Osteoporosis, Anxiety.    On her last visit, patient was doing OK. She denied feeling terribly depressed.     Today, patient states that she is doing OK. She states that she hurts all over; her knees and back.   She states that she is taking the Zoloft and Wellbutrin and they are working well. She is sleeping well with the Trazodone and her CPAP. She states that she is still tired throughout the day. She has no energy.   Patient has to pay  out of pocket for all of her medications, therefore, she is very limited in what she can take.     No med changes made.    Follow-up in 6 months - virtual    PHQ score:  07/31/2024: virtual  06/19/2024: virtual  06/04/2024: virtual  11/01/2023: virtual  09/19/2023: 10 moderate  03/17/2023: 7 mild  01/30/2023: 8? - mild to moderate  07/27/2022: 16 - moderately severe    NELY-7:  07/31/2024: virtual   06/19/2024: virtual  06/04/2024: virtual  11/01/2023: virtual  09/19/2023: 13 moderate  03/17/2023: 13 moderate  01/30/2023: 6  mild  07/27/2022: 14 - moderate anxiety    Mental Status Evaluation:  Appearance:  appropriate   Behavior:  normal, cooperative   Speech:  no latency; no press   Mood:  euthymic   Affect:  mood congruent   Thought Process:  normal and logical   Thought Content:  normal, no suicidality, no homicidality, delusions, or paranoia   Sensorium:  grossly intact   Cognition:  grossly intact   Insight:  intact   Judgment:  behavior is adequate to circumstances      Impression:  1. Depression - mild  2. Anxiety - moderate  3. Difficulty sleeping    Plan:  1. Continue Zoloft 200mg q HS  2. Continue Wellbutrin XL to 300mg q day  3. Continue Trazodone 25-50mg as needed for insomnia  4. Continue Buspar 5mg three times a day  5. FU in 6 months - virtual      Follow-up #6  06/19/2024  HPI: 70yo WFreferred by PCP to the [facility] for depression and anxiety  PMHx: HLD, GERD, Osteoporosis, Anxiety.    On her last visit, patient was doing alright. She was more anxious and was not taking the Buspar.   Stated that she wanted to do things but it took all she had to do it (a lack of energy).   Explained that her age and her weight were significant factors and there may not be a medication that can give her the energy that she is looking for. The Wellbutrin XL was increased to 450mg a day but her insurance does not pay for medication.for it and she could not pay out of pocket.     Today, patient states that she is  "doing OK.   She denies feeling terribly depressed. She states that she is tired; "it goes and comes."     Follow-up in 3 months - virtual    PHQ score:  06/19/2024: virtual  06/04/2024: virtual  11/01/2023: virtual  09/19/2023: 10 moderate  03/17/2023: 7 mild  01/30/2023: 8? - mild to moderate  07/27/2022: 16 - moderately severe    NELY-7:  06/19/2024: virtual  06/04/2024: virtual  11/01/2023: virtual  09/19/2023: 13 moderate  03/17/2023: 13 moderate  01/30/2023: 6  mild  07/27/2022: 14 - moderate anxiety    Mental Status Evaluation:  Appearance:  appropriate   Behavior:  normal, cooperative   Speech:  no latency; no press   Mood:  euthymic   Affect:  mood congruent   Thought Process:  normal and logical   Thought Content:  normal, no suicidality, no homicidality, delusions, or paranoia   Sensorium:  grossly intact   Cognition:  grossly intact   Insight:  intact   Judgment:  behavior is adequate to circumstances      Impression:  1. Depression - mild  2. Anxiety - moderate  3. Difficulty sleeping    Plan:  1. Continue Zoloft 200mg q HS  2. Decrease Wellbutrin XL to 300mg q day  3. Continue Trazodone 25-50mg as needed for insomnia  4. Continue Buspar 5mg three times a day  5. FU in 3 months - virtual      Follow-up #5  06/04/2024  HPI: 68yo WFreferred by PCP to the [facility] for depression and anxiety  PMHx: HLD, GERD, Osteoporosis, Anxiety.    On her last visit, patient was doing well.     Today, patient states that she is doing alright.  Patient states that she is more anxious and is not taking the Buspar.   She states that she wants to do things but it takes all she has to do it (a lack of energy).   She states that when she is invited to go out to see her grandchildren, she does not have the energy to go. She states that she pushes herself to go.   Patient is 68yo and 376lbs. She has to use a walker to ambulate. Her age and her weight are significant factors and there may not be a medication that can give " her the energy that she is looking for.   But she also reporting anxiety. She states that there are so many things that she wants to do and cannot do them. When asked if she was more depressed or anxious, she states that she is more depressed. But again, her age and her weight cannot be treated with medication.     Follow-up in 8 weeks - virtual      PHQ score:  06/04/2024: virtual  11/01/2023: virtual  09/19/2023: 10 moderate  03/17/2023: 7 mild  01/30/2023: 8? - mild to moderate  07/27/2022: 16 - moderately severe    NELY-7:  06/04/2024: virtual  11/01/2023: virtual  09/19/2023: 13 moderate  03/17/2023: 13 moderate  01/30/2023: 6  mild  07/27/2022: 14 - moderate anxiety    Mental Status Evaluation:  Appearance:  appropriate   Behavior:  normal, cooperative   Speech:  no latency; no press   Mood:  euthymic   Affect:  mood congruent   Thought Process:  normal and logical   Thought Content:  normal, no suicidality, no homicidality, delusions, or paranoia   Sensorium:  grossly intact   Cognition:  grossly intact   Insight:  intact   Judgment:  behavior is adequate to circumstances      Impression:  1. Depression - mild  2. Anxiety - moderate  3. Difficulty sleeping    Plan:  1. Continue Zoloft 200mg q HS  2. Increase Wellbutrin XL to 450mg q day  3. Continue Trazodone 25-50mg as needed for insomnia  4. Continue Buspar 5mg three times a day  5. FU in 8 weeks - virtual      Follow-up #4  11/01/2023  HPI: 67yo WFreferred by PCP to the [facility] for depression and anxiety  PMHx: HLD, GERD, Osteoporosis, Anxiety.    On her last visit, Buspar 5mg three times a day was added to the Zoloft and Wellbutrin.     Today, patient stats that she is doing alright.  She states that she does not notice any great difference. She is taking the Buspar twice a day. Encouraged to take three times a day if needed.   She states that she is not too anxious.     She states that she is sleeping good at night and sleeps some during the day.  She is cutting back on the dose of Trazodone.    Follow-up in 3 months - virtual      PHQ score:  11/01/2023: virtual  09/19/2023: 10 moderate  03/17/2023: 7 mild  01/30/2023: 8? - mild to moderate  07/27/2022: 16 - moderately severe    NELY-7:  11/01/2023: virtual  09/19/2023: 13 moderate  03/17/2023: 13 moderate  01/30/2023: 6  mild  07/27/2022: 14 - moderate anxiety    Mental Status Evaluation:  Appearance:  appropriate   Behavior:  normal, cooperative   Speech:  no latency; no press   Mood:  euthymic   Affect:  mood congruent   Thought Process:  normal and logical   Thought Content:  normal, no suicidality, no homicidality, delusions, or paranoia   Sensorium:  grossly intact   Cognition:  grossly intact   Insight:  intact   Judgment:  behavior is adequate to circumstances        Impression:  1. Depression - mild  2. Anxiety - moderate  3. Difficulty sleeping    Plan:  1. Continue Zoloft 200mg q HS  2. Continue Wellbutrin XL to 300mg q day  3. Continue Trazodone 25-50mg as needed for insomnia  4. Continue Buspar 5mg three times a day  5. FU in 3 months - virtual      Follow-up #3  09/19/2023/2023  HPI: 67yo WFreferred by PCP to the [facility] for depression and anxiety  PMHx: HLD, GERD, Osteoporosis, Anxiety.    Patient did not make her 6 week follow-up after her last visit in March. At that time, the Wellbutrin XL was increased to 300mg a day.    Today she states that she is taking the Zoloft 200mg and the Wellbutrin XL 300mg and that   She states that she is still tired; does not have any energy.   She is still having back pain. She has taken two shots and she has to take one more. She stated that there has been some pain relief when she takes the shots.   She states that she is unable to stand for 5 minutes without getting tired and having increased back pain.     She states that with the combination of the Trazodone and her CPAP, she is sleeping better. She states that she is resting better but does not  have any more energy. Once she gets up, she naps in her chair.     Will continue Zoloft and Wellbutrin XL.  Will add Buspar 5mg TID.  FU in 6 weeks  - virtual    Patient then states that Dr. Conway had given her something to lose weight and that she is taking it: Contrave. She had some left over from her original prescription. She thinks that she has lost about 10lbs.   She states that she wanted to see if it would help her to lose weight.   She states that she stopped taking it after it was prescribed by Dr. Conway because she did not think that it was working.   She has not talked to anyone about weight loss surgery.   She will FU with her PCP later this month.       PHQ score:  09/19/2023: 10 moderate  03/17/2023: 7 mild  01/30/2023: 8? - mild to moderate  07/27/2022: 16 - moderately severe    NELY-7:  09/19/2023: 13 moderate  03/17/2023: 13 moderate  01/30/2023: 6  mild  07/27/2022: 14 - moderate anxiety    Mental Status Evaluation:  Appearance:  Not assessed; telephone visit   Behavior:  normal, cooperative   Speech:  no latency; no press   Mood:  euthymic   Affect:  Not assessed; telephone visit   Thought Process:  normal and logical   Thought Content:  normal, no suicidality, no homicidality, delusions, or paranoia   Sensorium:  grossly intact   Cognition:  grossly intact   Insight:  intact   Judgment:  behavior is adequate to circumstances        Impression:  1. Depression - mild  2. Anxiety - moderate  3. Difficulty sleeping    Plan:  1. Continue Zoloft 200mg q HS  2. Continue Wellbutrin XL to 300mg q day  3. Continue Trazodone 25-50mg as needed for insomnia  4. Add Buspar 5mg three times a day  5. FU in 6 weeks - virtual      Follow-up #2 03/17/2023  HPI: 68yo WFref[facility] PCP [facility] the [facility]  PMHx: HLD, GERD, Osteoporosis, Anxiety.    On her last visit, patient had been using a CPAP for about 3 months and was having difficulty getting used to it. She was sleeping about 6  hours but was still not sleeping well. Stated that she feels that she might feel better if she could sleep better. Trazodone 50mg was added for insomnia.   She was taking Zoloft 200mg q HS and Wellbutrin XL 150mg q day.   Stated that she was still anxious because she gets short of breath while trying to walk; feared that she would fall again.     Today, patient states that she is doing alright.  She states that she is taking Trazodone 25 and is sleeping better.   She is still taking the Zoloft 200mg at HS and Wellbutrin XL 150mg.     She states that she is still tired; has no energy. She would like to try an increase in the Wellbutrin XL. She finds that the Wellbutrin helps to decrease her anxiety. Discussed with patient the an increase in the Wellbutrin may not give her more energy but she would like to try.  Will increase to 300mg q day.     FU in 6 weeks. Patient requests audio call as she has great difficulty getting around.     PHQ score:  03/17/2023: 7 mild  01/30/2023: 8? - mild to moderate  07/27/2022: 16 - moderately severe    NELY-7:  03/17/2023: 13 moderate  01/30/2023: 6  mild  07/27/2022: 14 - moderate anxiety    Mental Status Evaluation:  Appearance:  Not assessed; telephone visit   Behavior:  normal, cooperative   Speech:  no latency; no press   Mood:  euthymic   Affect:  Not assessed; telephone visit   Thought Process:  normal and logical   Thought Content:  normal, no suicidality, no homicidality, delusions, or paranoia   Sensorium:  grossly intact   Cognition:  grossly intact   Insight:  intact   Judgment:  behavior is adequate to circumstances        Impression:  1. Depression - mild  2. Anxiety - moderate  3. Difficulty sleeping    Plan:  1. Continue Zoloft 200mg q HS  2. Increase Wellbutrin XL to 300mg q day  3. Continue Trazodone 25-50mg as needed for insomnia  4. FU in 6 weeks    Follow-up #1  01/30/2023  HPI: 66yo WFreferred by PCP to the [facility] for depression and anxiety  PMHx: HLD,  GERD, Osteoporosis, Anxiety.    On her initial visit, she was to continue the Zoloft 200mg q HS and Wellbutrin XL 150mg q day was added.  She did not make her FU appointment.    Today, She states that she feels that the Wellbutrin has been helpful.  She had a sleep study and now has a CPAP.   She states that she is having trouble getting used to the CPAP. She has had it for about 3 months.   When she turns, the CPAP will make a noise and she wakes up and cannot go back to sleep.   She takes two 10mg Melatonin at night.  Believes that she is getting about 6 hours of sleep a night: 3 hours in her bed with the CPA and then 3 hours in her recliner without the CPAP.     She feels tired during the day.   States that if she could get sleep, she would feel better.     She is taking the Zoloft 200mg q HS and the Wellbutrin XL 150mg q day.   States that she is still anxious because she gets short of breath while trying to walk. She fears that she will fall again.     Will add Trazodone 50mg q HS.     PHQ score:  01/30/2023: 8? - mild to moderate  07/27/2022: 16 - moderately severe    NELY-7:  01/30/2023: 6  mild  07/27/2022: 14 - moderate anxiety    Mental Status Evaluation:  Appearance:  casually dressed, neatly groomed, obese, seated in wheelchair   Behavior:  cooperative; restless   Speech:  no latency; no press   Mood:  anxious   Affect:  guarded, mood-congruent   Thought Process:  normal and logical   Thought Content:  normal, no suicidality, no homicidality, delusions, or paranoia   Sensorium:  grossly intact   Cognition:  grossly intact   Insight:  intact   Judgment:  behavior is adequate to circumstances     Impression:  1. Depression - mod severe  2. Anxiety - moderate  3. Difficulty sleeping  4. May need PT    Plan:  1. Continue Zoloft 200mg q HS  2. Continue Wellbutrin XL 150mg q day  3. Start Trazodone 25-50mg as needed for insomnia  4. FU in 6 weeks      Initial Interview 07/27/2022  HPI: 66yo WFreferred by PCP to  the [facility] for depression and anxiety  PMHx: HLD, GERD, Osteoporosis, Anxiety.    Patient presents with her .   Patient explains that although she is taking Zoloft 200mg q HS, she is still anxious and has low energy. She gets discouraged because she cannot do the things that she used to do.   States that she used to be able to do housework but now she does not have the energy. She stopped doing housework 3 months ago.   States that she has been depressed all of her life; since around 8 or 9.   Her mother and father . Her father was an alcoholic. They would argue and she would get nervous. She is still nervous. Worries about everything and every body.     States that she stopped doing things 3 months ago because she has a hard time standing up. Her back gets weak.   She fell and broke her arm about a month ago and had surgery two weeks ago.   One month before breaking her arm, she broke her foot while climbing into the truck. She could not get her leg up high enough to step onto the step to get into the truck. The step broke.    In 2012, she fell and had to have shoulder replacement.     States that she has been taking Zoloft for about 10 years and it has worked well for her in the past.     Has not had a sleep study.   States that she does snore at night. Naps off and on during the day.   Does not feel that she could keep a mask on.   Requested PCP consider a sleep study.     Patient admits that she sits in the house a lot because she fears that she is going to fall. Both times that she fell, she reports that she had difficulty lifting her R leg. She fears that she is going to fall again.   Explained that the more she sits, the weaker she will become.  Strongly encouraged that she requests to go to PT to gain strength and confidence.     Will continue the Zoloft 200mg q HS.  Add Wellbutrin XL 150mg q day   FU in 3 weeks      Psychiatric History:   Reports a history of: depression and  anxiety  History of mental health out-patient treatment:  History of in-patient psychiatric hospitalization: denies  History of suicidal ideations: denies  History of suicidal threats:  History of suicide attempts: denies  History of self mutilation: denies    History of psychotropic medications:   Strattera 40mg q day  Zoloft   Wellbutrin    Family Psychiatric History:  Mental Illness: denies  Alcohol abuse/addiction: father  Drug addiction:    Substance Use History:  Hx of addiction or abuse: denies  Alcohol: denies  Amphetamines: denies  Benzodiazepines: denies  Cocaine: denies  Opiates: recent Rx for Noco after breaking her arm  Marijuana: denies  Tobacco: denies  Caffeine:    Social History:  Grew up in: [place]  Raised by: mother  Number of siblings: one sister and two brothers  Education: 9th grade  Sexual identity: heterosexual  Marital status:  x 51 years  Number of children: 4 adult sons  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    PHQ score:  07/27/2022: 16 - moderately severe  Over the last two weeks how often have you been bothered by little interest or pleasure in doing things Nearly every day   Over the last two weeks how often have you been bothered by feeling down, depressed or hopeless Nearly every day   Over the last two weeks how often have you been bothered by trouble falling or staying asleep, or sleeping too much Nearly every day     Over the last two weeks how often have you been bothered by feeling tired or having little energy Nearly every day   Over the last two weeks how often have you been bothered by a poor appetite  or overeating Not at all   Over the last two weeks how often have you been bothered by feeling bad about yourself - or that you are a failure or have let yourself or your family down Several days   Over the last two weeks how often have you been bothered by trouble concentrating on things, such as reading the newspaper or watching television Not at all   Over the last two weeks how often have you been bothered by moving or speaking so slowly that other people could have noticed. Or the opposite - being so fidgety or restless that you have been moving around a lot more than usual. Nearly every day   Over the last two weeks how often have you been bothered by thoughts that you would be better off dead, or of hurting yourself Not at all   If you checked off any problems, how difficult have these problems made it for you to do your work, take care of things at home or get along with other people? Very difficult   PHQ-9 Score 16   PHQ-9 Interpretation Moderately Severe     NELY-7:  07/27/2022: 14 - moderate anxiety  1. Feeling nervous, anxious, or on edge? Nearly everyday   2. Not being able to stop or control worrying? Nearly everyday   3. Worrying too much about different things? Nearly everyday   4. Trouble relaxing? Nearly everyday   5. Being so restless that it is hard to sit still? Not at all   6. Becoming easily annoyed or irritable? Several days   7. Feeling afraid as if something awful might happen? Several days   8. If you checked off any problems, how difficult have these problems made it for you to do your work, take care of things at home, or get along with other people? Very difficult   NELY-7 Score 14   Number answered (out of first 7) 7   Interpretation Moderate Anxiety     Mental Status Evaluation:  Appearance:  casually dressed, neatly groomed, obese, seated in wheelchair   Behavior:  cooperative; restless   Speech:  no latency; no press   Mood:  anxious   Affect:  guarded, mood-congruent   Thought  Process:  normal and logical   Thought Content:  normal, no suicidality, no homicidality, delusions, or paranoia   Sensorium:  grossly intact   Cognition:  grossly intact   Insight:  intact   Judgment:  behavior is adequate to circumstances     Impression:  1. Depression - mod severe  2. Anxiety - moderate  3. Difficulty sleeping  4. May need PT    Plan:  1. Zoloft 200mg q HS  2. Wellbutrin XL 150mg q day  3. Sleep study???  4. FU in 3 weeks

## 2025-04-14 ENCOUNTER — HOSPITAL ENCOUNTER (OUTPATIENT)
Dept: CARDIOLOGY | Facility: HOSPITAL | Age: 70
Discharge: HOME | End: 2025-04-14
Attending: NURSE PRACTITIONER
Payer: MEDICARE

## 2025-04-14 VITALS — BODY MASS INDEX: 55.32 KG/M2 | WEIGHT: 293 LBS | HEIGHT: 61 IN

## 2025-04-14 DIAGNOSIS — R06.09 DYSPNEA ON EXERTION: ICD-10-CM

## 2025-04-14 PROCEDURE — 93306 TTE W/DOPPLER COMPLETE: CPT

## 2025-04-15 ENCOUNTER — TELEPHONE (OUTPATIENT)
Dept: CARDIOLOGY | Facility: CLINIC | Age: 70
End: 2025-04-15
Payer: MEDICARE

## 2025-04-15 ENCOUNTER — RESULTS FOLLOW-UP (OUTPATIENT)
Dept: CARDIOLOGY | Facility: CLINIC | Age: 70
End: 2025-04-15

## 2025-04-15 LAB
APICAL FOUR CHAMBER EJECTION FRACTION: 57 %
APICAL TWO CHAMBER EJECTION FRACTION: 60 %
AV INDEX (PROSTH): 0.52
AV MEAN GRADIENT: 9 MMHG
AV PEAK GRADIENT: 14 MMHG
AV VALVE AREA BY VELOCITY RATIO: 1.7 CM²
AV VALVE AREA: 1.6 CM²
AV VELOCITY RATIO: 0.53
BSA FOR ECHO PROCEDURE: 2.62 M2
CV ECHO LV RWT: 0.59 CM
DOP CALC AO PEAK VEL: 1.9 M/S
DOP CALC AO VTI: 46.1 CM
DOP CALC LVOT AREA: 3.1 CM2
DOP CALC LVOT DIAMETER: 2 CM
DOP CALC LVOT PEAK VEL: 1 M/S
DOP CALC LVOT STROKE VOLUME: 75 CM3
DOP CALC MV VTI: 26.1 CM
DOP CALCLVOT PEAK VEL VTI: 23.9 CM
E WAVE DECELERATION TIME: 336 MSEC
E/A RATIO: 0.86
E/E' RATIO: 7 M/S
ECHO LV POSTERIOR WALL: 1.2 CM (ref 0.6–1.1)
FRACTIONAL SHORTENING: 31.7 % (ref 28–44)
HR MV ECHO: 72 BPM
INTERVENTRICULAR SEPTUM: 1.3 CM (ref 0.6–1.1)
IVC DIAMETER: 2.1 CM
LEFT ATRIUM AREA SYSTOLIC (APICAL 2 CHAMBER): 18.36 CM2
LEFT ATRIUM AREA SYSTOLIC (APICAL 4 CHAMBER): 23.23 CM2
LEFT ATRIUM SIZE: 4.2 CM
LEFT ATRIUM VOLUME INDEX MOD: 24 ML/M2
LEFT ATRIUM VOLUME MOD: 57 ML
LEFT INTERNAL DIMENSION IN SYSTOLE: 2.8 CM (ref 2.1–4)
LEFT VENTRICLE DIASTOLIC VOLUME INDEX: 31.54 ML/M2
LEFT VENTRICLE DIASTOLIC VOLUME: 76 ML
LEFT VENTRICLE END DIASTOLIC VOLUME APICAL 2 CHAMBER: 63.37 ML
LEFT VENTRICLE END DIASTOLIC VOLUME APICAL 4 CHAMBER: 56.66 ML
LEFT VENTRICLE END SYSTOLIC VOLUME APICAL 2 CHAMBER: 46.68 ML
LEFT VENTRICLE END SYSTOLIC VOLUME APICAL 4 CHAMBER: 61.85 ML
LEFT VENTRICLE MASS INDEX: 75.7 G/M2
LEFT VENTRICLE SYSTOLIC VOLUME INDEX: 12 ML/M2
LEFT VENTRICLE SYSTOLIC VOLUME: 29 ML
LEFT VENTRICULAR INTERNAL DIMENSION IN DIASTOLE: 4.1 CM (ref 3.5–6)
LEFT VENTRICULAR MASS: 182.5 G
LV LATERAL E/E' RATIO: 5.5 M/S
LV SEPTAL E/E' RATIO: 9.2 M/S
LVED V (TEICH): 76 ML
LVES V (TEICH): 29.4 ML
LVOT MG: 2.54 MMHG
LVOT MV: 0.76 CM/S
MV MEAN GRADIENT: 1 MMHG
MV PEAK A VEL: 0.64 M/S
MV PEAK E VEL: 0.55 M/S
MV PEAK GRADIENT: 3 MMHG
MV VALVE AREA BY CONTINUITY EQUATION: 2.88 CM2
OHS LV EJECTION FRACTION SIMPSONS BIPLANE MOD: 61 %
PISA TR MAX VEL: 2.6 M/S
RA MAJOR: 4.97 CM
RA PRESSURE ESTIMATED: 8 MMHG
RV TB RVSP: 11 MMHG
TDI LATERAL: 0.1 M/S
TDI SEPTAL: 0.06 M/S
TDI: 0.08 M/S
TR MAX PG: 27 MMHG
TRICUSPID ANNULAR PLANE SYSTOLIC EXCURSION: 1.92 CM
TV REST PULMONARY ARTERY PRESSURE: 35 MMHG
Z-SCORE OF LEFT VENTRICULAR DIMENSION IN END DIASTOLE: -10.03
Z-SCORE OF LEFT VENTRICULAR DIMENSION IN END SYSTOLE: -6.89

## 2025-04-17 ENCOUNTER — TELEPHONE (OUTPATIENT)
Dept: CARDIOLOGY | Facility: CLINIC | Age: 70
End: 2025-04-17
Payer: MEDICARE

## 2025-08-08 ENCOUNTER — HOSPITAL ENCOUNTER (EMERGENCY)
Facility: HOSPITAL | Age: 70
Discharge: HOME | End: 2025-08-08
Attending: EMERGENCY MEDICINE
Payer: MEDICARE

## 2025-08-08 VITALS
SYSTOLIC BLOOD PRESSURE: 123 MMHG | HEART RATE: 70 BPM | WEIGHT: 293 LBS | TEMPERATURE: 98 F | HEIGHT: 61 IN | BODY MASS INDEX: 55.32 KG/M2 | OXYGEN SATURATION: 94 % | RESPIRATION RATE: 18 BRPM | DIASTOLIC BLOOD PRESSURE: 72 MMHG

## 2025-08-08 DIAGNOSIS — M54.40 CHRONIC LEFT-SIDED LOW BACK PAIN WITH SCIATICA, SCIATICA LATERALITY UNSPECIFIED: ICD-10-CM

## 2025-08-08 DIAGNOSIS — G89.29 CHRONIC LEFT-SIDED LOW BACK PAIN WITH SCIATICA, SCIATICA LATERALITY UNSPECIFIED: ICD-10-CM

## 2025-08-08 DIAGNOSIS — R06.09 DYSPNEA ON EXERTION: Primary | ICD-10-CM

## 2025-08-08 PROCEDURE — 99284 EMERGENCY DEPT VISIT MOD MDM: CPT | Mod: 25

## 2025-08-08 PROCEDURE — 63600175 PHARM REV CODE 636 W HCPCS: Performed by: EMERGENCY MEDICINE

## 2025-08-08 PROCEDURE — 25000003 PHARM REV CODE 250: Performed by: EMERGENCY MEDICINE

## 2025-08-08 PROCEDURE — 96372 THER/PROPH/DIAG INJ SC/IM: CPT | Performed by: EMERGENCY MEDICINE

## 2025-08-08 RX ORDER — METHOCARBAMOL 500 MG/1
1000 TABLET, FILM COATED ORAL
Status: COMPLETED | OUTPATIENT
Start: 2025-08-08 | End: 2025-08-08

## 2025-08-08 RX ORDER — DEXAMETHASONE SODIUM PHOSPHATE 4 MG/ML
10 INJECTION, SOLUTION INTRA-ARTICULAR; INTRALESIONAL; INTRAMUSCULAR; INTRAVENOUS; SOFT TISSUE
Status: COMPLETED | OUTPATIENT
Start: 2025-08-08 | End: 2025-08-08

## 2025-08-08 RX ORDER — TRAMADOL HYDROCHLORIDE 50 MG/1
100 TABLET, FILM COATED ORAL
Status: COMPLETED | OUTPATIENT
Start: 2025-08-08 | End: 2025-08-08

## 2025-08-08 RX ORDER — TRAMADOL HYDROCHLORIDE 50 MG/1
50 TABLET, FILM COATED ORAL EVERY 6 HOURS PRN
Qty: 12 TABLET | Refills: 0 | Status: SHIPPED | OUTPATIENT
Start: 2025-08-08

## 2025-08-08 RX ORDER — METHYLPREDNISOLONE 4 MG/1
TABLET ORAL
Qty: 21 EACH | Refills: 0 | Status: SHIPPED | OUTPATIENT
Start: 2025-08-08 | End: 2025-08-29

## 2025-08-08 RX ORDER — ACETAMINOPHEN 500 MG
1000 TABLET ORAL
Status: COMPLETED | OUTPATIENT
Start: 2025-08-08 | End: 2025-08-08

## 2025-08-08 RX ADMIN — METHOCARBAMOL 1000 MG: 500 TABLET ORAL at 09:08

## 2025-08-08 RX ADMIN — ACETAMINOPHEN 1000 MG: 500 TABLET ORAL at 09:08

## 2025-08-08 RX ADMIN — DEXAMETHASONE SODIUM PHOSPHATE 10 MG: 4 INJECTION, SOLUTION INTRA-ARTICULAR; INTRALESIONAL; INTRAMUSCULAR; INTRAVENOUS; SOFT TISSUE at 09:08

## 2025-08-08 RX ADMIN — TRAMADOL HYDROCHLORIDE 100 MG: 50 TABLET, COATED ORAL at 09:08

## (undated) DEVICE — GLOVE PROTEXIS HYDROGEL SZ6.5

## (undated) DEVICE — SYR DISP LL 5CC

## (undated) DEVICE — NDL SPINAL 22GA 3.5 IN QUINCKE

## (undated) DEVICE — NDL SYR 10ML 18X1.5 LL BLUNT

## (undated) DEVICE — KIT SURGICAL TURNOVER

## (undated) DEVICE — SET SMARTSITE EXT SMALLBORE NF

## (undated) DEVICE — GLOVE PROTEXIS LTX MICRO  7.5

## (undated) DEVICE — BANDAID HOT COLORS

## (undated) DEVICE — BANDAGE SHEER STRIP 3/4X3IN

## (undated) DEVICE — Device

## (undated) DEVICE — TOURNIQUET SB QC DP 24X4IN

## (undated) DEVICE — NDL HYPO REG 25G X 1 1/2

## (undated) DEVICE — CLOSURE SKIN STERI STRIP 1/2X4

## (undated) DEVICE — GLOVE PROTEXIS PI CRM 6.5

## (undated) DEVICE — NDL QUINCKE S/SU 22GA 7IN

## (undated) DEVICE — NDL FLTR 5MCRN BLNT TIP 18GX1

## (undated) DEVICE — POSITIONER HEAD ADULT

## (undated) DEVICE — GOWN X-LG STERILE BACK

## (undated) DEVICE — PAD CAST 6X4YD SPECIALISTIC

## (undated) DEVICE — DRAPE MEDIUM SHEET 40X70IN

## (undated) DEVICE — GLOVE PROTEXIS BLUE LATEX 8

## (undated) DEVICE — NDL SAFETY 25G X 1.5 ECLIPSE

## (undated) DEVICE — DRAPE UTILITY W/ TAPE 20X30IN

## (undated) DEVICE — SYR 10CC LUER LOCK

## (undated) DEVICE — APPLICATOR CHLORAPREP ORN 26ML

## (undated) DEVICE — ADHESIVE DERMABOND ADVANCED

## (undated) DEVICE — SYR EPILOR LUER-LOK LOR 7ML

## (undated) DEVICE — DRESSING XEROFORM 5X9IN

## (undated) DEVICE — SOL NACL IRR 1000ML BTL

## (undated) DEVICE — SYR 3CC LUER LOC

## (undated) DEVICE — GLOVE PROTEXIS BLUE LATEX 7

## (undated) DEVICE — HANDLE DEVON RIGID OR LIGHT

## (undated) DEVICE — MANIFOLD 4 PORT

## (undated) DEVICE — BUCKET PLASTER DISPOSABLE

## (undated) DEVICE — SUT ETHILON 3-0 FS-1 30

## (undated) DEVICE — KIT BASIC ORTHO UNIVERSITY

## (undated) DEVICE — NDL QUINCKE S/SU 22GA 5IN

## (undated) DEVICE — GLOVE PROTEXIS HYDROGEL SZ7.5

## (undated) DEVICE — SPLINT PLASTER FAST SET 5X30IN

## (undated) DEVICE — DRAPE C-ARM COVER EZ 36X28IN

## (undated) DEVICE — SEE MEDLINE ITEM 157173